# Patient Record
Sex: FEMALE | Race: BLACK OR AFRICAN AMERICAN | Employment: OTHER | URBAN - METROPOLITAN AREA
[De-identification: names, ages, dates, MRNs, and addresses within clinical notes are randomized per-mention and may not be internally consistent; named-entity substitution may affect disease eponyms.]

---

## 2019-11-26 ENCOUNTER — APPOINTMENT (OUTPATIENT)
Dept: CT IMAGING | Age: 34
End: 2019-11-26
Attending: EMERGENCY MEDICINE
Payer: MEDICAID

## 2019-11-26 ENCOUNTER — HOSPITAL ENCOUNTER (EMERGENCY)
Age: 34
Discharge: HOME | End: 2019-11-26
Attending: EMERGENCY MEDICINE
Payer: MEDICAID

## 2019-11-26 VITALS
RESPIRATION RATE: 18 BRPM | HEART RATE: 68 BPM | DIASTOLIC BLOOD PRESSURE: 80 MMHG | SYSTOLIC BLOOD PRESSURE: 135 MMHG | HEIGHT: 64 IN | WEIGHT: 293 LBS | BODY MASS INDEX: 50.02 KG/M2 | TEMPERATURE: 98.2 F | OXYGEN SATURATION: 95 %

## 2019-11-26 DIAGNOSIS — K38.9 APPENDICOLITH: ICD-10-CM

## 2019-11-26 DIAGNOSIS — R31.9 HEMATURIA, UNSPECIFIED TYPE: ICD-10-CM

## 2019-11-26 DIAGNOSIS — R10.31 ABDOMINAL PAIN, RIGHT LOWER QUADRANT: Primary | ICD-10-CM

## 2019-11-26 LAB
ALBUMIN SERPL-MCNC: 3.4 G/DL (ref 3.5–5)
ALBUMIN/GLOB SERPL: 0.7 {RATIO} (ref 1.1–2.2)
ALP SERPL-CCNC: 86 U/L (ref 45–117)
ALT SERPL-CCNC: 31 U/L (ref 12–78)
AMORPH CRY URNS QL MICRO: ABNORMAL
ANION GAP SERPL CALC-SCNC: 11 MMOL/L (ref 5–15)
APPEARANCE UR: ABNORMAL
AST SERPL-CCNC: 18 U/L (ref 15–37)
BACTERIA URNS QL MICRO: NEGATIVE /HPF
BASOPHILS # BLD: 0 K/UL (ref 0–0.1)
BASOPHILS NFR BLD: 1 % (ref 0–1)
BILIRUB SERPL-MCNC: 0.3 MG/DL (ref 0.2–1)
BILIRUB UR QL: NEGATIVE
BUN SERPL-MCNC: 8 MG/DL (ref 6–20)
BUN/CREAT SERPL: 11 (ref 12–20)
CALCIUM SERPL-MCNC: 8.5 MG/DL (ref 8.5–10.1)
CHLORIDE SERPL-SCNC: 102 MMOL/L (ref 97–108)
CO2 SERPL-SCNC: 26 MMOL/L (ref 21–32)
COLOR UR: ABNORMAL
COMMENT, HOLDF: NORMAL
CREAT SERPL-MCNC: 0.75 MG/DL (ref 0.55–1.02)
DIFFERENTIAL METHOD BLD: NORMAL
EOSINOPHIL # BLD: 0.1 K/UL (ref 0–0.4)
EOSINOPHIL NFR BLD: 1 % (ref 0–7)
EPITH CASTS URNS QL MICRO: ABNORMAL /LPF
ERYTHROCYTE [DISTWIDTH] IN BLOOD BY AUTOMATED COUNT: 13.9 % (ref 11.5–14.5)
GLOBULIN SER CALC-MCNC: 4.7 G/DL (ref 2–4)
GLUCOSE SERPL-MCNC: 94 MG/DL (ref 65–100)
GLUCOSE UR STRIP.AUTO-MCNC: NEGATIVE MG/DL
HCG UR QL: NEGATIVE
HCT VFR BLD AUTO: 37.1 % (ref 35–47)
HGB BLD-MCNC: 12.2 G/DL (ref 11.5–16)
HGB UR QL STRIP: ABNORMAL
IMM GRANULOCYTES # BLD AUTO: 0 K/UL (ref 0–0.04)
IMM GRANULOCYTES NFR BLD AUTO: 0 % (ref 0–0.5)
KETONES UR QL STRIP.AUTO: NEGATIVE MG/DL
LEUKOCYTE ESTERASE UR QL STRIP.AUTO: ABNORMAL
LIPASE SERPL-CCNC: 105 U/L (ref 73–393)
LYMPHOCYTES # BLD: 2.3 K/UL (ref 0.8–3.5)
LYMPHOCYTES NFR BLD: 37 % (ref 12–49)
MCH RBC QN AUTO: 29 PG (ref 26–34)
MCHC RBC AUTO-ENTMCNC: 32.9 G/DL (ref 30–36.5)
MCV RBC AUTO: 88.1 FL (ref 80–99)
MONOCYTES # BLD: 0.4 K/UL (ref 0–1)
MONOCYTES NFR BLD: 6 % (ref 5–13)
MUCOUS THREADS URNS QL MICRO: ABNORMAL /LPF
NEUTS SEG # BLD: 3.4 K/UL (ref 1.8–8)
NEUTS SEG NFR BLD: 55 % (ref 32–75)
NITRITE UR QL STRIP.AUTO: NEGATIVE
NRBC # BLD: 0 K/UL (ref 0–0.01)
NRBC BLD-RTO: 0 PER 100 WBC
PH UR STRIP: 6 [PH] (ref 5–8)
PLATELET # BLD AUTO: 192 K/UL (ref 150–400)
PMV BLD AUTO: 10.9 FL (ref 8.9–12.9)
POTASSIUM SERPL-SCNC: 3.8 MMOL/L (ref 3.5–5.1)
PROT SERPL-MCNC: 8.1 G/DL (ref 6.4–8.2)
PROT UR STRIP-MCNC: NEGATIVE MG/DL
RBC # BLD AUTO: 4.21 M/UL (ref 3.8–5.2)
RBC #/AREA URNS HPF: ABNORMAL /HPF (ref 0–5)
SAMPLES BEING HELD,HOLD: NORMAL
SODIUM SERPL-SCNC: 139 MMOL/L (ref 136–145)
SP GR UR REFRACTOMETRY: 1.02 (ref 1–1.03)
UR CULT HOLD, URHOLD: NORMAL
UROBILINOGEN UR QL STRIP.AUTO: 0.2 EU/DL (ref 0.2–1)
WBC # BLD AUTO: 6.2 K/UL (ref 3.6–11)
WBC URNS QL MICRO: ABNORMAL /HPF (ref 0–4)

## 2019-11-26 PROCEDURE — 81001 URINALYSIS AUTO W/SCOPE: CPT

## 2019-11-26 PROCEDURE — 85025 COMPLETE CBC W/AUTO DIFF WBC: CPT

## 2019-11-26 PROCEDURE — 99284 EMERGENCY DEPT VISIT MOD MDM: CPT

## 2019-11-26 PROCEDURE — 74177 CT ABD & PELVIS W/CONTRAST: CPT

## 2019-11-26 PROCEDURE — 74011000258 HC RX REV CODE- 258: Performed by: EMERGENCY MEDICINE

## 2019-11-26 PROCEDURE — 74011636320 HC RX REV CODE- 636/320: Performed by: EMERGENCY MEDICINE

## 2019-11-26 PROCEDURE — 80053 COMPREHEN METABOLIC PANEL: CPT

## 2019-11-26 PROCEDURE — 83690 ASSAY OF LIPASE: CPT

## 2019-11-26 PROCEDURE — 81025 URINE PREGNANCY TEST: CPT

## 2019-11-26 PROCEDURE — 74011250636 HC RX REV CODE- 250/636: Performed by: EMERGENCY MEDICINE

## 2019-11-26 PROCEDURE — 96374 THER/PROPH/DIAG INJ IV PUSH: CPT

## 2019-11-26 PROCEDURE — 36415 COLL VENOUS BLD VENIPUNCTURE: CPT

## 2019-11-26 RX ORDER — HYDROXYZINE 50 MG/1
50 TABLET, FILM COATED ORAL AS NEEDED
COMMUNITY
End: 2020-12-07

## 2019-11-26 RX ORDER — SODIUM CHLORIDE 0.9 % (FLUSH) 0.9 %
10 SYRINGE (ML) INJECTION ONCE
Status: COMPLETED | OUTPATIENT
Start: 2019-11-26 | End: 2019-11-26

## 2019-11-26 RX ORDER — KETOROLAC TROMETHAMINE 30 MG/ML
15 INJECTION, SOLUTION INTRAMUSCULAR; INTRAVENOUS
Status: COMPLETED | OUTPATIENT
Start: 2019-11-26 | End: 2019-11-26

## 2019-11-26 RX ORDER — SODIUM CHLORIDE 9 MG/ML
50 INJECTION, SOLUTION INTRAVENOUS ONCE
Status: COMPLETED | OUTPATIENT
Start: 2019-11-26 | End: 2019-11-26

## 2019-11-26 RX ORDER — SERTRALINE HYDROCHLORIDE 100 MG/1
200 TABLET, FILM COATED ORAL DAILY
COMMUNITY

## 2019-11-26 RX ADMIN — KETOROLAC TROMETHAMINE 15 MG: 30 INJECTION, SOLUTION INTRAMUSCULAR at 07:45

## 2019-11-26 RX ADMIN — Medication 10 ML: at 08:13

## 2019-11-26 RX ADMIN — SODIUM CHLORIDE 50 ML: 900 INJECTION, SOLUTION INTRAVENOUS at 08:13

## 2019-11-26 RX ADMIN — IOPAMIDOL 100 ML: 755 INJECTION, SOLUTION INTRAVENOUS at 08:13

## 2019-11-26 NOTE — ED TRIAGE NOTES
Pt arrives ambulatory to ed with complaints of intermittent RLQ pain since Saturday. Denies N/V/D or urinary symptoms.

## 2019-11-26 NOTE — ED PROVIDER NOTES
The history is provided by the patient. No  was used. Abdominal Pain    This is a new problem. The current episode started more than 2 days ago. The problem occurs hourly. The problem has not changed since onset. The pain is located in the RLQ. The quality of the pain is aching and dull. The pain is at a severity of 7/10. The pain is moderate. Pertinent negatives include no anorexia, no fever, no belching, no diarrhea, no flatus, no hematochezia, no melena, no nausea, no vomiting, no constipation, no dysuria, no frequency, no hematuria, no headaches, no arthralgias, no myalgias, no trauma, no chest pain and no back pain. The pain is worsened by activity and certain positions. The pain is relieved by nothing. Past workup includes no CT scan, no ultrasound, no surgery, no esophagogastroduodenoscopy, no UGI, no colonoscopy and no barium enema. Past Medical History:   Diagnosis Date    Psychiatric disorder     anxiety    Psychiatric disorder     depression       Past Surgical History:   Procedure Laterality Date    HX ORTHOPAEDIC           History reviewed. No pertinent family history.     Social History     Socioeconomic History    Marital status: Not on file     Spouse name: Not on file    Number of children: Not on file    Years of education: Not on file    Highest education level: Not on file   Occupational History    Not on file   Social Needs    Financial resource strain: Not on file    Food insecurity:     Worry: Not on file     Inability: Not on file    Transportation needs:     Medical: Not on file     Non-medical: Not on file   Tobacco Use    Smoking status: Never Smoker    Smokeless tobacco: Never Used   Substance and Sexual Activity    Alcohol use: Never     Frequency: Never    Drug use: Never    Sexual activity: Not on file   Lifestyle    Physical activity:     Days per week: Not on file     Minutes per session: Not on file    Stress: Not on file   Relationships  Social connections:     Talks on phone: Not on file     Gets together: Not on file     Attends Yazdanism service: Not on file     Active member of club or organization: Not on file     Attends meetings of clubs or organizations: Not on file     Relationship status: Not on file    Intimate partner violence:     Fear of current or ex partner: Not on file     Emotionally abused: Not on file     Physically abused: Not on file     Forced sexual activity: Not on file   Other Topics Concern    Not on file   Social History Narrative    Not on file         ALLERGIES: Patient has no known allergies. Review of Systems   Constitutional: Negative for activity change, chills and fever. HENT: Negative for nosebleeds, sore throat, trouble swallowing and voice change. Eyes: Negative for visual disturbance. Respiratory: Negative for shortness of breath. Cardiovascular: Negative for chest pain and palpitations. Gastrointestinal: Positive for abdominal pain. Negative for anorexia, constipation, diarrhea, flatus, hematochezia, melena, nausea and vomiting. Genitourinary: Negative for difficulty urinating, dysuria, frequency, hematuria and urgency. Musculoskeletal: Negative for arthralgias, back pain, myalgias, neck pain and neck stiffness. Skin: Negative for color change. Allergic/Immunologic: Negative for immunocompromised state. Neurological: Negative for dizziness, seizures, syncope, weakness, light-headedness, numbness and headaches. Psychiatric/Behavioral: Negative for behavioral problems, confusion, hallucinations, self-injury and suicidal ideas. Vitals:    11/26/19 0729   BP: (!) 150/92   Pulse: 92   Resp: 18   Temp: 98 °F (36.7 °C)   SpO2: 99%   Weight: 135.5 kg (298 lb 11.6 oz)   Height: 5' 4\" (1.626 m)            Physical Exam  Vitals signs and nursing note reviewed. Constitutional:       General: She is not in acute distress. Appearance: She is well-developed.  She is not diaphoretic. HENT:      Head: Normocephalic and atraumatic. Eyes:      Pupils: Pupils are equal, round, and reactive to light. Neck:      Musculoskeletal: Normal range of motion and neck supple. Cardiovascular:      Rate and Rhythm: Normal rate and regular rhythm. Heart sounds: Normal heart sounds. No murmur. No friction rub. No gallop. Pulmonary:      Effort: Pulmonary effort is normal. No respiratory distress. Breath sounds: Normal breath sounds. No wheezing. Abdominal:      General: Bowel sounds are normal. There is no distension. Palpations: Abdomen is soft. Tenderness: There is no tenderness. There is no guarding or rebound. Musculoskeletal: Normal range of motion. Skin:     General: Skin is warm. Findings: No rash. Neurological:      Mental Status: She is alert and oriented to person, place, and time. Psychiatric:         Behavior: Behavior normal.         Thought Content: Thought content normal.         Judgment: Judgment normal.          MDM     This is a 52-year-old female with past medical history, review of systems, physical exam as above, presenting with complaints of 3 days of right lower quadrant abdominal pain. Patient states pain is worse when walking, bending over, denies nausea, vomiting, diarrhea, states she took some Tums for presumed \"gas\", without relief. She denies a history of similar pain, endorses 2 times , last menses approximately 2 weeks ago, endorses previous bilateral tubal ligation. Physical exam remarkable for obese well-appearing young female, in no acute distress, with soft nontender abdomen, with no elicitable pain, clear breath sounds, regular rate and rhythm without murmurs gallops or rubs. Differential includes constipation, diverticular disease, appendicitis. Plan to administer pain control, obtain CMP, CBC, lipase, UA, UPT, CT scan of the abdomen and pelvis.   We will reassess, and make a disposition. Procedures    9:04 AM  Patient remains in no acute distress, states the pain is improved. CT scan with appendicolith without appendicitis, hematuria, without UTI. Unclear source of abdominal pain, however without dangerous pathology and improved symptoms. Will discharge home with conservative therapy, primary care follow-up, return precautions given.

## 2019-11-26 NOTE — DISCHARGE INSTRUCTIONS
Patient Education        Abdominal Pain: Care Instructions  Your Care Instructions    Abdominal pain has many possible causes. Some aren't serious and get better on their own in a few days. Others need more testing and treatment. If your pain continues or gets worse, you need to be rechecked and may need more tests to find out what is wrong. You may need surgery to correct the problem. Don't ignore new symptoms, such as fever, nausea and vomiting, urination problems, pain that gets worse, and dizziness. These may be signs of a more serious problem. Your doctor may have recommended a follow-up visit in the next 8 to 12 hours. If you are not getting better, you may need more tests or treatment. The doctor has checked you carefully, but problems can develop later. If you notice any problems or new symptoms, get medical treatment right away. Follow-up care is a key part of your treatment and safety. Be sure to make and go to all appointments, and call your doctor if you are having problems. It's also a good idea to know your test results and keep a list of the medicines you take. How can you care for yourself at home? · Rest until you feel better. · To prevent dehydration, drink plenty of fluids, enough so that your urine is light yellow or clear like water. Choose water and other caffeine-free clear liquids until you feel better. If you have kidney, heart, or liver disease and have to limit fluids, talk with your doctor before you increase the amount of fluids you drink. · If your stomach is upset, eat mild foods, such as rice, dry toast or crackers, bananas, and applesauce. Try eating several small meals instead of two or three large ones. · Wait until 48 hours after all symptoms have gone away before you have spicy foods, alcohol, and drinks that contain caffeine. · Do not eat foods that are high in fat. · Avoid anti-inflammatory medicines such as aspirin, ibuprofen (Advil, Motrin), and naproxen (Aleve). These can cause stomach upset. Talk to your doctor if you take daily aspirin for another health problem. When should you call for help? Call 911 anytime you think you may need emergency care. For example, call if:    · You passed out (lost consciousness).     · You pass maroon or very bloody stools.     · You vomit blood or what looks like coffee grounds.     · You have new, severe belly pain.    Call your doctor now or seek immediate medical care if:    · Your pain gets worse, especially if it becomes focused in one area of your belly.     · You have a new or higher fever.     · Your stools are black and look like tar, or they have streaks of blood.     · You have unexpected vaginal bleeding.     · You have symptoms of a urinary tract infection. These may include:  ? Pain when you urinate. ? Urinating more often than usual.  ? Blood in your urine.     · You are dizzy or lightheaded, or you feel like you may faint.    Watch closely for changes in your health, and be sure to contact your doctor if:    · You are not getting better after 1 day (24 hours). Where can you learn more? Go to http://harjinderTeracentwinston.info/. Enter K120 in the search box to learn more about \"Abdominal Pain: Care Instructions. \"  Current as of: June 26, 2019  Content Version: 12.2  © 6157-6909 [employer]. Care instructions adapted under license by [employer] (which disclaims liability or warranty for this information). If you have questions about a medical condition or this instruction, always ask your healthcare professional. [place] [address] any warranty or liability for your use of this information. Patient Education        Blood in the Urine: Care Instructions  Your Care Instructions    Blood in the urine, or hematuria, may make the urine look red, brown, or pink. There may be blood every time you urinate or just from time to time.  You cannot always see blood in the urine, but it will show up in a urine test.  Blood in the urine may be serious. It should always be checked by a doctor. Your doctor may recommend more tests, including an X-ray, a CT scan, or a cystoscopy (which lets a doctor look inside the urethra and bladder). Blood in the urine can be a sign of another problem. Common causes are bladder infections and kidney stones. An injury to your groin or your genital area can also cause bleeding in the urinary tract. Very hard exercise--such as running a marathon--can cause blood in the urine. Blood in the urine can also be a sign of kidney disease or cancer in the bladder or kidney. Many cases of blood in the urine are caused by a harmless condition that runs in families. This is called benign familial hematuria. It does not need any treatment. Sometimes your urine may look red or brown even though it does not contain blood. For example, not getting enough fluids (dehydration), taking certain medicines, or having a liver problem can change the color of your urine. Eating foods such as beets, rhubarb, or blackberries or foods with red food coloring can make your urine look red or pink. Follow-up care is a key part of your treatment and safety. Be sure to make and go to all appointments, and call your doctor if you are having problems. It's also a good idea to know your test results and keep a list of the medicines you take. When should you call for help? Call your doctor now or seek immediate medical care if:    · You have symptoms of a urinary infection. For example:  ? You have pus in your urine. ? You have pain in your back just below your rib cage. This is called flank pain. ? You have a fever, chills, or body aches. ? It hurts to urinate. ?  You have groin or belly pain.     · You have more blood in your urine.    Watch closely for changes in your health, and be sure to contact your doctor if:    · You have new urination problems.     · You do not get better as expected. Where can you learn more? Go to http://harjinder-winston.info/. Enter F667 in the search box to learn more about \"Blood in the Urine: Care Instructions. \"  Current as of: December 19, 2018  Content Version: 12.2  © 7899-9309 [employer]. Care instructions adapted under license by [employer] (which disclaims liability or warranty for this information). If you have questions about a medical condition or this instruction, always ask your healthcare professional. [address] any warranty or liability for your use of this information.

## 2020-07-31 ENCOUNTER — HOSPITAL ENCOUNTER (EMERGENCY)
Age: 35
Discharge: HOME | End: 2020-07-31
Attending: EMERGENCY MEDICINE
Payer: MEDICAID

## 2020-07-31 ENCOUNTER — APPOINTMENT (OUTPATIENT)
Dept: GENERAL RADIOLOGY | Age: 35
End: 2020-07-31
Attending: EMERGENCY MEDICINE
Payer: MEDICAID

## 2020-07-31 VITALS
HEART RATE: 92 BPM | HEIGHT: 64 IN | RESPIRATION RATE: 16 BRPM | OXYGEN SATURATION: 95 % | DIASTOLIC BLOOD PRESSURE: 93 MMHG | WEIGHT: 293 LBS | SYSTOLIC BLOOD PRESSURE: 140 MMHG | TEMPERATURE: 98.5 F | BODY MASS INDEX: 50.02 KG/M2

## 2020-07-31 DIAGNOSIS — R07.89 OTHER CHEST PAIN: Primary | ICD-10-CM

## 2020-07-31 DIAGNOSIS — F41.1 ANXIETY STATE: ICD-10-CM

## 2020-07-31 LAB
ALBUMIN SERPL-MCNC: 3.6 G/DL (ref 3.5–5)
ALBUMIN/GLOB SERPL: 0.8 {RATIO} (ref 1.1–2.2)
ALP SERPL-CCNC: 74 U/L (ref 45–117)
ALT SERPL-CCNC: 28 U/L (ref 12–78)
ANION GAP SERPL CALC-SCNC: 10 MMOL/L (ref 5–15)
AST SERPL-CCNC: 19 U/L (ref 15–37)
ATRIAL RATE: 88 BPM
BASOPHILS # BLD: 0 K/UL (ref 0–0.1)
BASOPHILS NFR BLD: 1 % (ref 0–1)
BILIRUB SERPL-MCNC: 0.3 MG/DL (ref 0.2–1)
BUN SERPL-MCNC: 12 MG/DL (ref 6–20)
BUN/CREAT SERPL: 13 (ref 12–20)
CALCIUM SERPL-MCNC: 8.9 MG/DL (ref 8.5–10.1)
CALCULATED P AXIS, ECG09: 39 DEGREES
CALCULATED R AXIS, ECG10: 24 DEGREES
CALCULATED T AXIS, ECG11: 26 DEGREES
CHLORIDE SERPL-SCNC: 102 MMOL/L (ref 97–108)
CO2 SERPL-SCNC: 27 MMOL/L (ref 21–32)
COMMENT, HOLDF: NORMAL
CREAT SERPL-MCNC: 0.9 MG/DL (ref 0.55–1.02)
DIAGNOSIS, 93000: NORMAL
DIFFERENTIAL METHOD BLD: NORMAL
EOSINOPHIL # BLD: 0.1 K/UL (ref 0–0.4)
EOSINOPHIL NFR BLD: 1 % (ref 0–7)
ERYTHROCYTE [DISTWIDTH] IN BLOOD BY AUTOMATED COUNT: 13.7 % (ref 11.5–14.5)
GLOBULIN SER CALC-MCNC: 4.8 G/DL (ref 2–4)
GLUCOSE SERPL-MCNC: 92 MG/DL (ref 65–100)
HCG SERPL QL: NEGATIVE
HCG UR QL: NEGATIVE
HCT VFR BLD AUTO: 38.2 % (ref 35–47)
HGB BLD-MCNC: 12.5 G/DL (ref 11.5–16)
IMM GRANULOCYTES # BLD AUTO: 0 K/UL (ref 0–0.04)
IMM GRANULOCYTES NFR BLD AUTO: 0 % (ref 0–0.5)
LYMPHOCYTES # BLD: 2.8 K/UL (ref 0.8–3.5)
LYMPHOCYTES NFR BLD: 35 % (ref 12–49)
MCH RBC QN AUTO: 29.1 PG (ref 26–34)
MCHC RBC AUTO-ENTMCNC: 32.7 G/DL (ref 30–36.5)
MCV RBC AUTO: 88.8 FL (ref 80–99)
MONOCYTES # BLD: 0.5 K/UL (ref 0–1)
MONOCYTES NFR BLD: 6 % (ref 5–13)
NEUTS SEG # BLD: 4.5 K/UL (ref 1.8–8)
NEUTS SEG NFR BLD: 57 % (ref 32–75)
NRBC # BLD: 0 K/UL (ref 0–0.01)
NRBC BLD-RTO: 0 PER 100 WBC
P-R INTERVAL, ECG05: 164 MS
PLATELET # BLD AUTO: 214 K/UL (ref 150–400)
PMV BLD AUTO: 10.7 FL (ref 8.9–12.9)
POTASSIUM SERPL-SCNC: 3.5 MMOL/L (ref 3.5–5.1)
PROT SERPL-MCNC: 8.4 G/DL (ref 6.4–8.2)
Q-T INTERVAL, ECG07: 374 MS
QRS DURATION, ECG06: 84 MS
QTC CALCULATION (BEZET), ECG08: 452 MS
RBC # BLD AUTO: 4.3 M/UL (ref 3.8–5.2)
SAMPLES BEING HELD,HOLD: NORMAL
SODIUM SERPL-SCNC: 139 MMOL/L (ref 136–145)
TROPONIN I SERPL-MCNC: <0.05 NG/ML
VENTRICULAR RATE, ECG03: 88 BPM
WBC # BLD AUTO: 7.9 K/UL (ref 3.6–11)

## 2020-07-31 PROCEDURE — 71046 X-RAY EXAM CHEST 2 VIEWS: CPT

## 2020-07-31 PROCEDURE — 36415 COLL VENOUS BLD VENIPUNCTURE: CPT

## 2020-07-31 PROCEDURE — 81025 URINE PREGNANCY TEST: CPT

## 2020-07-31 PROCEDURE — 84703 CHORIONIC GONADOTROPIN ASSAY: CPT

## 2020-07-31 PROCEDURE — 85025 COMPLETE CBC W/AUTO DIFF WBC: CPT

## 2020-07-31 PROCEDURE — 84484 ASSAY OF TROPONIN QUANT: CPT

## 2020-07-31 PROCEDURE — 93005 ELECTROCARDIOGRAM TRACING: CPT

## 2020-07-31 PROCEDURE — 80053 COMPREHEN METABOLIC PANEL: CPT

## 2020-07-31 PROCEDURE — 99284 EMERGENCY DEPT VISIT MOD MDM: CPT

## 2020-07-31 RX ORDER — BUPIVACAINE HYDROCHLORIDE 5 MG/ML
3 INJECTION, SOLUTION EPIDURAL; INTRACAUDAL ONCE
Status: DISCONTINUED | OUTPATIENT
Start: 2020-07-31 | End: 2020-07-31

## 2020-07-31 NOTE — ED TRIAGE NOTES
Pt arrives ambulatory to ed with complaints of chest tightness and jitteriness s/p panic attack approx. 45 mins.

## 2020-07-31 NOTE — DISCHARGE INSTRUCTIONS
Patient Education     Patient Education        Anxiety Disorder: Care Instructions  Your Care Instructions     Anxiety is a normal reaction to stress. Difficult situations can cause you to have symptoms such as sweaty palms and a nervous feeling. In an anxiety disorder, the symptoms are far more severe. Constant worry, muscle tension, trouble sleeping, nausea and diarrhea, and other symptoms can make normal daily activities difficult or impossible. These symptoms may occur for no reason, and they can affect your work, school, or social life. Medicines, counseling, and self-care can all help. Follow-up care is a key part of your treatment and safety. Be sure to make and go to all appointments, and call your doctor if you are having problems. It's also a good idea to know your test results and keep a list of the medicines you take. How can you care for yourself at home? · Take medicines exactly as directed. Call your doctor if you think you are having a problem with your medicine. · Go to your counseling sessions and follow-up appointments. · Recognize and accept your anxiety. Then, when you are in a situation that makes you anxious, say to yourself, \"This is not an emergency. I feel uncomfortable, but I am not in danger. I can keep going even if I feel anxious. \"  · Be kind to your body:  ? Relieve tension with exercise or a massage. ? Get enough rest.  ? Avoid alcohol, caffeine, nicotine, and illegal drugs. They can increase your anxiety level and cause sleep problems. ? Learn and do relaxation techniques. See below for more about these techniques. · Engage your mind. Get out and do something you enjoy. Go to a funny movie, or take a walk or hike. Plan your day. Having too much or too little to do can make you anxious. · Keep a record of your symptoms. Discuss your fears with a good friend or family member, or join a support group for people with similar problems.  Talking to others sometimes relieves stress. · Get involved in social groups, or volunteer to help others. Being alone sometimes makes things seem worse than they are. · Get at least 30 minutes of exercise on most days of the week to relieve stress. Walking is a good choice. You also may want to do other activities, such as running, swimming, cycling, or playing tennis or team sports. Relaxation techniques  Do relaxation exercises 10 to 20 minutes a day. You can play soothing, relaxing music while you do them, if you wish. · Tell others in your house that you are going to do your relaxation exercises. Ask them not to disturb you. · Find a comfortable place, away from all distractions and noise. · Lie down on your back, or sit with your back straight. · Focus on your breathing. Make it slow and steady. · Breathe in through your nose. Breathe out through either your nose or mouth. · Breathe deeply, filling up the area between your navel and your rib cage. Breathe so that your belly goes up and down. · Do not hold your breath. · Breathe like this for 5 to 10 minutes. Notice the feeling of calmness throughout your whole body. As you continue to breathe slowly and deeply, relax by doing the following for another 5 to 10 minutes:  · Tighten and relax each muscle group in your body. You can begin at your toes and work your way up to your head. · Imagine your muscle groups relaxing and becoming heavy. · Empty your mind of all thoughts. · Let yourself relax more and more deeply. · Become aware of the state of calmness that surrounds you. · When your relaxation time is over, you can bring yourself back to alertness by moving your fingers and toes and then your hands and feet and then stretching and moving your entire body. Sometimes people fall asleep during relaxation, but they usually wake up shortly afterward.   · Always give yourself time to return to full alertness before you drive a car or do anything that might cause an accident if you are not fully alert. Never play a relaxation tape while you drive a car. When should you call for help? [id number] anytime you think you may need emergency care. For example, call if:  · You feel you cannot stop from hurting yourself or someone else. Keep the numbers for these national suicide hotlines: 3-574-842-TALK (5-584.245.4372) and 1-930-KXOOGZO (4-423.787.4121). If you or someone you know talks about suicide or feeling hopeless, get help right away. Watch closely for changes in your health, and be sure to contact your doctor if:  · You have anxiety or fear that affects your life. · You have symptoms of anxiety that are new or different from those you had before. Where can you learn more? Go to http://harjinderZero Motorcycleswinston.info/  Enter P754 in the search box to learn more about \"Anxiety Disorder: Care Instructions. \"  Current as of: January 31, 2020               Content Version: 12.5  © 0823-9551 [employer]. Care instructions adapted under license by [employer] (which disclaims liability or warranty for this information). If you have questions about a medical condition or this instruction, always ask your healthcare professional. [address] any warranty or liability for your use of this information. Chest Pain: Care Instructions  Your Care Instructions     There are many things that can cause chest pain. Some are not serious and will get better on their own in a few days. But some kinds of chest pain need more testing and treatment. Your doctor may have recommended a follow-up visit in the next 8 to 12 hours. If you are not getting better, you may need more tests or treatment. Even though your doctor has released you, you still need to watch for any problems. The doctor carefully checked you, but sometimes problems can develop later. If you have new symptoms or if your symptoms do not get better, get medical care right away.   If you have worse or different chest pain or pressure that lasts more than 5 minutes or you passed out (lost consciousness), [id number] or seek other emergency help right away. A medical visit is only one step in your treatment. Even if you feel better, you still need to do what your doctor recommends, such as going to all suggested follow-up appointments and taking medicines exactly as directed. This will help you recover and help prevent future problems. How can you care for yourself at home? · Rest until you feel better. · Take your medicine exactly as prescribed. Call your doctor if you think you are having a problem with your medicine. · Do not drive after taking a prescription pain medicine. When should you call for help? [id number]:   · You passed out (lost consciousness). · You have severe difficulty breathing. · You have symptoms of a heart attack. These may include:  ? Chest pain or pressure, or a strange feeling in your chest.  ? Sweating. ? Shortness of breath. ? Nausea or vomiting. ? Pain, pressure, or a strange feeling in your back, neck, jaw, or upper belly or in one or both shoulders or arms. ? Lightheadedness or sudden weakness. ? A fast or irregular heartbeat. After you call 911, the  may tell you to chew 1 adult-strength or 2 to 4 low-dose aspirin. Wait for an ambulance. Do not try to drive yourself. Call your doctor today if:   · You have any trouble breathing. · Your chest pain gets worse. · You are dizzy or lightheaded, or you feel like you may faint. · You are not getting better as expected. · You are having new or different chest pain. Where can you learn more? Go to http://www.jacome.com/  Enter A120 in the search box to learn more about \"Chest Pain: Care Instructions. \"  Current as of: June 26, 2019               Content Version: 12.5  © 0059-5107 [employer]wise[employer].    Care instructions adapted under license by [employer] (which disclaims liability or warranty for this information). If you have questions about a medical condition or this instruction, always ask your healthcare professional. [address] any warranty or liability for your use of this information.

## 2020-07-31 NOTE — ED PROVIDER NOTES
Please note that this dictation was completed with [email or web address], the computer voice recognition software.  Quite often unanticipated grammatical, syntax, homophones, and other interpretive errors are inadvertently transcribed by the computer software.  Please disregard these errors.  Please excuse any errors that have escaped final proofreading. 76-year-old female past medical history markable for anxiety, depression, and morbid obesity presents the ER complaining of \"been having chest pain today since 11 AM.  Patient states is a dull ache intermittent acute onset at rest has continued through the day. Patient states that approximately 1 hour ago she began to have a panic attack. Patient states she felt like she was having difficulty catching her breath and generalized anxiety. She said there was no inciting episodes who caused this to start, \"I was driving in the car with my  and I just started. \"  Patient states she took some Atarax approximately 45 minutes ago and feels like that is starting to help now. She denies current chest pain shortness of breath or other current systemic complaints. She denies recent illnesses nausea vomiting diarrhea vaginal discharge burning with urination vaginal bleeding. pt denies HA, vison changes, diff swallowing,  Abd pain, F/Ch, N/V, D/Cons or other current systemic complaints    Social/ PSH reviewed in EMR    EMR Chart Reviewed           Past Medical History:   Diagnosis Date    Psychiatric disorder     anxiety    Psychiatric disorder     depression       Past Surgical History:   Procedure Laterality Date    HX ORTHOPAEDIC           History reviewed. No pertinent family history.     Social History     Socioeconomic History    Marital status:      Spouse name: Not on file    Number of children: Not on file    Years of education: Not on file    Highest education level: Not on file   Occupational History    Not on file   Social Needs    Financial resource strain: Not on file    Food insecurity     Worry: Not on file     Inability: Not on file    Transportation needs     Medical: Not on file     Non-medical: Not on file   Tobacco Use    Smoking status: Never Smoker    Smokeless tobacco: Never Used   Substance and Sexual Activity    Alcohol use: Never     Frequency: Never    Drug use: Never    Sexual activity: Not on file   Lifestyle    Physical activity     Days per week: Not on file     Minutes per session: Not on file    Stress: Not on file   Relationships    Social connections     Talks on phone: Not on file     Gets together: Not on file     Attends Cheondoism service: Not on file     Active member of club or organization: Not on file     Attends meetings of clubs or organizations: Not on file     Relationship status: Not on file    Intimate partner violence     Fear of current or ex partner: Not on file     Emotionally abused: Not on file     Physically abused: Not on file     Forced sexual activity: Not on file   Other Topics Concern    Not on file   Social History Narrative    Not on file         ALLERGIES: Patient has no known allergies. Review of Systems   Constitutional: Negative for chills and fever. HENT: Negative for trouble swallowing and voice change. Respiratory: Positive for chest tightness and shortness of breath. Cardiovascular: Positive for chest pain. Psychiatric/Behavioral: The patient is nervous/anxious. All other systems reviewed and are negative. Vitals:    07/31/20 1724 07/31/20 1815   BP: (!) 194/95 (!) 140/93   Pulse: 92    Resp: 16    Temp: 98.5 °F (36.9 °C)    SpO2: 100% 95%   Weight: 140.5 kg (309 lb 11.9 oz)    Height: 5' 4\" (1.626 m)             Physical Exam  Vitals signs and nursing note reviewed. Constitutional:       General: She is not in acute distress. Appearance: Normal appearance. She is well-developed. She is not ill-appearing, toxic-appearing or diaphoretic.       Comments: NAD, AxOx4, speaking in complete sentences     HENT:      Head: Normocephalic and atraumatic. Comments: Cn intact    Noted R mandibular swelling/ consistent w/ abscess;      Right Ear: External ear normal.      Left Ear: External ear normal.   Eyes:      General: No scleral icterus. Right eye: No discharge. Extraocular Movements: Extraocular movements intact. Conjunctiva/sclera: Conjunctivae normal.      Pupils: Pupils are equal, round, and reactive to light. Neck:      Musculoskeletal: Normal range of motion and neck supple. Vascular: No JVD. Trachea: No tracheal deviation. Cardiovascular:      Rate and Rhythm: Normal rate and regular rhythm. Pulses: Normal pulses. Heart sounds: Normal heart sounds. No murmur. No friction rub. No gallop. Pulmonary:      Effort: Pulmonary effort is normal. No respiratory distress. Breath sounds: Normal breath sounds. No wheezing or rales. Chest:      Chest wall: No tenderness. Abdominal:      General: Bowel sounds are normal.      Palpations: Abdomen is soft. Tenderness: There is no abdominal tenderness. There is no guarding or rebound. Genitourinary:     Vagina: No vaginal discharge. Comments: Pt denies urinary/ vaginal complaints  Musculoskeletal: Normal range of motion. General: No swelling, tenderness, deformity or signs of injury. Right lower leg: No edema. Left lower leg: No edema. Skin:     General: Skin is warm and dry. Capillary Refill: Capillary refill takes less than 2 seconds. Coloration: Skin is not jaundiced or pale. Findings: No bruising, erythema, lesion or rash. Neurological:      General: No focal deficit present. Mental Status: She is alert and oriented to person, place, and time. Cranial Nerves: No cranial nerve deficit. Motor: No abnormal muscle tone.       Coordination: Coordination normal.      Comments: pt has motor/ CV/ Sensation grossly intact to all extremities, R = L in strength;         Psychiatric:         Behavior: Behavior normal.         Thought Content: Thought content normal.          MDM       Procedures      Chief Complaint   Patient presents with    Panic Attack       5:44 PM  The patients presenting problems have been discussed, and they are in agreement with the care plan formulated and outlined with them. I have encouraged them to ask questions as they arise throughout their visit. MEDICATIONS GIVEN:  Medications - No data to display    LABS REVIEWED:  Labs Reviewed   SAMPLES BEING HELD   CBC WITH AUTOMATED DIFF   METABOLIC PANEL, COMPREHENSIVE   TROPONIN I   HCG URINE, QL. - POC       RADIOLOGY RESULTS:  The following have been ordered and reviewed:  _____________________________________________________________________  _____________________________________________________________________    EKG interpretation:   Rhythm: normal sinus rhythm; and regular . Rate (approx.): 88; Axis: normal; P wave: normal; QRS interval: normal ; ST/T wave: normal; Negative acute significant segmental elevations/ no old;     PROCEDURES:        CONSULTATIONS:       PROGRESS NOTES:      DIAGNOSIS:    1. Other chest pain    2. Anxiety state        PLAN:  1-Chest Pain / neg ed evaluation - will have pt follow-up with Cardiology;   2 Panic attack - resolved w/ atarax;       ED COURSE: The patients hospital course has been uncomplicated. Kayley Plain  results have been reviewed with her. She has been counseled regarding her diagnosis. She verbally conveys understanding and agreement of the signs, symptoms, diagnosis, treatment and prognosis and additionally agrees to Call/ Arrange follow up as recommended with Dr. Pierre Hickman MD in 24 - 48 hours. She also agrees with the care-plan and conveys that all of her questions have been answered.   I have also put together some discharge instructions for her that include: 1) educational information regarding their diagnosis, 2) how to care for their diagnosis at home, as well a 3) list of reasons why they would want to return to the ED prior to their follow-up appointment, should their condition change or for concerns.

## 2020-08-03 ENCOUNTER — TELEPHONE (OUTPATIENT)
Dept: CARDIOLOGY CLINIC | Age: 35
End: 2020-08-03

## 2020-08-03 NOTE — TELEPHONE ENCOUNTER
Left a voice message on 8/3/2020 requesting a return call to coordinate an appointment with ( any available cardiologist) due to ER referral for chest pain & SOB(No arrhythmia noted).

## 2020-08-06 ENCOUNTER — OFFICE VISIT (OUTPATIENT)
Dept: CARDIOLOGY CLINIC | Age: 35
End: 2020-08-06
Payer: MEDICAID

## 2020-08-06 VITALS
SYSTOLIC BLOOD PRESSURE: 120 MMHG | RESPIRATION RATE: 18 BRPM | BODY MASS INDEX: 50.02 KG/M2 | HEART RATE: 82 BPM | OXYGEN SATURATION: 98 % | DIASTOLIC BLOOD PRESSURE: 80 MMHG | WEIGHT: 293 LBS | HEIGHT: 64 IN

## 2020-08-06 DIAGNOSIS — R00.2 PALPITATIONS: ICD-10-CM

## 2020-08-06 DIAGNOSIS — R06.02 SOB (SHORTNESS OF BREATH): Primary | ICD-10-CM

## 2020-08-06 DIAGNOSIS — F43.12 CHRONIC POST-TRAUMATIC STRESS DISORDER (PTSD): ICD-10-CM

## 2020-08-06 DIAGNOSIS — F41.0 PANIC ATTACKS: ICD-10-CM

## 2020-08-06 DIAGNOSIS — E66.01 OBESITY, MORBID (HCC): ICD-10-CM

## 2020-08-06 DIAGNOSIS — R06.02 SOB (SHORTNESS OF BREATH): ICD-10-CM

## 2020-08-06 PROCEDURE — 99204 OFFICE O/P NEW MOD 45 MIN: CPT | Performed by: SPECIALIST

## 2020-08-06 PROCEDURE — 1111F DSCHRG MED/CURRENT MED MERGE: CPT | Performed by: SPECIALIST

## 2020-08-06 NOTE — PROGRESS NOTES
HISTORY OF PRESENT ILLNESS  Megan Fenton is a 36 y.o. female. She is referred from [facility] [place] [address] emergency room June of shortness of breath and palpitations. She went there about a week ago with what she felt was a panic attack. Her EKG showed normal sinus rhythm. She does have a history of anxiety and panic attacks but is also had occasional bouts of rapid palpitations and shortness of breath. She does not smoke or drink she used to work as a  for 7 years and became so traumatized that she is now on long-term disability from this. She has no significant family history of heart disease. I reviewed her labs from the emergency room and they are unremarkable as was her chest x-ray. HPI  Patient Active Problem List   Diagnosis Code    Obesity, morbid ([facility] [address] E66.01    Panic attacks F41.0    Rapid palpitations R00.2    Chronic post-traumatic stress disorder (PTSD) F43.12    SOB (shortness of breath) R06.02     Current Outpatient Medications   Medication Sig Dispense Refill    levonorgestreL (MIRENA) 20 mcg/24 hours (5 yrs) 52 mg IUD 1 Intra Uterine Device by IntraUTERine route once.  sertraline (ZOLOFT) 100 mg tablet Take 200 mg by mouth daily.  hydrOXYzine HCl (ATARAX) 50 mg tablet Take 50 mg by mouth as needed for Anxiety. Past Medical History:   Diagnosis Date    Psychiatric disorder     anxiety    Psychiatric disorder     depression     Past Surgical History:   Procedure Laterality Date    HX ORTHOPAEDIC         Review of Systems   Respiratory: Positive for shortness of breath. Cardiovascular: Positive for palpitations. Psychiatric/Behavioral: The patient is nervous/anxious. All other systems reviewed and are negative.     Visit Vitals  /80 (BP 1 Location: Left arm, BP Patient Position: Sitting)   Pulse 82   Resp 18   Ht 5' 4\" (1.626 m)   Wt 312 lb (141.5 kg)   SpO2 98%   BMI 53.55 kg/m²       Physical Exam   Constitutional: She is oriented to person, place, and time. She appears well-nourished. HENT:   Head: Atraumatic. Eyes: Conjunctivae are normal.   Neck: Neck supple. Cardiovascular: Normal rate, regular rhythm and normal heart sounds. Exam reveals no gallop and no friction rub. No murmur heard. Pulmonary/Chest: Breath sounds normal. She has no wheezes. Abdominal: Bowel sounds are normal. There is no abdominal tenderness. Musculoskeletal:         General: No edema. Neurological: She is oriented to person, place, and time. Skin: Skin is dry. Psychiatric: Her behavior is normal.   Nursing note and vitals reviewed. ASSESSMENT and PLAN  Her exam is unremarkable and her EKG done recently was normal as well. I suspect that she is having panic attacks but she could also be having supra ventricular tachycardia and then the sense of anxiety. I believe it would be hard to catch any arrhythmia at present unless it becomes more frequent. However I will have her return for an echocardiogram and we will call her regarding the results. If her echo is unremarkable and I will see her back as needed.

## 2020-08-13 ENCOUNTER — ANCILLARY PROCEDURE (OUTPATIENT)
Dept: CARDIOLOGY CLINIC | Age: 35
End: 2020-08-13
Payer: MEDICAID

## 2020-08-13 VITALS — BODY MASS INDEX: 50.02 KG/M2 | HEIGHT: 64 IN | WEIGHT: 293 LBS

## 2020-08-13 PROCEDURE — 93306 TTE W/DOPPLER COMPLETE: CPT | Performed by: SPECIALIST

## 2020-08-21 ENCOUNTER — TELEPHONE (OUTPATIENT)
Dept: FAMILY MEDICINE CLINIC | Age: 35
End: 2020-08-21

## 2020-08-21 NOTE — TELEPHONE ENCOUNTER
----- Message from Ada Berry sent at 8/19/2020 12:15 PM EDT -----  Regarding: MIKE Joyce/ telephone   Appointment not available      Patient's first and last name: Hans Jenkins  ID numbers: [id number]    Caller's first and last name and relationship to patient (if not the patient): pt  Best contact number: [phone]  Preferred date and time: Any day Anytime  Scheduled appointment date and time:  n/a  Reason for appointment: New Patient  Details to clarify the request: Pt would like to establish care for PCP. Pt would like a call back to schedule as soon as possible. Pt is having issues with High blood pressure.

## 2020-08-24 ENCOUNTER — TELEPHONE (OUTPATIENT)
Dept: CARDIOLOGY CLINIC | Age: 35
End: 2020-08-24

## 2020-08-24 LAB
ECHO AO ROOT DIAM: 3.11 CM
ECHO AV AREA PEAK VELOCITY: 2.12 CM2
ECHO AV AREA VTI: 2.19 CM2
ECHO AV AREA/BSA PEAK VELOCITY: 0.9 CM2/M2
ECHO AV AREA/BSA VTI: 0.9 CM2/M2
ECHO AV MEAN GRADIENT: 5.13 MMHG
ECHO AV PEAK GRADIENT: 9.8 MMHG
ECHO AV PEAK VELOCITY: 156.54 CM/S
ECHO AV VTI: 26.27 CM
ECHO IVC PROX: 1.64 CM
ECHO LA AREA 4C: 19.41 CM2
ECHO LA MAJOR AXIS: 3.78 CM
ECHO LA MINOR AXIS: 1.6 CM
ECHO LA VOL 2C: 65.66 ML (ref 22–52)
ECHO LA VOL 4C: 52.55 ML (ref 22–52)
ECHO LA VOL BP: 63.09 ML (ref 22–52)
ECHO LA VOL/BSA BIPLANE: 26.7 ML/M2 (ref 16–28)
ECHO LA VOLUME INDEX A2C: 27.79 ML/M2 (ref 16–28)
ECHO LA VOLUME INDEX A4C: 22.24 ML/M2 (ref 16–28)
ECHO LV E' LATERAL VELOCITY: 8.95 CM/S
ECHO LV E' SEPTAL VELOCITY: 11.3 CM/S
ECHO LV EDV A2C: 99.8 ML
ECHO LV EDV A4C: 100.99 ML
ECHO LV EDV BP: 104.9 ML (ref 56–104)
ECHO LV EDV INDEX A4C: 42.7 ML/M2
ECHO LV EDV INDEX BP: 44.4 ML/M2
ECHO LV EDV NDEX A2C: 42.2 ML/M2
ECHO LV EJECTION FRACTION A2C: 56 PERCENT
ECHO LV EJECTION FRACTION A4C: 50 PERCENT
ECHO LV EJECTION FRACTION BIPLANE: 54.4 PERCENT (ref 55–100)
ECHO LV ESV A2C: 44.15 ML
ECHO LV ESV A4C: 50.27 ML
ECHO LV ESV BP: 47.86 ML (ref 19–49)
ECHO LV ESV INDEX A2C: 18.7 ML/M2
ECHO LV ESV INDEX A4C: 21.3 ML/M2
ECHO LV ESV INDEX BP: 20.3 ML/M2
ECHO LV INTERNAL DIMENSION DIASTOLIC: 5.46 CM (ref 3.9–5.3)
ECHO LV INTERNAL DIMENSION SYSTOLIC: 3.66 CM
ECHO LV IVSD: 1.14 CM (ref 0.6–0.9)
ECHO LV MASS 2D: 254 G (ref 67–162)
ECHO LV MASS INDEX 2D: 107.5 G/M2 (ref 43–95)
ECHO LV POSTERIOR WALL DIASTOLIC: 1.16 CM (ref 0.6–0.9)
ECHO LVOT DIAM: 2.15 CM
ECHO LVOT PEAK GRADIENT: 3.32 MMHG
ECHO LVOT PEAK VELOCITY: 91.13 CM/S
ECHO LVOT SV: 57.5 ML
ECHO LVOT VTI: 15.78 CM
ECHO MV A VELOCITY: 94.35 CM/S
ECHO MV E DECELERATION TIME (DT): 0.25 S
ECHO MV E VELOCITY: 81.77 CM/S
ECHO MV E/A RATIO: 0.87
ECHO MV E/E' LATERAL: 9.14
ECHO MV E/E' RATIO (AVERAGED): 8.19
ECHO MV E/E' SEPTAL: 7.24
ECHO PV MAX VELOCITY: 123.94 CM/S
ECHO PV PEAK INSTANTANEOUS GRADIENT SYSTOLIC: 6.14 MMHG
ECHO RV TAPSE: 2.67 CM (ref 1.5–2)
LA VOL DISK BP: 60.17 ML (ref 22–52)

## 2020-08-24 NOTE — TELEPHONE ENCOUNTER
----- Message from Tangela Self MD sent at 8/24/2020  8:32 AM EDT -----  Echo unremarkable, will see back as needed

## 2020-08-24 NOTE — TELEPHONE ENCOUNTER
Patient has been scheduled with Juanita Gary NP for new patient appointment 9/2020.   Letty Alvarado LPN

## 2020-09-09 ENCOUNTER — OFFICE VISIT (OUTPATIENT)
Dept: FAMILY MEDICINE CLINIC | Age: 35
End: 2020-09-09
Payer: MEDICAID

## 2020-09-09 VITALS
RESPIRATION RATE: 16 BRPM | OXYGEN SATURATION: 98 % | HEART RATE: 77 BPM | HEIGHT: 64 IN | DIASTOLIC BLOOD PRESSURE: 86 MMHG | TEMPERATURE: 98.5 F | WEIGHT: 293 LBS | BODY MASS INDEX: 50.02 KG/M2 | SYSTOLIC BLOOD PRESSURE: 140 MMHG

## 2020-09-09 DIAGNOSIS — I10 HYPERTENSION, UNSPECIFIED TYPE: Primary | ICD-10-CM

## 2020-09-09 DIAGNOSIS — F32.A ANXIETY AND DEPRESSION: ICD-10-CM

## 2020-09-09 DIAGNOSIS — E66.01 OBESITY, MORBID (HCC): ICD-10-CM

## 2020-09-09 DIAGNOSIS — F41.9 ANXIETY AND DEPRESSION: ICD-10-CM

## 2020-09-09 PROCEDURE — 99203 OFFICE O/P NEW LOW 30 MIN: CPT | Performed by: NURSE PRACTITIONER

## 2020-09-09 RX ORDER — PROPRANOLOL HYDROCHLORIDE 20 MG/1
TABLET ORAL
COMMUNITY
Start: 2020-08-25 | End: 2020-12-07

## 2020-09-09 RX ORDER — SERTRALINE HYDROCHLORIDE 100 MG/1
TABLET, FILM COATED ORAL
COMMUNITY
End: 2020-12-07

## 2020-09-09 NOTE — PROGRESS NOTES
[facility] Note  Subjective:      Kennedi Gold is a 30 y.o. female who presents as a new patient, she has histroy of hypertension, morbid obesity anxiety and depression. During her office visit with psychiatrist her blood pressure was elevated and thus her psychiatrist started he adán inderal 10 mg daily. Her current BP is 140/85. She is morbidly obese, will refer her for diet counseling with maryam Wyatt     Past Medical History:   Diagnosis Date    Psychiatric disorder     anxiety    Psychiatric disorder     depression   '  Past Surgical History:   Procedure Laterality Date    HX ORTHOPAEDIC      right knee        Current Outpatient Medications   Medication Sig Dispense Refill    propranoloL (INDERAL) 20 mg tablet TK 1 T PO BID FOR 14 DAYS      sertraline (Zoloft) 100 mg tablet 2 capsules      levonorgestreL (MIRENA) 20 mcg/24 hours (5 yrs) 52 mg IUD 1 Intra Uterine Device by IntraUTERine route once.  sertraline (ZOLOFT) 100 mg tablet Take 200 mg by mouth daily.  hydrOXYzine HCl (ATARAX) 50 mg tablet Take 50 mg by mouth as needed for Anxiety. No Known Allergies    ROS:   Complete review of systems was reviewed with pertinent information listed in HPI. Review of Systems   Constitutional: Negative. Respiratory: Negative. Cardiovascular: Negative. Gastrointestinal: Negative. Psychiatric/Behavioral: Positive for depression. Negative for hallucinations, memory loss, substance abuse and suicidal ideas. The patient has insomnia. The patient is not nervous/anxious. Objective:     Visit Vitals  /86 (BP 1 Location: Left arm, BP Patient Position: Sitting)   Pulse 77   Temp 98.5 °F (36.9 °C) (Oral)   Resp 16   Ht 5' 4\" (1.626 m)   Wt 315 lb (142.9 kg)   LMP 08/10/2020   SpO2 98%   BMI 54.07 kg/m²       Vitals and Nurse Documentation reviewed. Physical Exam  Constitutional:       Appearance: Normal appearance. She is obese.    HENT: Mouth/Throat:      Mouth: Mucous membranes are moist.   Neck:      Musculoskeletal: Normal range of motion and neck supple. Cardiovascular:      Rate and Rhythm: Normal rate and regular rhythm. Pulses: Normal pulses. Heart sounds: Normal heart sounds. No murmur. Pulmonary:      Effort: Pulmonary effort is normal.      Breath sounds: Normal breath sounds. Abdominal:      General: Bowel sounds are normal.      Palpations: Abdomen is soft. Neurological:      Mental Status: She is alert. Psychiatric:         Mood and Affect: Mood normal.         Thought Content: Thought content normal.         Assessment/Plan:     Diagnoses and all orders for this visit:    1. Hypertension, unspecified type  -     LIPID PANEL  -     METABOLIC PANEL, COMPREHENSIVE  -     CBC W/O DIFF    2. Anxiety and depression    3.  Obesity, morbid ([facility] [address]  -     REFERRAL TO DIETITIAN          Pt expressed understanding with the diagnosis and plan        Discussed expected course/resolution/complications of diagnosis in detail with patient.    Medication risks/benefits/costs/interactions/alternatives discussed with patient.    Pt was given an after visit summary which includes diagnoses, current medications & vitals.  Pt expressed understanding with the diagnosis and plan

## 2020-09-12 LAB
ALBUMIN SERPL-MCNC: 4.1 G/DL (ref 3.8–4.8)
ALBUMIN/GLOB SERPL: 1.3 {RATIO} (ref 1.2–2.2)
ALP SERPL-CCNC: 69 IU/L (ref 39–117)
ALT SERPL-CCNC: 17 IU/L (ref 0–32)
AST SERPL-CCNC: 15 IU/L (ref 0–40)
BILIRUB SERPL-MCNC: 0.2 MG/DL (ref 0–1.2)
BUN SERPL-MCNC: 9 MG/DL (ref 6–20)
BUN/CREAT SERPL: 13 (ref 9–23)
CALCIUM SERPL-MCNC: 8.9 MG/DL (ref 8.7–10.2)
CHLORIDE SERPL-SCNC: 106 MMOL/L (ref 96–106)
CHOLEST SERPL-MCNC: 209 MG/DL (ref 100–199)
CO2 SERPL-SCNC: 22 MMOL/L (ref 20–29)
CREAT SERPL-MCNC: 0.67 MG/DL (ref 0.57–1)
ERYTHROCYTE [DISTWIDTH] IN BLOOD BY AUTOMATED COUNT: 14 % (ref 11.7–15.4)
GLOBULIN SER CALC-MCNC: 3.2 G/DL (ref 1.5–4.5)
GLUCOSE SERPL-MCNC: 90 MG/DL (ref 65–99)
HCT VFR BLD AUTO: 36.7 % (ref 34–46.6)
HDLC SERPL-MCNC: 44 MG/DL
HGB BLD-MCNC: 12.4 G/DL (ref 11.1–15.9)
LDLC SERPL CALC-MCNC: 145 MG/DL (ref 0–99)
MCH RBC QN AUTO: 29.9 PG (ref 26.6–33)
MCHC RBC AUTO-ENTMCNC: 33.8 G/DL (ref 31.5–35.7)
MCV RBC AUTO: 88 FL (ref 79–97)
PLATELET # BLD AUTO: 200 X10E3/UL (ref 150–450)
POTASSIUM SERPL-SCNC: 4 MMOL/L (ref 3.5–5.2)
PROT SERPL-MCNC: 7.3 G/DL (ref 6–8.5)
RBC # BLD AUTO: 4.15 X10E6/UL (ref 3.77–5.28)
SODIUM SERPL-SCNC: 142 MMOL/L (ref 134–144)
TRIGL SERPL-MCNC: 112 MG/DL (ref 0–149)
VLDLC SERPL CALC-MCNC: 20 MG/DL (ref 5–40)
WBC # BLD AUTO: 6.4 X10E3/UL (ref 3.4–10.8)

## 2020-11-04 ENCOUNTER — OFFICE VISIT (OUTPATIENT)
Dept: FAMILY MEDICINE CLINIC | Age: 35
End: 2020-11-04
Payer: MEDICAID

## 2020-11-04 VITALS
RESPIRATION RATE: 18 BRPM | OXYGEN SATURATION: 97 % | HEIGHT: 64 IN | SYSTOLIC BLOOD PRESSURE: 131 MMHG | HEART RATE: 74 BPM | BODY MASS INDEX: 50.02 KG/M2 | DIASTOLIC BLOOD PRESSURE: 65 MMHG | TEMPERATURE: 98 F | WEIGHT: 293 LBS

## 2020-11-04 DIAGNOSIS — N30.90 CYSTITIS: Primary | ICD-10-CM

## 2020-11-04 LAB
BILIRUB UR QL STRIP: NEGATIVE
GLUCOSE UR-MCNC: NEGATIVE MG/DL
KETONES P FAST UR STRIP-MCNC: NEGATIVE MG/DL
PH UR STRIP: 7 [PH] (ref 4.6–8)
PROT UR QL STRIP: NORMAL
SP GR UR STRIP: 1.02 (ref 1–1.03)
UA UROBILINOGEN AMB POC: NORMAL (ref 0.2–1)
URINALYSIS CLARITY POC: NORMAL
URINALYSIS COLOR POC: NORMAL
URINE BLOOD POC: NORMAL
URINE LEUKOCYTES POC: NORMAL
URINE NITRITES POC: NEGATIVE

## 2020-11-04 PROCEDURE — 81003 URINALYSIS AUTO W/O SCOPE: CPT | Performed by: NURSE PRACTITIONER

## 2020-11-04 PROCEDURE — 99213 OFFICE O/P EST LOW 20 MIN: CPT | Performed by: NURSE PRACTITIONER

## 2020-11-04 RX ORDER — CIPROFLOXACIN 250 MG/1
250 TABLET, FILM COATED ORAL EVERY 12 HOURS
Qty: 14 TAB | Refills: 0 | Status: SHIPPED | OUTPATIENT
Start: 2020-11-04 | End: 2020-11-11

## 2020-11-04 RX ORDER — METOPROLOL SUCCINATE 25 MG/1
TABLET, EXTENDED RELEASE ORAL DAILY
COMMUNITY

## 2020-11-04 NOTE — PROGRESS NOTES
Chief Complaint   Patient presents with    Back Pain     right side radiating to the leg for 2 weeks       1. Have you been to the ER, urgent care clinic since your last visit? Hospitalized since your last visit? No    2. Have you seen or consulted any other health care providers outside of the [address] [place] [address] since your last visit? Include any pap smears or colon screening.  No    3 most recent PHQ Screens 11/4/2020   PHQ Not Done -   Little interest or pleasure in doing things -   Feeling down, depressed, irritable, or hopeless Not at all   Total Score PHQ 2 -      Health Maintenance Due   Topic Date Due    DTaP/Tdap/Td series (1 - Tdap) 08/28/2007    PAP AKA CERVICAL CYTOLOGY  08/28/2007

## 2020-11-04 NOTE — PROGRESS NOTES
[address] Note  Subjective:      Ralph Wagoner is a 30 y.o. female who presents for pain in right lower back, radiating to right thigh. She is also complaining of urinary , urgency, and frequency. She is sexually active and denies abnormal vaginal discharge. Past Medical History:   Diagnosis Date    Psychiatric disorder     anxiety    Psychiatric disorder     depression     Past Surgical History:   Procedure Laterality Date    HX ORTHOPAEDIC      right knee        Current Outpatient Medications   Medication Sig Dispense Refill    metoprolol succinate (TOPROL-XL) 25 mg XL tablet Take  by mouth daily.  ciprofloxacin HCl (CIPRO) 250 mg tablet Take 1 Tab by mouth every twelve (12) hours for 7 days. 14 Tab 0    levonorgestreL (MIRENA) 20 mcg/24 hours (5 yrs) 52 mg IUD 1 Intra Uterine Device by IntraUTERine route once.  sertraline (ZOLOFT) 100 mg tablet Take 200 mg by mouth daily.  hydrOXYzine HCl (ATARAX) 50 mg tablet Take 50 mg by mouth as needed for Anxiety.  propranoloL (INDERAL) 20 mg tablet TK 1 T PO BID FOR 14 DAYS      sertraline (Zoloft) 100 mg tablet 2 capsules       No Known Allergies    ROS:   Complete review of systems was reviewed with pertinent information listed in HPI. Review of Systems   Constitutional: Negative. Respiratory: Negative. Cardiovascular: Negative. Gastrointestinal: Negative. Genitourinary: Positive for dysuria, frequency and urgency. Musculoskeletal: Positive for back pain. Objective:     Visit Vitals  /65   Pulse 74   Temp 98 °F (36.7 °C) (Temporal)   Resp 18   Ht 5' 4\" (1.626 m)   Wt 316 lb 9.6 oz (143.6 kg)   SpO2 97%   BMI 54.34 kg/m²       Vitals and Nurse Documentation reviewed. Physical Exam  Constitutional:       Appearance: Normal appearance. She is obese. HENT:      Mouth/Throat:      Mouth: Mucous membranes are moist.   Neck:      Musculoskeletal: Normal range of motion and neck supple. Cardiovascular:      Rate and Rhythm: Normal rate and regular rhythm. Pulses: Normal pulses. Heart sounds: Normal heart sounds. Pulmonary:      Effort: Pulmonary effort is normal.      Breath sounds: Normal breath sounds. Abdominal:      General: Bowel sounds are normal.      Palpations: Abdomen is soft. Genitourinary:     Comments: UAis positive for infection  Neurological:      Mental Status: She is alert. Assessment/Plan:     Diagnoses and all orders for this visit:    1. Cystitis  -     ciprofloxacin HCl (CIPRO) 250 mg tablet;  Take 1 Tab by mouth every twelve (12) hours for 7 days.  -     AMB POC URINALYSIS DIP STICK AUTO W/O MICRO          Pt expressed understanding with the diagnosis and plan        Discussed expected course/resolution/complications of diagnosis in detail with patient.    Medication risks/benefits/costs/interactions/alternatives discussed with patient.    Pt was given an after visit summary which includes diagnoses, current medications & vitals.  Pt expressed understanding with the diagnosis and plan

## 2020-12-07 ENCOUNTER — HOSPITAL ENCOUNTER (EMERGENCY)
Age: 35
Discharge: HOME | End: 2020-12-07
Attending: EMERGENCY MEDICINE
Payer: MEDICAID

## 2020-12-07 ENCOUNTER — APPOINTMENT (OUTPATIENT)
Dept: ULTRASOUND IMAGING | Age: 35
End: 2020-12-07
Attending: EMERGENCY MEDICINE
Payer: MEDICAID

## 2020-12-07 VITALS
RESPIRATION RATE: 16 BRPM | DIASTOLIC BLOOD PRESSURE: 87 MMHG | WEIGHT: 293 LBS | TEMPERATURE: 96.7 F | HEIGHT: 64 IN | SYSTOLIC BLOOD PRESSURE: 132 MMHG | OXYGEN SATURATION: 99 % | BODY MASS INDEX: 50.02 KG/M2 | HEART RATE: 84 BPM

## 2020-12-07 DIAGNOSIS — M79.604 PAIN OF RIGHT LOWER EXTREMITY: Primary | ICD-10-CM

## 2020-12-07 PROCEDURE — 93971 EXTREMITY STUDY: CPT

## 2020-12-07 PROCEDURE — 99282 EMERGENCY DEPT VISIT SF MDM: CPT

## 2020-12-07 RX ORDER — DICLOFENAC SODIUM 75 MG/1
75 TABLET, DELAYED RELEASE ORAL 2 TIMES DAILY
Qty: 15 TAB | Refills: 0 | Status: SHIPPED | OUTPATIENT
Start: 2020-12-07 | End: 2021-02-14

## 2020-12-07 RX ORDER — PREDNISONE 20 MG/1
TABLET ORAL
Qty: 20 TAB | Refills: 0 | Status: SHIPPED | OUTPATIENT
Start: 2020-12-07 | End: 2020-12-07

## 2020-12-07 RX ORDER — PREDNISONE 20 MG/1
TABLET ORAL
Qty: 20 TAB | Refills: 0 | Status: SHIPPED | OUTPATIENT
Start: 2020-12-07 | End: 2021-02-14

## 2020-12-07 RX ORDER — HYDROXYZINE 50 MG/1
50 TABLET, FILM COATED ORAL
COMMUNITY
End: 2021-02-14 | Stop reason: ALTCHOICE

## 2020-12-07 RX ORDER — AMLODIPINE BESYLATE 5 MG/1
TABLET ORAL
COMMUNITY
Start: 2020-11-24

## 2020-12-07 NOTE — ED TRIAGE NOTES
Pt arrives ambulatory to ed with complaints of right leg pain x two weeks. Pt states pain started in thigh and now radiate down leg. Unable to walk when getting out of bed this am.     Denies any injury/trauma/travel/swelling. Tylenol PTA without relief.

## 2020-12-07 NOTE — ED PROVIDER NOTES
HPI the patient has a 2-week history of intermittent pain in her right thigh that radiates into her lower leg. The pain was worse today. She denies having back pain, leg swelling, chest pain, shortness of breath, fever or other complaints. Past Medical History:   Diagnosis Date    Hypertension     Psychiatric disorder     anxiety    Psychiatric disorder     depression       Past Surgical History:   Procedure Laterality Date    HX ORTHOPAEDIC      right knee          History reviewed. No pertinent family history. Social History     Socioeconomic History    Marital status:      Spouse name: Not on file    Number of children: Not on file    Years of education: Not on file    Highest education level: Not on file   Occupational History    Not on file   Social Needs    Financial resource strain: Not on file    Food insecurity     Worry: Not on file     Inability: Not on file    Transportation needs     Medical: Not on file     Non-medical: Not on file   Tobacco Use    Smoking status: Never Smoker    Smokeless tobacco: Never Used   Substance and Sexual Activity    Alcohol use: Never     Frequency: Never    Drug use: Never    Sexual activity: Yes   Lifestyle    Physical activity     Days per week: Not on file     Minutes per session: Not on file    Stress: Not on file   Relationships    Social connections     Talks on phone: Not on file     Gets together: Not on file     Attends Christianity service: Not on file     Active member of club or organization: Not on file     Attends meetings of clubs or organizations: Not on file     Relationship status: Not on file    Intimate partner violence     Fear of current or ex partner: Not on file     Emotionally abused: Not on file     Physically abused: Not on file     Forced sexual activity: Not on file   Other Topics Concern    Not on file   Social History Narrative    Not on file         ALLERGIES: Patient has no known allergies.     Review of Systems   Constitutional: Negative for fever. HENT: Negative for voice change. Eyes: Negative for pain. Respiratory: Negative for cough and shortness of breath. Cardiovascular: Negative for chest pain. Gastrointestinal: Negative for abdominal pain, nausea and vomiting. Genitourinary: Negative for flank pain. Musculoskeletal: Positive for myalgias. Negative for back pain. Skin: Negative for rash. Neurological: Negative for headaches. Psychiatric/Behavioral: Negative for confusion. Vitals:    12/07/20 1344   BP: 132/87   Pulse: 84   Resp: 16   Temp: (!) 96.7 °F (35.9 °C)   SpO2: 99%   Weight: 145.4 kg (320 lb 8.8 oz)   Height: 5' 4\" (1.626 m)            Physical Exam  Constitutional:       General: She is not in acute distress. Appearance: She is well-developed. HENT:      Head: Normocephalic. Neck:      Musculoskeletal: Normal range of motion. Cardiovascular:      Rate and Rhythm: Normal rate. Pulmonary:      Effort: Pulmonary effort is normal.   Musculoskeletal: Normal range of motion. Comments: The right lower extremity is nontender/nonswollen, with full range of motion at the hip knee and ankle. Distal pulses normal.   Skin:     General: Skin is warm and dry. Capillary Refill: Capillary refill takes less than 2 seconds. Neurological:      Mental Status: She is alert.    Psychiatric:         Behavior: Behavior normal.          MDM       Procedures

## 2020-12-07 NOTE — DISCHARGE INSTRUCTIONS
Patient Education        Leg Pain: Care Instructions  Your Care Instructions  Many things can cause leg pain. Too much exercise or overuse can cause a muscle cramp (or charley horse). You can get leg cramps from not eating a balanced diet that has enough potassium, calcium, and other minerals. If you do not drink enough fluids or are taking certain medicines, you may develop leg cramps. Other causes of leg pain include injuries, blood flow problems, nerve damage, and twisted and enlarged veins (varicose veins). You can usually ease pain with self-care. Your doctor may recommend that you rest your leg and keep it elevated. Follow-up care is a key part of your treatment and safety. Be sure to make and go to all appointments, and call your doctor if you are having problems. It's also a good idea to know your test results and keep a list of the medicines you take. How can you care for yourself at home? · Take pain medicines exactly as directed. ? If the doctor gave you a prescription medicine for pain, take it as prescribed. ? If you are not taking a prescription pain medicine, ask your doctor if you can take an over-the-counter medicine. · Take any other medicines exactly as prescribed. Call your doctor if you think you are having a problem with your medicine. · Rest your leg while you have pain, and avoid standing for long periods of time. · Prop up your leg at or above the level of your heart when possible. · Make sure you are eating a balanced diet that is rich in calcium, potassium, and magnesium, especially if you are pregnant. · If directed by your doctor, put ice or a cold pack on the area for 10 to 20 minutes at a time. Put a thin cloth between the ice and your skin. · Your leg may be in a splint, a brace, or an elastic bandage, and you may have crutches to help you walk. Follow your doctor's directions about how long to wear supports and how to use the crutches. When should you call for help? Call 911 anytime you think you may need emergency care. For example, call if:    · You have sudden chest pain and shortness of breath, or you cough up blood.     · Your leg is cool or pale or changes color. Call your doctor now or seek immediate medical care if:    · You have increasing or severe pain.     · Your leg suddenly feels weak and you cannot move it.     · You have signs of a blood clot, such as:  ? Pain in your calf, back of the knee, thigh, or groin. ? Redness and swelling in your leg or groin.     · You have signs of infection, such as:  ? Increased pain, swelling, warmth, or redness. ? Red streaks leading from the sore area. ? Pus draining from a place on your leg. ? A fever.     · You cannot bear weight on your leg. Watch closely for changes in your health, and be sure to contact your doctor if:    · You do not get better as expected. Where can you learn more? Go to http://www.gray.com/  Enter W838 in the search box to learn more about \"Leg Pain: Care Instructions. \"  Current as of: June 26, 2019               Content Version: 12.6  © 5855-8610 [employer]. Care instructions adapted under license by [employer] (which disclaims liability or warranty for this information). If you have questions about a medical condition or this instruction, always ask your healthcare professional. [place] [address] any warranty or liability for your use of this information.

## 2021-02-14 ENCOUNTER — HOSPITAL ENCOUNTER (EMERGENCY)
Age: 36
Discharge: HOME | End: 2021-02-14
Attending: EMERGENCY MEDICINE
Payer: MEDICAID

## 2021-02-14 VITALS
WEIGHT: 293 LBS | SYSTOLIC BLOOD PRESSURE: 132 MMHG | OXYGEN SATURATION: 99 % | DIASTOLIC BLOOD PRESSURE: 87 MMHG | HEART RATE: 93 BPM | BODY MASS INDEX: 56.2 KG/M2 | RESPIRATION RATE: 16 BRPM | TEMPERATURE: 98.3 F

## 2021-02-14 DIAGNOSIS — N39.0 URINARY TRACT INFECTION WITHOUT HEMATURIA, SITE UNSPECIFIED: Primary | ICD-10-CM

## 2021-02-14 LAB
APPEARANCE UR: ABNORMAL
BACTERIA URNS QL MICRO: NEGATIVE /HPF
BILIRUB UR QL: NEGATIVE
COLOR UR: YELLOW
EPITH CASTS URNS QL MICRO: ABNORMAL /LPF
GLUCOSE UR STRIP.AUTO-MCNC: NEGATIVE MG/DL
HCG UR QL: NEGATIVE
HGB UR QL STRIP: ABNORMAL
KETONES UR QL STRIP.AUTO: NEGATIVE MG/DL
LEUKOCYTE ESTERASE UR QL STRIP.AUTO: ABNORMAL
NITRITE UR QL STRIP.AUTO: NEGATIVE
PH UR STRIP: 6 [PH] (ref 5–8)
PROT UR STRIP-MCNC: NEGATIVE MG/DL
RBC #/AREA URNS HPF: ABNORMAL /HPF (ref 0–5)
SP GR UR REFRACTOMETRY: 1.02 (ref 1–1.03)
SP GR UR REFRACTOMETRY: 1.02 (ref 1–1.03)
UR CULT HOLD, URHOLD: NORMAL
UROBILINOGEN UR QL STRIP.AUTO: 0.2 EU/DL (ref 0.2–1)
WBC URNS QL MICRO: ABNORMAL /HPF (ref 0–4)

## 2021-02-14 PROCEDURE — 74011250637 HC RX REV CODE- 250/637: Performed by: EMERGENCY MEDICINE

## 2021-02-14 PROCEDURE — 87077 CULTURE AEROBIC IDENTIFY: CPT

## 2021-02-14 PROCEDURE — 87086 URINE CULTURE/COLONY COUNT: CPT

## 2021-02-14 PROCEDURE — 99284 EMERGENCY DEPT VISIT MOD MDM: CPT

## 2021-02-14 PROCEDURE — 87186 SC STD MICRODIL/AGAR DIL: CPT

## 2021-02-14 PROCEDURE — 81025 URINE PREGNANCY TEST: CPT

## 2021-02-14 PROCEDURE — 81001 URINALYSIS AUTO W/SCOPE: CPT

## 2021-02-14 RX ORDER — CIPROFLOXACIN 500 MG/1
500 TABLET ORAL 2 TIMES DAILY
Qty: 14 TAB | Refills: 0 | Status: SHIPPED | OUTPATIENT
Start: 2021-02-14 | End: 2021-02-21

## 2021-02-14 RX ADMIN — LEVOFLOXACIN 750 MG: 500 TABLET, FILM COATED ORAL at 16:01

## 2021-02-14 NOTE — Clinical Note
[facility] 
[facility] 
[address] [id number] 
[phone] 
 
Work/School Note 
 
Date: 2/14/2021 
 
To Whom It May concern: 
 
Micheline Silva was seen and treated today in the emergency room by the following provider(s): 
Attending Provider: Magnant, Charles D, MD.   
 
Micheline Silva is excused from work/school on 02/14/21 and 02/15/21.   
 
She is medically clear to return to work/school on 2/16/2021.  
 
 
Sincerely, 
 
 
 
 
Charles D Magnant, MD

## 2021-02-14 NOTE — ED NOTES

## 2021-02-14 NOTE — ED PROVIDER NOTES
HPI   The patient is a 30-year-old black female who presents to the emergency room with acute onset of right flank pain associated with burning and frequency with urination. She states she thinks is a UTI has urgency hesitancy and dysuria. She has some mild right flank pain associated with it. She denies fever chills nausea or vomiting or other systemic symptoms. Past Medical History:   Diagnosis Date    Hypertension     Psychiatric disorder     anxiety    Psychiatric disorder     depression       Past Surgical History:   Procedure Laterality Date    HX ORTHOPAEDIC      right knee          No family history on file.     Social History     Socioeconomic History    Marital status:      Spouse name: Not on file    Number of children: Not on file    Years of education: Not on file    Highest education level: Not on file   Occupational History    Not on file   Social Needs    Financial resource strain: Not on file    Food insecurity     Worry: Not on file     Inability: Not on file    Transportation needs     Medical: Not on file     Non-medical: Not on file   Tobacco Use    Smoking status: Never Smoker    Smokeless tobacco: Never Used   Substance and Sexual Activity    Alcohol use: Never     Frequency: Never    Drug use: Never    Sexual activity: Yes   Lifestyle    Physical activity     Days per week: Not on file     Minutes per session: Not on file    Stress: Not on file   Relationships    Social connections     Talks on phone: Not on file     Gets together: Not on file     Attends [employer] service: Not on file     Active member of club or organization: Not on file     Attends meetings of clubs or organizations: Not on file     Relationship status: Not on file    Intimate partner violence     Fear of current or ex partner: Not on file     Emotionally abused: Not on file     Physically abused: Not on file     Forced sexual activity: Not on file   Other Topics Concern    Not on file Social History Narrative    Not on file         ALLERGIES: Patient has no known allergies. Review of Systems   All other systems reviewed and are negative. Vitals:    02/14/21 1457   BP: (!) 155/94   Pulse: (!) 104   Resp: 16   Temp: 98.3 °F (36.8 °C)   SpO2: 99%   Weight: 148.5 kg (327 lb 6.1 oz)            Physical Exam  Vitals signs reviewed. Constitutional:       Appearance: She is normal weight. HENT:      Nose: Nose normal.      Mouth/Throat:      Mouth: Mucous membranes are moist.   Eyes:      Pupils: Pupils are equal, round, and reactive to light. Neck:      Musculoskeletal: Normal range of motion. Cardiovascular:      Rate and Rhythm: Bradycardia present. Pulmonary:      Effort: Pulmonary effort is normal.   Abdominal:      General: Abdomen is flat. Comments: Minimal suprapubic and right CVA angle tenderness   Skin:     General: Skin is warm. Capillary Refill: Capillary refill takes less than 2 seconds. Neurological:      General: No focal deficit present. Mental Status: She is alert. Mental status is at baseline. Psychiatric:         Mood and Affect: Mood normal.         Thought Content:  Thought content normal.         Judgment: Judgment normal.          MDM       Procedures

## 2021-02-14 NOTE — ED TRIAGE NOTES
Triage Note: Patient complains of right flank pain that radiates to mid/lower abdomen x 1 week. Patient reports dysuria and urinary frequency that started today.

## 2021-02-16 LAB
BACTERIA SPEC CULT: ABNORMAL
CC UR VC: ABNORMAL
SERVICE CMNT-IMP: ABNORMAL

## 2022-03-18 PROBLEM — F41.0 PANIC ATTACKS: Status: ACTIVE | Noted: 2020-08-06

## 2022-03-18 PROBLEM — R06.02 SOB (SHORTNESS OF BREATH): Status: ACTIVE | Noted: 2020-08-06

## 2022-03-18 PROBLEM — F43.12 CHRONIC POST-TRAUMATIC STRESS DISORDER (PTSD): Status: ACTIVE | Noted: 2020-08-06

## 2022-03-19 PROBLEM — R00.2 RAPID PALPITATIONS: Status: ACTIVE | Noted: 2020-08-06

## 2022-03-20 PROBLEM — E66.01 OBESITY, MORBID (HCC): Status: ACTIVE | Noted: 2020-08-06

## 2022-09-30 ENCOUNTER — OFFICE VISIT (OUTPATIENT)
Dept: FAMILY MEDICINE CLINIC | Age: 37
End: 2022-09-30
Payer: MEDICAID

## 2022-09-30 VITALS
OXYGEN SATURATION: 99 % | HEIGHT: 64 IN | BODY MASS INDEX: 50.02 KG/M2 | WEIGHT: 293 LBS | TEMPERATURE: 98.1 F | RESPIRATION RATE: 16 BRPM | HEART RATE: 96 BPM | SYSTOLIC BLOOD PRESSURE: 149 MMHG | DIASTOLIC BLOOD PRESSURE: 72 MMHG

## 2022-09-30 DIAGNOSIS — N64.4 BREAST PAIN: ICD-10-CM

## 2022-09-30 DIAGNOSIS — I10 HYPERTENSION, UNSPECIFIED TYPE: Primary | ICD-10-CM

## 2022-09-30 DIAGNOSIS — Z11.59 NEED FOR HEPATITIS C SCREENING TEST: ICD-10-CM

## 2022-09-30 PROCEDURE — 99213 OFFICE O/P EST LOW 20 MIN: CPT | Performed by: NURSE PRACTITIONER

## 2022-09-30 RX ORDER — ATORVASTATIN CALCIUM 20 MG/1
20 TABLET, FILM COATED ORAL DAILY
COMMUNITY
Start: 2022-09-21 | End: 2022-10-02

## 2022-09-30 RX ORDER — HYDROXYZINE 50 MG/1
TABLET, FILM COATED ORAL
COMMUNITY
Start: 2022-09-16

## 2022-09-30 NOTE — PROGRESS NOTES
[address] Note  Subjective:      Yuli Kelley is a 40 y.o. female who presents for C/O bilateral breast pain x 1 months, currently she has pain on her left breast. Denies breast lump of nipple discharge. She has history of hypertension, morbid obesity and depression, taking medications as prescribed, no medications side effects reported    Past Medical History:   Diagnosis Date    Depression     Hypertension     Psychiatric disorder     anxiety    Psychiatric disorder     depression     Past Surgical History:   Procedure Laterality Date    HX ORTHOPAEDIC      right knee      Current Outpatient Medications   Medication Sig Dispense Refill    atorvastatin (LIPITOR) 20 mg tablet Take 20 mg by mouth daily.  hydrOXYzine HCL (ATARAX) 50 mg tablet TAKE 1 TABLET BY MOUTH DAILY AS NEEDED FOR ANXIETY      amLODIPine (NORVASC) 5 mg tablet       metoprolol succinate (TOPROL-XL) 25 mg XL tablet Take  by mouth daily.  levonorgestreL (MIRENA) 20 mcg/24 hours (5 yrs) 52 mg IUD 1 Intra Uterine Device by IntraUTERine route once.  sertraline (ZOLOFT) 100 mg tablet Take 200 mg by mouth daily. No Known Allergies    ROS:   Complete review of systems was reviewed with pertinent information listed in HPI. Review of Systems   Constitutional: Negative. HENT: Negative. Respiratory: Negative. Cardiovascular: Negative. Gastrointestinal: Negative. Genitourinary: Negative. Musculoskeletal: Negative. Psychiatric/Behavioral:  Positive for depression. Objective:   Visit Vitals  BP (!) 149/72 (BP 1 Location: Right lower arm, BP Patient Position: Sitting, BP Cuff Size: Large adult)   Pulse 96   Temp 98.1 °F (36.7 °C) (Temporal)   Resp 16   Ht 5' 4\" (1.626 m)   Wt 339 lb 9.6 oz (154 kg)   SpO2 99%   BMI 58.29 kg/m²       Vitals and Nurse Documentation reviewed. Physical Exam  Constitutional:       Appearance: Normal appearance.    HENT:      Mouth/Throat: Mouth: Mucous membranes are moist.   Cardiovascular:      Rate and Rhythm: Normal rate and regular rhythm. Pulses: Normal pulses. Heart sounds: Normal heart sounds. No murmur heard. Pulmonary:      Effort: Pulmonary effort is normal.      Breath sounds: Normal breath sounds. Abdominal:      General: Bowel sounds are normal.      Palpations: Abdomen is soft. Musculoskeletal:      Cervical back: Normal range of motion and neck supple. Neurological:      Mental Status: She is alert. Psychiatric:         Mood and Affect: Mood normal.         Thought Content: Thought content normal.       Assessment/Plan:     Diagnoses and all orders for this visit:    1. Hypertension, unspecified type  -     LIPID PANEL; Future  -     METABOLIC PANEL, COMPREHENSIVE; Future  -     CBC W/O DIFF; Future    2. Need for hepatitis C screening test  -     HEPATITIS C AB; Future    3. Breast pain  -     US BREAST LT COMPLETE 4 QUAD; Future  -     US BREAST RT COMPLETE 4 QUAD; Future    Await labs and US      Pt expressed understanding with the diagnosis and plan        Discussed expected course/resolution/complications of diagnosis in detail with patient. Medication risks/benefits/costs/interactions/alternatives discussed with patient. Pt was given an after visit summary which includes diagnoses, current medications & vitals.   Pt expressed understanding with the diagnosis and plan

## 2022-09-30 NOTE — PROGRESS NOTES
Chief Complaint   Patient presents with    Breast pain     left         1. \"Have you been to the ER, urgent care clinic since your last visit? Hospitalized since your last visit? \" No    2. \"Have you seen or consulted any other health care providers outside of the [address] [place] [address] since your last visit? \" No     3. For patients over 45: Has the patient had a colonoscopy? NA - based on age     If the patient is female:    4. For patients over 36: Has the patient had a mammogram? NA - based on age    11. For patients over 21: Has the patient had a pap smear?  Yes - no Care Gap present     3 most recent PHQ Screens 9/30/2022   PHQ Not Done -   Little interest or pleasure in doing things Not at all   Feeling down, depressed, irritable, or hopeless Not at all   Total Score PHQ 2 0       Health Maintenance Due   Topic Date Due    Hepatitis C Screening  Never done    Depression Monitoring  Never done    DTaP/Tdap/Td series (1 - Tdap) Never done    COVID-19 Vaccine (3 - Booster for Pfizer series) 10/26/2021    Flu Vaccine (1) 08/01/2022

## 2022-10-01 LAB
ALBUMIN SERPL-MCNC: 4.2 G/DL (ref 3.8–4.8)
ALBUMIN/GLOB SERPL: 1.2 {RATIO} (ref 1.2–2.2)
ALP SERPL-CCNC: 89 IU/L (ref 44–121)
ALT SERPL-CCNC: 23 IU/L (ref 0–32)
AST SERPL-CCNC: 20 IU/L (ref 0–40)
BILIRUB SERPL-MCNC: 0.3 MG/DL (ref 0–1.2)
BUN SERPL-MCNC: 8 MG/DL (ref 6–20)
BUN/CREAT SERPL: 11 (ref 9–23)
CALCIUM SERPL-MCNC: 9.1 MG/DL (ref 8.7–10.2)
CHLORIDE SERPL-SCNC: 105 MMOL/L (ref 96–106)
CHOLEST SERPL-MCNC: 164 MG/DL (ref 100–199)
CO2 SERPL-SCNC: 24 MMOL/L (ref 20–29)
CREAT SERPL-MCNC: 0.7 MG/DL (ref 0.57–1)
EGFR: 115 ML/MIN/1.73
ERYTHROCYTE [DISTWIDTH] IN BLOOD BY AUTOMATED COUNT: 13.8 % (ref 11.7–15.4)
GLOBULIN SER CALC-MCNC: 3.6 G/DL (ref 1.5–4.5)
GLUCOSE SERPL-MCNC: 103 MG/DL (ref 70–99)
HCT VFR BLD AUTO: 40.1 % (ref 34–46.6)
HCV AB S/CO SERPL IA: <0.1 S/CO RATIO (ref 0–0.9)
HDLC SERPL-MCNC: 44 MG/DL
HGB BLD-MCNC: 12.9 G/DL (ref 11.1–15.9)
IMP & REVIEW OF LAB RESULTS: NORMAL
LDLC SERPL CALC-MCNC: 97 MG/DL (ref 0–99)
MCH RBC QN AUTO: 28.9 PG (ref 26.6–33)
MCHC RBC AUTO-ENTMCNC: 32.2 G/DL (ref 31.5–35.7)
MCV RBC AUTO: 90 FL (ref 79–97)
PLATELET # BLD AUTO: 246 X10E3/UL (ref 150–450)
POTASSIUM SERPL-SCNC: 4.3 MMOL/L (ref 3.5–5.2)
PROT SERPL-MCNC: 7.8 G/DL (ref 6–8.5)
RBC # BLD AUTO: 4.47 X10E6/UL (ref 3.77–5.28)
SODIUM SERPL-SCNC: 142 MMOL/L (ref 134–144)
TRIGL SERPL-MCNC: 128 MG/DL (ref 0–149)
VLDLC SERPL CALC-MCNC: 23 MG/DL (ref 5–40)
WBC # BLD AUTO: 7.1 X10E3/UL (ref 3.4–10.8)

## 2022-10-02 RX ORDER — ATORVASTATIN CALCIUM 10 MG/1
10 TABLET, FILM COATED ORAL DAILY
Qty: 90 TABLET | Refills: 0 | Status: SHIPPED | OUTPATIENT
Start: 2022-10-02

## 2022-10-11 ENCOUNTER — HOSPITAL ENCOUNTER (OUTPATIENT)
Dept: MAMMOGRAPHY | Age: 37
Discharge: HOME | End: 2022-10-11
Payer: MEDICAID

## 2022-10-11 DIAGNOSIS — N64.4 BREAST PAIN: ICD-10-CM

## 2022-10-11 DIAGNOSIS — Z12.31 VISIT FOR SCREENING MAMMOGRAM: ICD-10-CM

## 2022-10-11 DIAGNOSIS — R92.8 ABNORMAL MAMMOGRAM: ICD-10-CM

## 2022-10-11 PROCEDURE — 77063 BREAST TOMOSYNTHESIS BI: CPT

## 2022-10-11 PROCEDURE — 77065 DX MAMMO INCL CAD UNI: CPT

## 2022-10-11 NOTE — PROGRESS NOTES
Called Dr. Mason Gutierrez office and spoke with Skinny mendez. Advised that a Right breast biopsy recommendation has been made by the radiologist. Asked to please fax order to [phone].

## 2022-10-12 DIAGNOSIS — R89.7 ABNORMAL BREAST BIOPSY: Primary | ICD-10-CM

## 2022-10-12 RX ORDER — ALPRAZOLAM 0.5 MG/1
TABLET ORAL
Qty: 2 TABLET | Refills: 0 | Status: SHIPPED | OUTPATIENT
Start: 2022-10-12

## 2022-10-20 ENCOUNTER — HOSPITAL ENCOUNTER (OUTPATIENT)
Dept: MAMMOGRAPHY | Age: 37
Discharge: HOME | End: 2022-10-20
Payer: MEDICAID

## 2022-10-20 DIAGNOSIS — R92.8 ABNORMAL MAMMOGRAM: ICD-10-CM

## 2022-10-20 PROCEDURE — 77065 DX MAMMO INCL CAD UNI: CPT

## 2022-10-20 PROCEDURE — 74011000250 HC RX REV CODE- 250: Performed by: RADIOLOGY

## 2022-10-20 PROCEDURE — 19081 BX BREAST 1ST LESION STRTCTC: CPT

## 2022-10-20 PROCEDURE — 88305 TISSUE EXAM BY PATHOLOGIST: CPT

## 2022-10-20 PROCEDURE — C1819 TISSUE LOCALIZATION-EXCISION: HCPCS

## 2022-10-20 RX ORDER — LIDOCAINE HYDROCHLORIDE AND EPINEPHRINE 20; 10 MG/ML; UG/ML
10 INJECTION, SOLUTION INFILTRATION; PERINEURAL ONCE
Status: COMPLETED | OUTPATIENT
Start: 2022-10-20 | End: 2022-10-20

## 2022-10-20 RX ORDER — LIDOCAINE HYDROCHLORIDE 10 MG/ML
15 INJECTION INFILTRATION; PERINEURAL
Status: COMPLETED | OUTPATIENT
Start: 2022-10-20 | End: 2022-10-20

## 2022-10-20 RX ADMIN — LIDOCAINE HYDROCHLORIDE 15 ML: 10 INJECTION, SOLUTION INFILTRATION; PERINEURAL at 11:35

## 2022-10-20 RX ADMIN — LIDOCAINE HYDROCHLORIDE 15 ML: 10 INJECTION, SOLUTION INFILTRATION; PERINEURAL at 11:45

## 2022-10-20 RX ADMIN — LIDOCAINE HYDROCHLORIDE AND EPINEPHRINE 200 MG: 20; 10 INJECTION, SOLUTION INFILTRATION; PERINEURAL at 11:35

## 2022-10-20 RX ADMIN — LIDOCAINE HYDROCHLORIDE AND EPINEPHRINE 200 MG: 20; 10 INJECTION, SOLUTION INFILTRATION; PERINEURAL at 11:45

## 2022-10-20 NOTE — PROGRESS NOTES
Patient tolerated right breast biopsy x 2 sites well with scant bleeding. Biopsy sites were bandaged in the usual fashion and discharge instructions were reviewed with the patient. She was provided with a written copy as well. Advised patient that results should be available in 2-3 business days and that she will receive a phone call. Encouraged her to call with any questions or concerns.

## 2022-10-26 ENCOUNTER — TELEPHONE (OUTPATIENT)
Dept: FAMILY MEDICINE CLINIC | Age: 37
End: 2022-10-26

## 2022-10-26 NOTE — TELEPHONE ENCOUNTER
Outbound call to patient. Name and  verified. Let patient know that we have not got the results back of biopsy per provider and told patient we would reach out to her once we did have results.

## 2022-10-28 NOTE — PROGRESS NOTES
Pathology approved by Dr. Faye Szymanski. Called patient and relayed benign results. Advised patient that she should begin annual mammograms when she is 41. She reports that her biopsy site is healing well with no concerns. Encouraged her to call with any question or concerns.

## 2022-11-02 ENCOUNTER — TELEPHONE (OUTPATIENT)
Dept: FAMILY MEDICINE CLINIC | Age: 37
End: 2022-11-02

## 2022-11-02 NOTE — TELEPHONE ENCOUNTER
Call and left voicemail for pt to return call to office.  Was calling to read  biopsy result per provider

## 2022-12-16 RX ORDER — ATORVASTATIN CALCIUM 10 MG/1
10 TABLET, FILM COATED ORAL DAILY
Qty: 90 TABLET | Refills: 0 | Status: SHIPPED | OUTPATIENT
Start: 2022-12-16

## 2022-12-28 ENCOUNTER — OFFICE VISIT (OUTPATIENT)
Dept: FAMILY MEDICINE CLINIC | Age: 37
End: 2022-12-28
Payer: MEDICAID

## 2022-12-28 VITALS
RESPIRATION RATE: 16 BRPM | SYSTOLIC BLOOD PRESSURE: 127 MMHG | TEMPERATURE: 98 F | HEIGHT: 64 IN | OXYGEN SATURATION: 98 % | BODY MASS INDEX: 50.02 KG/M2 | HEART RATE: 87 BPM | WEIGHT: 293 LBS | DIASTOLIC BLOOD PRESSURE: 73 MMHG

## 2022-12-28 DIAGNOSIS — E55.9 VITAMIN D DEFICIENCY: ICD-10-CM

## 2022-12-28 DIAGNOSIS — E66.01 MORBID OBESITY WITH BODY MASS INDEX OF 50.0-59.9 IN ADULT (HCC): ICD-10-CM

## 2022-12-28 DIAGNOSIS — I10 HYPERTENSION, UNSPECIFIED TYPE: Primary | ICD-10-CM

## 2022-12-28 PROCEDURE — 3078F DIAST BP <80 MM HG: CPT | Performed by: NURSE PRACTITIONER

## 2022-12-28 PROCEDURE — 3074F SYST BP LT 130 MM HG: CPT | Performed by: NURSE PRACTITIONER

## 2022-12-28 PROCEDURE — 99213 OFFICE O/P EST LOW 20 MIN: CPT | Performed by: NURSE PRACTITIONER

## 2022-12-28 NOTE — PROGRESS NOTES
[facility] Note  Subjective:      Liza House is a 40 y.o. female who presents for following up on chronic problems . She has history of hypertension, hyperlipidemia, morbid obesity and anxiety. She is considering weight loss surgery as she is unable to lose weight by diet , needs referral to a general surgeon. Past Medical History:   Diagnosis Date    Depression     Hypertension     Psychiatric disorder     anxiety    Psychiatric disorder     depression     Past Surgical History:   Procedure Laterality Date    HX ORTHOPAEDIC      right knee      Current Outpatient Medications   Medication Sig Dispense Refill    atorvastatin (LIPITOR) 10 mg tablet TAKE 1 TABLET BY MOUTH DAILY 90 Tablet 0    hydrOXYzine HCL (ATARAX) 50 mg tablet TAKE 1 TABLET BY MOUTH DAILY AS NEEDED FOR ANXIETY      amLODIPine (NORVASC) 5 mg tablet       metoprolol succinate (TOPROL-XL) 25 mg XL tablet Take  by mouth daily. levonorgestreL (MIRENA) 20 mcg/24 hours (5 yrs) 52 mg IUD 1 Intra Uterine Device by IntraUTERine route once. sertraline (ZOLOFT) 100 mg tablet Take 200 mg by mouth daily. ALPRAZolam (XANAX) 0.5 mg tablet Take 1 pill 15 minutes before procedure , may repeat x 1 (Patient not taking: Reported on 12/28/2022) 2 Tablet 0     No Known Allergies    ROS:   Complete review of systems was reviewed with pertinent information listed in HPI. Review of Systems   Constitutional: Negative. HENT: Negative. Respiratory: Negative. Cardiovascular: Negative. Genitourinary: Negative. Musculoskeletal: Negative. Psychiatric/Behavioral: Negative. Objective:   Visit Vitals  /73 (BP 1 Location: Left arm, BP Patient Position: Sitting, BP Cuff Size: Large adult)   Pulse 87   Temp 98 °F (36.7 °C) (Temporal)   Resp 16   Ht 5' 4\" (1.626 m)   Wt 341 lb (154.7 kg)   LMP 11/24/2022   SpO2 98%   BMI 58.53 kg/m²       Vitals and Nurse Documentation reviewed.      Physical Exam  Constitutional:       Appearance: Normal appearance. She is obese. HENT:      Mouth/Throat:      Mouth: Mucous membranes are moist.   Cardiovascular:      Rate and Rhythm: Normal rate and regular rhythm. Pulses: Normal pulses. Heart sounds: Normal heart sounds. No murmur heard. Pulmonary:      Effort: Pulmonary effort is normal.      Breath sounds: Normal breath sounds. Abdominal:      Palpations: Abdomen is soft. Musculoskeletal:      Cervical back: Normal range of motion and neck supple. Neurological:      Mental Status: She is alert. Psychiatric:         Mood and Affect: Mood normal.         Thought Content: Thought content normal.       Assessment/Plan:     Diagnoses and all orders for this visit:    1. Hypertension, unspecified type  -     CBC W/O DIFF; Future  -     LIPID PANEL; Future  -     METABOLIC PANEL, COMPREHENSIVE; Future    2. Vitamin D deficiency  -     VITAMIN D, 25 HYDROXY; Future    3. Morbid obesity with body mass index of 50.0-59.9 in adult ([facility] [address]  -     REFERRAL TO WEIGHT LOSS          Pt expressed understanding with the diagnosis and plan        Discussed expected course/resolution/complications of diagnosis in detail with patient. Medication risks/benefits/costs/interactions/alternatives discussed with patient. Pt was given an after visit summary which includes diagnoses, current medications & vitals.   Pt expressed understanding with the diagnosis and plan

## 2022-12-28 NOTE — PROGRESS NOTES
Chief Complaint   Patient presents with    Weight Loss         1. \"Have you been to the ER, urgent care clinic since your last visit? Hospitalized since your last visit? \" No    2. \"Have you seen or consulted any other health care providers outside of the [address] [place] [address] since your last visit? \" No     3. For patients aged 39-70: Has the patient had a colonoscopy / FIT/ Cologuard? NA - based on age      If the patient is female:    4. For patients aged 41-77: Has the patient had a mammogram within the past 2 years? NA - based on age or sex      11. For patients aged 21-65: Has the patient had a pap smear?  Yes - no Care Gap present         3 most recent PHQ Screens 12/28/2022   PHQ Not Done -   Little interest or pleasure in doing things Several days   Feeling down, depressed, irritable, or hopeless Several days   Total Score PHQ 2 2       Health Maintenance Due   Topic Date Due    COVID-19 Vaccine (1) Never done    DTaP/Tdap/Td series (1 - Tdap) Never done    Flu Vaccine (1) 08/01/2022

## 2022-12-29 LAB
25(OH)D3+25(OH)D2 SERPL-MCNC: 24.6 NG/ML (ref 30–100)
ALBUMIN SERPL-MCNC: 4.1 G/DL (ref 3.8–4.8)
ALBUMIN/GLOB SERPL: 1.2 {RATIO} (ref 1.2–2.2)
ALP SERPL-CCNC: 84 IU/L (ref 44–121)
ALT SERPL-CCNC: 23 IU/L (ref 0–32)
AST SERPL-CCNC: 19 IU/L (ref 0–40)
BILIRUB SERPL-MCNC: 0.3 MG/DL (ref 0–1.2)
BUN SERPL-MCNC: 9 MG/DL (ref 6–20)
BUN/CREAT SERPL: 16 (ref 9–23)
CALCIUM SERPL-MCNC: 8.9 MG/DL (ref 8.7–10.2)
CHLORIDE SERPL-SCNC: 104 MMOL/L (ref 96–106)
CHOLEST SERPL-MCNC: 167 MG/DL (ref 100–199)
CO2 SERPL-SCNC: 22 MMOL/L (ref 20–29)
CREAT SERPL-MCNC: 0.56 MG/DL (ref 0.57–1)
EGFR: 121 ML/MIN/1.73
ERYTHROCYTE [DISTWIDTH] IN BLOOD BY AUTOMATED COUNT: 13.6 % (ref 11.7–15.4)
GLOBULIN SER CALC-MCNC: 3.3 G/DL (ref 1.5–4.5)
GLUCOSE SERPL-MCNC: 115 MG/DL (ref 70–99)
HCT VFR BLD AUTO: 38.4 % (ref 34–46.6)
HDLC SERPL-MCNC: 41 MG/DL
HGB BLD-MCNC: 12.9 G/DL (ref 11.1–15.9)
IMP & REVIEW OF LAB RESULTS: NORMAL
LDLC SERPL CALC-MCNC: 109 MG/DL (ref 0–99)
MCH RBC QN AUTO: 29.7 PG (ref 26.6–33)
MCHC RBC AUTO-ENTMCNC: 33.6 G/DL (ref 31.5–35.7)
MCV RBC AUTO: 89 FL (ref 79–97)
PLATELET # BLD AUTO: 220 X10E3/UL (ref 150–450)
POTASSIUM SERPL-SCNC: 3.8 MMOL/L (ref 3.5–5.2)
PROT SERPL-MCNC: 7.4 G/DL (ref 6–8.5)
RBC # BLD AUTO: 4.34 X10E6/UL (ref 3.77–5.28)
SODIUM SERPL-SCNC: 138 MMOL/L (ref 134–144)
TRIGL SERPL-MCNC: 93 MG/DL (ref 0–149)
VLDLC SERPL CALC-MCNC: 17 MG/DL (ref 5–40)
WBC # BLD AUTO: 7.9 X10E3/UL (ref 3.4–10.8)

## 2022-12-30 RX ORDER — ACETAMINOPHEN 500 MG
2000 TABLET ORAL 2 TIMES DAILY
Qty: 90 CAPSULE | Refills: 4 | Status: SHIPPED | OUTPATIENT
Start: 2022-12-30

## 2023-01-10 ENCOUNTER — OFFICE VISIT (OUTPATIENT)
Dept: SURGERY | Age: 38
End: 2023-01-10
Payer: MEDICAID

## 2023-01-10 VITALS
BODY MASS INDEX: 50.02 KG/M2 | TEMPERATURE: 97.9 F | SYSTOLIC BLOOD PRESSURE: 149 MMHG | RESPIRATION RATE: 20 BRPM | HEIGHT: 64 IN | OXYGEN SATURATION: 96 % | HEART RATE: 95 BPM | DIASTOLIC BLOOD PRESSURE: 82 MMHG | WEIGHT: 293 LBS

## 2023-01-10 DIAGNOSIS — R06.83 SNORES: ICD-10-CM

## 2023-01-10 DIAGNOSIS — E66.01 OBESITY, MORBID (HCC): Primary | ICD-10-CM

## 2023-01-10 DIAGNOSIS — K21.9 GASTROESOPHAGEAL REFLUX DISEASE WITHOUT ESOPHAGITIS: ICD-10-CM

## 2023-01-10 DIAGNOSIS — I10 PRIMARY HYPERTENSION: ICD-10-CM

## 2023-01-10 PROCEDURE — 3079F DIAST BP 80-89 MM HG: CPT | Performed by: SURGERY

## 2023-01-10 PROCEDURE — 99204 OFFICE O/P NEW MOD 45 MIN: CPT | Performed by: SURGERY

## 2023-01-10 PROCEDURE — 3077F SYST BP >= 140 MM HG: CPT | Performed by: SURGERY

## 2023-01-10 NOTE — PROGRESS NOTES
Bariatric Surgery Consultation    Subjective: The patient is a 40 y.o. obese  female with a Body mass index is 58.36 kg/m². .  The patient has been at her heaviest weight for the past 2 years;  she has been overweight since child-bearing years; she has been considering surgery since 2022;  she desires surgery at this time because medical efforts weight loss of been unsuccessful. Noa Coronado has tried multiple diets in her lifetime most recently tried unsupervised diets. Bariatric comorbidities present are   Patient Active Problem List   Diagnosis Code    Obesity, morbid (HCC) E66.01    Panic attacks F41.0    Rapid palpitations R00.2    Chronic post-traumatic stress disorder (PTSD) F43.12    SOB (shortness of breath) R06.02    Depression F32. A    Primary hypertension I10    Snores R06.83    Gastroesophageal reflux disease without esophagitis K21.9     The patient desires laparoscopic gastric bypass surgery for surgical weight loss. The patients goal weight is 160 lbs. The highest acceptable weight is 190 lbs. These goals are consistent with expected outcomes of their desired operation. her Medical goals are hypertension resolution; her qualty of life goals are decreased fatigue.     Patient Active Problem List    Diagnosis Date Noted    Primary hypertension 01/10/2023    Snores 01/10/2023    Gastroesophageal reflux disease without esophagitis 01/10/2023    Depression     Obesity, morbid (HCC) 08/06/2020    Panic attacks 08/06/2020    Rapid palpitations 08/06/2020    Chronic post-traumatic stress disorder (PTSD) 08/06/2020    SOB (shortness of breath) 08/06/2020      Past Surgical History:   Procedure Laterality Date    HX ORTHOPAEDIC      right knee     HX ORTHOPAEDIC  2022    carpal tunnel right and left      Social History     Tobacco Use    Smoking status: Never    Smokeless tobacco: Never   Substance Use Topics    Alcohol use: Never      Family History   Problem Relation Age of Onset Hypertension Mother     Heart Disease Father       Prior to Admission medications    Medication Sig Start Date End Date Taking? Authorizing Provider   cholecalciferol (VITAMIN D3) (2,000 UNITS /50 MCG) cap capsule Take 1 Capsule by mouth two (2) times a day. 12/30/22  Yes Genoveva Joyce NP   atorvastatin (LIPITOR) 10 mg tablet TAKE 1 TABLET BY MOUTH DAILY 12/16/22  Yes Kasey Joyce NP   hydrOXYzine HCL (ATARAX) 50 mg tablet TAKE 1 TABLET BY MOUTH DAILY AS NEEDED FOR ANXIETY 9/16/22  Yes Provider, Historical   amLODIPine (NORVASC) 5 mg tablet  11/24/20  Yes Other, MD David   metoprolol succinate (TOPROL-XL) 25 mg XL tablet Take  by mouth daily. Yes Provider, Historical   levonorgestreL (MIRENA) 20 mcg/24 hours (5 yrs) 52 mg IUD 1 Intra Uterine Device by IntraUTERine route once. Yes Provider, Historical   sertraline (ZOLOFT) 100 mg tablet Take 200 mg by mouth daily. Yes Other, MD David     No Known Allergies      Review of Systems:    Review of Systems   Constitutional:  Negative for chills, fever and weight loss. HENT: Negative. Eyes: Negative. Respiratory:  Positive for shortness of breath. Negative for cough and wheezing. Cardiovascular:  Negative for chest pain, palpitations and leg swelling. Gastrointestinal:  Positive for heartburn. Negative for abdominal pain, diarrhea, nausea and vomiting. Genitourinary: Negative. Musculoskeletal:  Positive for back pain, joint pain and neck pain. Skin: Negative. Neurological: Negative. Endo/Heme/Allergies: Negative. Psychiatric/Behavioral: Negative.          Objective:   Visit Vitals  BP (!) 149/82 (BP 1 Location: Left lower arm, BP Patient Position: Sitting, BP Cuff Size: Large adult)   Pulse 95   Temp 97.9 °F (36.6 °C)   Resp 20   Ht 5' 4\" (1.626 m)   Wt 340 lb (154.2 kg)   SpO2 96%   BMI 58.36 kg/m²       Physical Exam:  GENERAL: alert, cooperative, no distress, appears stated age, morbidly obese, EYE: negative, LYMPHATIC: No cervical or supraclavicular adenopathy. THROAT & NECK: normal, LUNG: clear to auscultation bilaterally, HEART: regular rate and rhythm, S1, S2 normal, no murmur. ABDOMEN: NABS, nondistended, soft. No pain with palpation, mass, or hernia. EXTREMITIES:  extremities normal, atraumatic, no cyanosis or edema, SKIN: Normal., NEUROLOGIC: negative      Assessment:     Morbid obesity with comorbidity; no success with medical management. Plan:     laparoscopic gastric bypass surgery    This is a 40 y.o. female with a BMI of Body mass index is 58.36 kg/m². and the weight-related co-morbidities listed above. Garland Blackwell meets the NIH criteria for bariatric surgery based upon the BMI of Body mass index is 58.36 kg/m². and multiple weight-related co-morbidities. Garland Blackwell has elected laparoscopic temitope-en-Y gastric bypass as her intervention of choice for treatment of morbid obesity through surgical means secondary to its long term history of success. In the office today, following Micheline's history and physical examination, a 30 minute discussion regarding the anatomic alterations for the laparoscopic temitope-en-Y gastric bypass was undertaken. The dietary expectations and the patient and physician dependent factors for success were thoroughly discussed, to include the need for interval follow-up and long-term dietary changes associated with success. The possible complications of the temitope-en-Y gastric bypass  were also discussed, to include VTE, staple line leak, bleeding, stricture, infection, internal hernia, conversion open procedure, ulcer, nutritional deficiency, poor weight loss/weight regain, and pouch dilation. Specific weight related outcomes for success were also discussed with an emphasis on careful and close follow-up with the first year. The patient expressed an understanding of the above factors, and her questions were answered in their entirety.     In addition, the patient attended a 1.5 hour power point seminar regarding obesity, surgical weight loss including, adjustable gastric band, gastric bypass, and sleeve gastrectomy. This discussion contrasted the different surgical techniques, mechanisms of actions and expected outcomes, and surgical and medical risks associated with each procedure. During this seminar, there was a long question and answer session where each questions was answered until there were no additional questions. Today, the patient had all of her questions answered and desires to proceed with pre-qualification for bariatric surgery initially choosing temitope-en-Y gastric bypass as her surgical option. She will schedule psychology evaluation and initiate supervised medical weight loss program.  We will order dietitian evaluation, sleep medicine referral given high likelihood of sleep apnea, and upper GI series given reflux symptoms, to assess for hiatal hernia. We discussed her [employer] status, and reviewed how we would manage her postoperatively if there were concerns for bleeding as transfusion is not an option. We reviewed our preoperative weight loss goals and she understands she will need to lose 16 pounds in order to proceed with intended surgery.       Signed By: Gerson Nicole MD     January 10, 2023

## 2023-01-10 NOTE — PROGRESS NOTES
Identified pt with two pt identifiers (name and ). Reviewed chart in preparation for visit and have obtained necessary documentation. Zuleyma Diaz is a 40 y.o. female  Chief Complaint   Patient presents with    New Patient     New bariatric patient interested in lap sleeve gastrectomy     Visit Vitals  BP (!) 149/82 (BP 1 Location: Left lower arm, BP Patient Position: Sitting, BP Cuff Size: Large adult)   Pulse 95   Temp 97.9 °F (36.6 °C)   Resp 20   Ht 5' 4\" (1.626 m)   Wt 340 lb (154.2 kg)   SpO2 96%   BMI 58.36 kg/m²       1. Have you been to the ER, urgent care clinic since your last visit? Hospitalized since your last visit? new patient    2. Have you seen or consulted any other health care providers outside of the [address] [place] [address] since your last visit? Include any pap smears or colon screening. New patient        Patient and provider made aware of elevated BP x2. Patient asymptomatic. Patient reminded to monitor BP, continue to take BP medications if prescribed, and follow up with PCP/Cardiologist.  Patient expressed understanding and agreement.

## 2023-01-11 ENCOUNTER — DOCUMENTATION ONLY (OUTPATIENT)
Dept: SURGERY | Age: 38
End: 2023-01-11

## 2023-01-11 ENCOUNTER — TELEPHONE (OUTPATIENT)
Dept: SURGERY | Age: 38
End: 2023-01-11

## 2023-01-20 ENCOUNTER — HOSPITAL ENCOUNTER (OUTPATIENT)
Dept: GENERAL RADIOLOGY | Age: 38
Discharge: HOME | End: 2023-01-20
Attending: SURGERY
Payer: MEDICAID

## 2023-01-20 DIAGNOSIS — K21.9 GASTROESOPHAGEAL REFLUX DISEASE WITHOUT ESOPHAGITIS: ICD-10-CM

## 2023-01-20 PROCEDURE — 74246 X-RAY XM UPR GI TRC 2CNTRST: CPT

## 2023-01-26 ENCOUNTER — OFFICE VISIT (OUTPATIENT)
Dept: SURGERY | Age: 38
End: 2023-01-26
Payer: MEDICAID

## 2023-01-26 DIAGNOSIS — E66.01 MORBID OBESITY (HCC): ICD-10-CM

## 2023-01-26 DIAGNOSIS — F32.A DEPRESSIVE DISORDER: ICD-10-CM

## 2023-01-26 DIAGNOSIS — F43.12 CHRONIC POST-TRAUMATIC STRESS DISORDER (PTSD): Primary | ICD-10-CM

## 2023-01-26 DIAGNOSIS — F41.0 PANIC ATTACKS: ICD-10-CM

## 2023-01-26 NOTE — PROGRESS NOTES
[facility]  Bariatric Psychosocial Evaluation - Part 1    This note will not be viewable [facility] for the following reason(s). This is a Psychotherapy Note. ([address] [facility] [address] Providers Only)    Date of Intake: 23  Start Time:  9:01 AM  End Time:  10:20 AM  CPT code: 35864  Telehealth:  NO     Name: Lima Fields      :  1985   Gender:  female   Marital Status:     Race/Ethnicity:  BLACK/   Type of Service:  95310 - Psychiatric Diagnostic Evaluation    REASON FOR REFERRAL:    Mrs. Lima Fields is a 40year-old, , , female referred by her surgeon, Dr. Justus Maldonado, to determine her appropriateness for bariatric surgery. She is 5 feet 4 inches tall and her total weight today is 340 lbs. The purpose of the evaluation is to assess personality, psychopathology, emotional functioning, and health behavior adherence through clinical interview and psychological testing to identify factors that might provide challenges to optimal recovery for bariatric/metabolic surgery (BMS). HISTORY OF PRESENTING PROBLEM:    Mrs. Niecy Jordan reported having struggled with her weight since the age of 26years-old. The patient stated that her lowest adult weight was 240 lbs. and highest adult weight was 340 lbs. Patient endorsed the following contributors to her weight problems:  ?  Genetics    ? Poor Eating Habits  ? Inconsistency  ? Sedentary Lifestyle []  Injury or Illness   []  Other:    Past and Current Maladaptive Eating Behaviors reported:  ?  Grazing/Frequent Snacking  ? Night Eating  []  Binge Eating  Notes (i.e. Frequency, intensity, past history of, etc): This patient reports that she engages in frequent snacking throughout the day and reports that she finds she eats more food in the evening (a larger meal and frequent snacking).   She states that snacking has been her biggest struggle and states that her meal size has also varied, depending on what she is eating. Previous Attempts to Lose Weight include the following:  ?  Structured Diets (e.g. Weight Watchers)     ? At-home Diets (e.g. calorie counting, etc.)  []  Working with dietician or physician             ? Exercise  []  Prescription Medications for weight loss    []  OTC Medications for weight loss  []  Starving/Purging/Over-Exercising (or other unhealthy weight loss attempts)    Patient reported being unable to keep the weight off for the following reasons:  ? Too restrictive  []  Frustrated with slow results []  Bored with plan  ? Inconsistency []  Injury or Illness   []  Emotional Eating  []  Other:    Patients are informed that some of the same barriers to prior weight loss may still exist following surgery. Personalized recommendations are provided to overcome hurdles after surgery, and patients are encouraged to utilize a dietitian and/or psychologist/therapist if they notice that they are beginning to have difficulty adhering to the program following surgery. Interested patients are given a recommended reading list and encouraged to use a blank journal to begin food journaling so that they can more accurately understand and evaluate patterns that have led to non-compliance. Mrs. Sylvester May reportedly began seriously considering surgery for weight loss about a year ago. Mrs. Sylvester May reported that her weight has a great impact on her life and her reasons for seeking surgery include:    ?  Improving health    ? Decreasing future health risks ? Maintaining weight loss  ? Improving quality of life   ? Improved Appearance  []  Other:    QUALITY OF LIFE:      Mrs. Sylvester May reported that challenges with her weight have significantly impacted her quality of life in the following areas:  ?  Physical Functioning  []  Level of [place] ? Psychological Health   ? Social Relationships []  Work/Environment  ? Spirituality/Faith  ?   Sex Life   []  Other:        [address] [place] [address]  HABITS:    Mrs. Skylar Hoskins stated that she currently eats 2-3 meals/day. She describes her meals as moderate-large in size. The patient reported that she is currently working on increasing her water intake, exercising 30 min. per day, eliminating sodas and meal planning. She has not yet started with the dieticians. In preparing for surgery, Mrs. Skylar Hoskins reported making the following changes in her health regimen:         Completed Progress Requires Follow-up    Eating 3 meals    []  ? []  Limited Snacking   []  []  ? Decreasing Carbohydrates  []  []  ? Increasing Protein   []  []  ? Smaller Meals    []  ? []  Increasing Water Intake  ? []  []  Exercise     []  ? []  Eliminating Caffeine   ? []  []  Eliminating Carbonation  []  ? []  Meal Planning    []  ? []  Not drinking with Meals  []  []  ? Small Bites    []  []  ? Chewing Carefully   []  []  ? Mrs. Skylar Hoskins believes that of all the changes she is required to make, cutting out the sweets and snacking on sweets will be the most difficult. Patients are encouraged to begin making as many changes prior to the surgery as possible. During session, time is spent discussing which changes they believe will be the most difficult and why (e.g. smaller portions, types of food, exercise, etc.). Specific suggestions for easing transition are offered dependent on the patients past and current struggles with weight (e.g., food journal, support groups, etc). Weight History:  How old were you when you first became concerned with your weight? 28years-old  Most successful diet in the past? Weight Watchers  Weight lost on the diet listed above? 20 lbs. Patient stated she maintained her weight for the following time? 3 months  How much control over your eating do you feel you have? Mrs. Skylar Hoskins states that she feels she is improving in this area, but in the past has felt she has very little control over her eating. Reported Current weight:  340 lbs.    The patient expects to loose 150-160 lbs. following surgery over 12 months. Goal weight post surgery: 180 lbs. Other expectations - (how they conceptualize their need for surgery, the period of time for weight loss and how they feel this will impact their life, are expectations realistic?):  Mrs. Dipti Friend states that she feels that surgery is very important for her due to her ongoing health issues (High BP, previous surgeries on  her knees and hands). She stated that she was given an overview of the surgery and has a general idea of weight loss. However, she appears to expect a very significant amount of weight loss. Cravings: For what types of food: sweets, sweet cereal with milk  Strategies used to deal with cravings: drinking water to help with decreasing her appetite     Eating habits:  Mrs. Dipti Friend reports that she usually skips breakfast or eats a very small breakfast, she then snacks throughout the day, eats a moderate-moderately large lunch and then a large dinner. She states that she continues to snack on sweets throughout the day and into the evening. Emotional eating:  Mrs. Dipti Friend has been experiencing significant depression and is unable to distinguish at this time when she is emotionally eating. She does not meet criteria for binge eating per her report. PHYSICAL ACTIVITY (past and present):    Past physical activity for Mrs. Dipti Friend have included walking and doing weight machines at the gym. Current physical activity for Mrs. Dipti Friend includes walking. She states that last week she started walking 30 min. per day at her gym ([address]). Daily activity level:  Mrs. Dipti Friend states that she is currently on long-term disability for PTSD from her work as a  from an incident in 2015.   She states that she typically helps her son get ready, takes her son to school in the morning, goes to the gym to walk, does chores and home and then rests for the remainder of the day.      SLEEP PATTERN:    Mrs. Michael Velasco reported that she usually goes to sleep around 10:30 pm and wakes up at around 6:30 am.  However, she reports that she sleeps very poorly and is getting up multiple times throughout the night. It takes her about 10 minutes to fall asleep, but she states on average she gets about 5 hrs. of sleep per night. Stays asleep    []  Yes  ? No  Falls Rested During the Day:  []  Yes  ? No  Sleep Disorder   []  None  []  Sleep Apnea ? Insomnia      MEDICAL:    Please refer to the patient's medical record for full medical background. Pertinent medical history and current medications were reviewed with Mrs. Michael Velasco. She reports taking the following psychotropic medications for mood and anxiety:  Sertraline (Zoloft) and Hydroxyzine, PRN. She appears to listen, understand and adhere to medical advice. Ability to read and understand verbal and written material related to the medical procedures appears to be within normal limits. SUBSTANCE USE:    Mrs. Michael Velasco was educated on the heightened risk of increased addiction potential post-surgery. Patient was reminded that she is to wait at least 12 months post-surgery to consume alcohol. Abstinence from nicotine and other drugs is strongly advised. (family history, substances used)    Alcohol Use:   Denied past and current use of alcohol  Tobacco Use:  Denied past and current use of tobacco  Drug Use:    Denied past and current use of illicit drugs    PSYCHIATRIC HISTORY AND CURRENT TREATMENT/PRESENTATION:    Mental Health Examination:      Within normal limits    Past/Historical Diagnosis/es:    PTSD,  Depression, Panic Attacks  Current Treatment:       None currently -reported receiving   counseling services for 1 year in 2016  after an incident in her work that led to PTSD and depression (2015).  She is currently prescribed the follow meds:  Sertraline (Zoloft) and Hydroxyzine, PRN   Suicidal Ideation/Plan/Intent/Attempts:  Denied  Homicidal   Ideation/Plan/Intent/Attempts:    Denied  Psychiatric Hospitalizations:    Denied    PSYCHOLOGICAL ASSESSMENT:    Mental Status Exam, patient was dressed properly. No abnormal psychomotor movements observed. Intellectual functioning appeared to be intact. Mood and affect were congruent. Insight was adequate. Judgment was adequate. Speech was normal, clear. Thought process was coherent. Patient did not report suicidal or homicidal ideations, intent or plans. Patient denied self-injury behaviors. Patient denied alcohol and other substance abuse. Patient was oriented to the four spheres: self, place, time and situation. Patient response to intervention was appropriate. Patient progress was adequate. Protective factor: self-determination. In addition to the clinical interview, the following assessments were conducted, scored and interpreted by Mikel Holman LPC, AMPARO Hancock:  Patient Health Questionnaire (PHQ-9); Generalized Anxiety Disorder 7 (LUCAS-7); PTSD Checklist (PCL-C); Adult ADHD Self Report Scale (ASRS); Adverse Childhood Experience Scale - Revised (ACE - Revised); Binge Eating Disorder Screener-7 (BEDS-7)); Alcohol Screening (AUDIT); and the Drug Abuse Screening Test (DAST-10). Performances of these tests were as follows:    ACE-Revised (Measure of adverse childhood experiences):  Score of 2 - intermediate risk for toxic stress physiology  ADHD Scale (Measure of inattention and hyperactivity):  within normal limits  AUDIT (Measure of alcohol intake/use/abuse): within normal limits  BEDS-7 (Measure of binge eating/disordering eating behaviors): mildly elevated   DAST (Measure of drug use/abuse):  within normal limits  LUCAS-7 (Measure of anxiety): within normal limits  PCL-C PTSD Checklist (Measure of trauma symptoms): within normal limits  PHQ-9 (Measure of depression): Mild depression symptoms    CURRENT STRESSORS/SUPPORT/COPING STRATEGIES:    Mrs. Michael Velasco reported having adequate support for the surgery to include her , mother, mother-in-law and sister-in-law. The following were reported as current life stressors by Mrs. Deandra Ya:  []  Significant Others   []  Work   ? Current Health Issues/Weight  []  Family Issues  []  Finances   []  Other:    Mrs. Deandra Ya reported that she mikey with stressors in the following manner:  []  Emotional Eating*   []  Journaling   ? Talking with her   []  Counseling  []  Exercise   ? Other: goes to sleep    Mrs. Deandra Ya was provided with information about the possibility of psychological changes following surgery to include depression, anxiety and transfer of addiction. Mrs. Deandra Ya was strongly encouraged to seek help if she notices changes in her mood or behaviors. The patient was reminded that support groups are available following surgery and that she should continue to utilize them to assist in transition post-surgery. Mrs. Deandra Ya was advised to contact a healthcare professional (her surgeon, primary care provider, or the undersigned clinician) should symptoms of depression or anxiety occur and become unmanageable. BEHAVIORAL PSYCHOLOGY EDUCATION    Areas of improvement discussed were as follows:  Eating more slowly  Practicing mindfulness while eating  Plan meals, avoid skipping meals, and ensure there is enough protein in each meal  Reduce exposure to foods known to be triggering or tempting  Develop healthy coping strategies and increase exercise  Ensure adequate sleep as fatigue is often a trigger for binge eating  Food journaling, adding mood into the journal pre- and post-overeating  Creating more of growth mindset when it comes to nutritional/weight loss goals    Taking goals one meal at a time, recognizing the utility in the mindset of being mostly compliant  Some areas of low self-worth related to weight were noted.   While these typically resolve with weight loss, therapy was presented as an option to further enhance self-worth. PATIENT MOTIVATION AND KNOWLEDGE:    Motivation for surgery:  Mrs. Lucio Villalobos is moderately motivated for the surgery. She has not yet met with the dietitian so she is not yet familiar with the dietary changes needed pre- and post-surgery. She has at her highest weight ever and reports she would like to lose weight to improve her health, her social relationships, physical functioning, sex life, psychological health (feels depressed) and her spiritual connectedness (attends [employer] via zoom because she is worried about being judged for her weight gain). Understanding of procedure:  Mrs. Lucio Villalobos appears to have a basic understanding of the procedure and the steps involved in the recovery. She is also aware that she will have more dietary restrictions after the surgery. Her step-mother, who lives about an hour away from her, had bariatric surgery a number of year ago and she reports her mother-in-law has done well with maintenance since the surgery. Encounter Diagnoses     ICD-10-CM ICD-9-CM   1. Chronic post-traumatic stress disorder (PTSD)  F43.12 309.81   2. Depressive disorder  F32. A 311   3. Panic attacks  F41.0 300.01   4. Morbid obesity ([address]  E66.01 278.01        RECOMMENDATIONS:  TBD - Patient will meet with this evaluator for follow-up to discuss recommendations and/or requirements for mental health services. Plans:  Patient will return on 2/8/23 for a follow-up appointment to discuss recommendations and/or requirements from the bariatric psychosocial evaluation.   __________________________________________________    Signature: Julian Melendez, CLARKE, NCC, CCTP   Date:  1/26/23

## 2023-01-30 ENCOUNTER — CLINICAL SUPPORT (OUTPATIENT)
Dept: SURGERY | Age: 38
End: 2023-01-30

## 2023-01-30 DIAGNOSIS — E66.01 MORBID OBESITY (HCC): Primary | ICD-10-CM

## 2023-01-30 NOTE — PROGRESS NOTES
Pre-operative Bariatric Nutrition Evaluation    Date: 2023              Session 1 of 6    Insurance: [facility]            Physician/Surgeon: Dr. Asad Nino      Name: Sam Ayers  :  1985  Age:  40  Gender: Female  Type of Surgery: [x]   Gastric Bypass   []    Sleeve Gastrectomy    ASSESSMENT:    Past Medical History: HTN, GERD, depression/anxiety, hyperlipidemia     Medications/Supplements:   Prior to Admission medications    Medication Sig Start Date End Date Taking? Authorizing Provider   cholecalciferol (VITAMIN D3) (2,000 UNITS /50 MCG) cap capsule Take 1 Capsule by mouth two (2) times a day. 22   Samy Joyce NP   atorvastatin (LIPITOR) 10 mg tablet TAKE 1 TABLET BY MOUTH DAILY 22   Samy Joyce NP   hydrOXYzine HCL (ATARAX) 50 mg tablet TAKE 1 TABLET BY MOUTH DAILY AS NEEDED FOR ANXIETY 22   Provider, Historical   amLODIPine (NORVASC) 5 mg tablet  20   Other, MD David   metoprolol succinate (TOPROL-XL) 25 mg XL tablet Take  by mouth daily. Provider, Historical   levonorgestreL (MIRENA) 20 mcg/24 hours (5 yrs) 52 mg IUD 1 Intra Uterine Device by IntraUTERine route once. Provider, Historical   sertraline (ZOLOFT) 100 mg tablet Take 200 mg by mouth daily. Other, MD David         Smoking: None  Alcohol: None    Food Allergies/Intolerances: None    Anthropometrics:    Ht:64 inches   Wt: 332 lbs (RD scale)                   Starting wt: 340 lbs (1/10)    IBW: 120 lbs    %IBW: 276     BMI:57    Category: Obesity class III    Reported wt history: Pt presents today for pre-op nutrition evaluation for wt loss surgery. Reports lowest adult BW of 180 lbs and highest adult BW of 346 lbs. Attributes wt gain over the years r/t pregnancy, emotional eating, physical inactivity . Has attempted wt loss through various methods with most successful wt loss of 20 lbs .  Has been unable to maintain long term or significant wt loss and is now seeking approval for weight loss surgery. Pt will need to complete 6 months of supervised weight loss for insurance requirements. Surgeon recommending 16 lb wt loss before surgery. Exercise/Physical Activity: Walking (treadmill at gym) 5x week for 30 min    Reported Diet History: Weight watchers, Keto    24 Hour Diet Recall  Breakfast  Cereal or bfast sandwich or eggs or pancakes   Snacks     Lunch  sandwich   Snacks  Chips, crackers   Dinner  Spaghetti or chix, broccoli   Snacks     Beverages  Water, 1 soda per day, sweet tea   Out to eat/take out 2x week. Emotional eating noted    Environment/Psychosocial/Support: Pt ranks support 10 out of 10 consisting of  and family. Pt mother in law and friend have had bariatric surgery. NUTRITION DIAGNOSIS:  Food and nutrition related knowledge deficit r/t lack of prior exposure to information evidenced by pt demonstrates need for nutrition education for gastric bypass. NUTRITION INTERVENTION:  Pt educated on nutrition recommendations for weight loss surgery, specifically gastric bypass. Instructed on consuming 3 meals per day starting now. Use the balanced plate method to plan meals, include 3 oz of lean source of protein, 1/2 cup whole grains, unlimited non-starchy vegetables, 1/2 cup fruit and 1 serving of low fat dairy. Utilize handouts listing healthy snack and meal ideas to limit restaurant meals. After surgery measure all meals to 1/2 cup. Each meal will contain a 1/4 cup lean protein and 1/4 cup fruit, non-starchy vegetable or starch (limiting to once per day). Aim for 60 g protein per day. Sip on 48-64 oz of sugar free, calorie free, non-carbonated beverages each day. Do not use a straw. Do not consume beverages 30 minutes before, during or 30 minutes after meals. Read all nutrition labels. Demonstrated and emphasized identifying serving size, total fat, sugar and protein content. Defined low fat as </= 3 g per serving.  Discussed lean and extra lean sources of protein. Provided list of low fat cooking methods. Avoid foods with sugar listed in the first 3 ingredients and >/15 g sugar per serving. Excess sugar/fat intake may lead to dumping syndrome. Discussed signs and symptoms of dumping syndrome. Practice mindful eating habits; take small bites, chew thoroughly, avoid distractions, utilize hunger/fullness scale. Consume meals over 20-30 minutes. Attend Bariatric Support Group and increase physical activity (approved per MD) for long term weight maintenance. NUTRITION MONITORING AND EVALUATION:    The following goals were established with patient;  Continue to walk 5x week for 30 min  Use portion control at meals- balanced plate  Reduce fast food to <2x week  4. Review nutrition education materials. Follow up next month for continue nutrition education. Specific tips and techniques to facilitate compliance with above recommendations were provided and discussed. Nutrition evaluation reveals important lifestyle changes are indicated. Goals set and recommendations made. Will continue to assess. If further details are desired please feel free to contact me at [phone]. This phone number was also provided to the patient for any further questions or concerns.            Shadia Alvarez RD

## 2023-01-31 ENCOUNTER — OFFICE VISIT (OUTPATIENT)
Dept: SLEEP MEDICINE | Age: 38
End: 2023-01-31

## 2023-01-31 VITALS
DIASTOLIC BLOOD PRESSURE: 64 MMHG | WEIGHT: 293 LBS | HEART RATE: 84 BPM | TEMPERATURE: 98.1 F | BODY MASS INDEX: 50.02 KG/M2 | OXYGEN SATURATION: 97 % | SYSTOLIC BLOOD PRESSURE: 138 MMHG | HEIGHT: 64 IN

## 2023-01-31 DIAGNOSIS — I10 PRIMARY HYPERTENSION: ICD-10-CM

## 2023-01-31 DIAGNOSIS — E66.2 HYPOVENTILATION ASSOCIATED WITH OBESITY SYNDROME (HCC): ICD-10-CM

## 2023-01-31 DIAGNOSIS — Z86.59 H/O: DEPRESSION: ICD-10-CM

## 2023-01-31 DIAGNOSIS — G47.33 OSA (OBSTRUCTIVE SLEEP APNEA): Primary | ICD-10-CM

## 2023-01-31 PROCEDURE — 99204 OFFICE O/P NEW MOD 45 MIN: CPT | Performed by: INTERNAL MEDICINE

## 2023-01-31 PROCEDURE — 3078F DIAST BP <80 MM HG: CPT | Performed by: INTERNAL MEDICINE

## 2023-01-31 PROCEDURE — 3075F SYST BP GE 130 - 139MM HG: CPT | Performed by: INTERNAL MEDICINE

## 2023-01-31 NOTE — PROGRESS NOTES
[address] [facility] [place]. [place], [address]  Tel.  [phone]  Fax. 100 [facility] 60  [place], [address] [facility]  Tel.  [phone]  Fax. [phone] [address]  Tel.  [phone]  Fax. [phone]       Gisela Hall is a 40y.o. year old female referred by Dr. Martha Ortiz   for evaluation of a sleep disorder. ASSESSMENT/PLAN:      ICD-10-CM ICD-9-CM    1. JOSEPH (obstructive sleep apnea)  G47.33 327.23 POLYSOMNOGRAPHY 1 NIGHT      2. Hypoventilation associated with obesity syndrome (HCC)  E66.2 278.03 POLYSOMNOGRAPHY 1 NIGHT      3. Primary hypertension  I10 401.9       4. H/O: depression  Z86.59 V11.8       5. BMI 50.0-59.9, adult [facility])  [id number] V85.43           Patient has a history and examination consistent with the diagnosis of sleep apnea. Follow-up and Dispositions    Return for telephone follow-up after testing is completed. * The patient currently has a Moderate Risk for having sleep apnea. STOP-BANG score 5.    * Sleep testing was ordered for initial evaluation. Orders Placed This Encounter    POLYSOMNOGRAPHY 1 NIGHT     Standing Status:   Future     Standing Expiration Date:   4/30/2023     Scheduling Instructions:      Perform ETCO2 monitoring during Polysomnography             Perform testing with patient sleeping on her back     Order Specific Question:   Reason for Exam     Answer:   JOSEPH / OHS       * She was provided information on sleep apnea including corresponding risk factors and the importance of proper treatment. * Treatment options were reviewed in detail. she would like to proceed with PAP therapy. Patient will be seen in follow-up in 6-8 weeks after PAP setup to gauge treatment response and adherence to therapy. * The patient was counseled regarding proper sleep hygiene, with emphasis on ensuring sufficient total sleep time; safe driving and the benefits of exercise and weight loss. * All of her questions were addressed. 2.  Hypoventilation associated with obesity syndrome (HCC) - Elevated bicarbonate levels on metabolic screen in the absence of any significant cardio-pulmonary problem is suggestive of hypoventilation associated with obesity. EtCO2 levels will be monitored during attended polysomnography, this cannot be done on home sleep apnea testing. 3. Hypertension -  continue on current regimen - amLODIPine (NORVASC) 5 mg tablet and metoprolol succinate (TOPROL-XL) 25 mg XL tablet, she will continue to monitor her BP and follow up with her primary care provider for reevaluation/adjustment of medications if warranted. I have reviewed the relationship between hypertension as it relates to sleep-disordered breathing. 4. H/O Depression -  continue on current regimen - sertraline (ZOLOFT) 100 mg tablet, she will continue to monitor her mood and follow up with her care provider for reevaluation/adjustment of medications if warranted. I have reviewed the relationship between mood as it relates to sleep-disordered breathing. 5. Recommended a dedicated weight loss program through appropriate diet and exercise regimen as significant weight reduction has been shown to reduce severity of obstructive sleep apnea. SUBJECTIVE/OBJECTIVE:    Keith Zaragoza is an 40 y.o. female referred for evaluation for a sleep disorder. She complains of snoring associated with awakening in the middle of the night because of no specific. Patient reports of poor sleep quality which is not restorative, she feels tired and fatigued during the day. Symptoms began 5 years ago, gradually worsening since that time. She usually can fall asleep in 30 minutes. Family or house members note snoring. She denies of symptoms indicative of cataplexy, sleep paralysis or sleep related hallucinations. She denies of a history of unusual movements occurring during sleep.     Keith Zaragoza (P) does wake up frequently at night. She (P) is not bothered by waking up too early and left unable to get back to sleep. She actually sleeps about (P) 5 hours at night and wakes up about (P) 3 times during the night. She (P) does not work shifts:  .   Crissie Finger indicates she (P) does not get too little sleep at night. Her bedtime is (P) 2200. She awakens at (P) 0100. She (P) does take naps. She takes (P) 4 naps a week lasting (P) 30 to 45 minutes. She has the following observed behaviors: (P) Loud snoring, Light snoring;  . Other remarks:      CO2 20 - 29 mmol/L 22  24  22  27        The patient has not undergone diagnostic testing for the current problems. Review of Systems:  Constitutional:  No significant weight loss or weight gain  Eyes:  No blurred vision  CVS:  No significant chest pain  Pulm:  No significant shortness of breath  GI:  No significant nausea or vomiting  :  No significant nocturia  Musculoskeletal:  No significant joint pain at night  Skin:  No significant rashes  Neuro:  No significant dizziness   Psych:  No active mood issues    Sleep Review of Systems: notable for Positive difficulty falling asleep; Positive awakenings at night; Positive perceived regular dreaming; Negative nightmares; Negative  early morning headaches; Negative  memory problems; Negative  concentration issues; Negative caffeine;  Negative alcohol;   Negative history of any automobile or occupational accidents due to daytime drowsiness. [place] Sleepiness Score: (P) 7   and Modified F.O.S.Q. Score Total / 2: (P) 17.5    Visit Vitals  /64   Pulse 84   Temp 98.1 °F (36.7 °C)   Ht 5' 4\" (1.626 m)   Wt 338 lb 9.6 oz (153.6 kg)   SpO2 97%   BMI 58.12 kg/m²    Neck circ.  in \"inches\": 17      General:   Alert, oriented, not in acute distress   Eyes:  Anicteric Sclerae; intact EOM's   Nose:  No obvious nasal septum deviation    Oropharynx:   Mallampati score 4, thick tongue base, uvula not seen due to low-lying soft palate, narrow tonsilo-pharyngeal pilars, tongue scalloped   Neck:   midline trachea,  no JVD   Chest/Lungs:  symmetrical lung expansion ,clear lung fields on auscultation    CVS:  Normal rate, regular rhythm    Extremities:  No obvious rashes, absent edema    Neuro:  No focal deficits; No obvious tremor    Psych:  Normal eye contact; normal  affect, normal countenance          Gloria Nolasco MD, FAASM  Diplomate American Board of Sleep Medicine  Diplomate in Sleep Medicine - ABP    Electronically signed.  01/31/23

## 2023-01-31 NOTE — PATIENT INSTRUCTIONS
217 [facility]., [place]. [place], [address]  Tel.  [phone]  Fax. 100 [facility] 60  [place], [address] [facility]  Tel.  [phone]  Fax. [phone] [address]  Tel.  [phone]  Fax. [phone]     Sleep Apnea: After Your Visit  Your Care Instructions  Sleep apnea occurs when you frequently stop breathing for 10 seconds or longer during sleep. It can be mild to severe, based on the number of times per hour that you stop breathing or have slowed breathing. Blocked or narrowed airways in your nose, mouth, or throat can cause sleep apnea. Your airway can become blocked when your throat muscles and tongue relax during sleep. Sleep apnea is common, occurring in 1 out of 20 individuals. Individuals having any of the following characteristics should be evaluated and treated right away due to high risk and detrimental consequences from untreated sleep apnea:  Obesity  Congestive Heart failure  Atrial Fibrillation  Uncontrolled Hypertension  Type II Diabetes  Night-time Arrhythmias  Stroke  Pulmonary Hypertension  High-risk Driving Populations (pilots, truck drivers, etc.)  Patients Considering Weight-loss Surgery    How do you know you have sleep apnea? You probably have sleep apnea if you answer 'yes' to 3 or more of the following questions:  S - Have you been told that you Snore? T - Are you often Tired during the day? O - Has anyone Observed you stop breathing while sleeping? P- Do you have (or are being treated for) high blood Pressure? B - Are you obese (Body Mass Index > 35)? A - Is your Age 48years old or older? N - Is your Neck size greater than 16 inches? G - Are you male Gender? A sleep physician can prescribe a breathing device that prevents tissues in the throat from blocking your airway. Or your doctor may recommend using a dental device (oral breathing device) to help keep your airway open.  In some cases, surgery may be needed to remove enlarged tissues in the throat. Follow-up care is a key part of your treatment and safety. Be sure to make and go to all appointments, and call your doctor if you are having problems. It's also a good idea to know your test results and keep a list of the medicines you take. How can you care for yourself at home? Lose weight, if needed. It may reduce the number of times you stop breathing or have slowed breathing. Go to bed at the same time every night. Sleep on your side. It may stop mild apnea. If you tend to roll onto your back, sew a pocket in the back of your [email or web address] top. Put a tennis ball into the pocket, and stitch the pocket shut. This will help keep you from sleeping on your back. Avoid alcohol and medicines such as sleeping pills and sedatives before bed. Do not smoke. Smoking can make sleep apnea worse. If you need help quitting, talk to your doctor about stop-smoking programs and medicines. These can increase your chances of quitting for good. Prop up the head of your bed 4 to 6 inches by putting bricks under the legs of the bed. Treat breathing problems, such as a stuffy nose, caused by a cold or allergies. Use a continuous positive airway pressure (CPAP) breathing machine if lifestyle changes do not help your apnea and your doctor recommends it. The machine keeps your airway from closing when you sleep. If CPAP does not help you, ask your doctor whether you should try other breathing machines. A bilevel positive airway pressure machine has two types of air pressureâone for breathing in and one for breathing out. Another device raises or lowers air pressure as needed while you breathe. If your nose feels dry or bleeds when using one of these machines, talk with your doctor about increasing moisture in the air. A humidifier may help.   If your nose is runny or stuffy from using a breathing machine, talk with your doctor about using decongestants or a corticosteroid nasal spray.  When should you call for help? Watch closely for changes in your health, and be sure to contact your doctor if:  You still have sleep apnea even though you have made lifestyle changes. You are thinking of trying a device such as CPAP. You are having problems using a CPAP or similar machine. Where can you learn more? Go to [employer]. Enter E530 in the search box to learn more about \"Sleep Apnea: After Your Visit. \"   © 9833-6804 Healthwise, Incorporated. Care instructions adapted under license by [employer] (which disclaims liability or warranty for this information). This care instruction is for use with your licensed healthcare professional. If you have questions about a medical condition or this instruction, always ask your healthcare professional. Yolande Pacer any warranty or liability for your use of this information. PROPER SLEEP HYGIENE    What to avoid  Do not have drinks with caffeine, such as coffee or black tea, for 8 hours before bed. Do not smoke or use other types of tobacco near bedtime. Nicotine is a stimulant and can keep you awake. Avoid drinking alcohol late in the evening, because it can cause you to wake in the middle of the night. Do not eat a big meal close to bedtime. If you are hungry, eat a light snack. Do not drink a lot of water close to bedtime, because the need to urinate may wake you up during the night. Do not read or watch TV in bed. Use the bed only for sleeping and sexual activity. What to try  Go to bed at the same time every night, and wake up at the same time every morning. Do not take naps during the day. Keep your bedroom quiet, dark, and cool. Get regular exercise, but not within 3 to 4 hours of your bedtime. .  Sleep on a comfortable pillow and mattress. If watching the clock makes you anxious, turn it facing away from you so you cannot see the time.   If you worry when you lie down, start a worry book. Well before bedtime, write down your worries, and then set the book and your concerns aside. Try meditation or other relaxation techniques before you go to bed. If you cannot fall asleep, get up and go to another room until you feel sleepy. Do something relaxing. Repeat your bedtime routine before you go to bed again. Make your house quiet and calm about an hour before bedtime. Turn down the lights, turn off the TV, log off the computer, and turn down the volume on music. This can help you relax after a busy day. Drowsy Driving  The [address] [place] [address] Road Traffic Safety Administration cites drowsiness as a causing factor in more than 317,744 police reported crashes annually, resulting in 76,000 injuries and 1,500 deaths. Other surveys suggest 55% of people polled have driven while drowsy in the past year, 23% had fallen asleep but not crashed, 3% crashed, and 2% had and accident due to drowsy driving. Who is at risk? Young Drivers: One study of drowsy driving accidents states that 55% of the drivers were under 25 years. Of those, 75% were male. Shift Workers and Travelers: People who work overnight or travel across time zones frequently are at higher risk of experiencing Circadian Rhythm Disorders. They are trying to work and function when their body is programed to sleep. Sleep Deprived: Lack of sleep has a serious impact on your ability to pay attention or focus on a task. Consistently getting less than the average of 8 hours your body needs creates partial or cumulative sleep deprivation. Untreated Sleep Disorders: Sleep Apnea, Narcolepsy, R.L.S., and other sleep disorders (untreated) prevent a person from getting enough restful sleep. This leads to excessive daytime sleepiness and increases the risk for drowsy driving accidents by up to 7 times. Medications / Alcohol: Even over the counter medications can cause drowsiness.  Medications that impair a drivers attention should have a warning label. Alcohol naturally makes you sleepy and on its own can cause accidents. Combined with excessive drowsiness its effects are amplified. Signs of Drowsy Driving:   * You don't remember driving the last few miles   * You may drift out of your oral   * You are unable to focus and your thoughts wander   * You may yawn more often than normal   * You have difficulty keeping your eyes open / nodding off   * Missing traffic signs, speeding, or tailgating  Prevention-   Good sleep hygiene, lifestyle and behavioral choices have the most impact on drowsy driving. There is no substitute for sleep and the average person requires 8 hours nightly. If you find yourself driving drowsy, stop and sleep. Consider the sleep hygiene tips provided during your visit as well. Medication Refill Policy: Refills for all medications require 1 week advance notice. Please have your pharmacy fax a refill request. We are unable to fax, or call in \"controled substance\" medications and you will need to pick these prescriptions up from our office. [employer] Activation    Thank you for requesting access to [employer]. Please follow the instructions below to securely access and download your online medical record. [employer] allows you to send messages to your doctor, view your test results, renew your prescriptions, schedule appointments, and more. How Do I Sign Up? In your internet browser, go to [email or web address]. Click on the First Time User? Click Here link in the Sign In box. You will see the New Member Sign Up page. Enter your [employer] Access Code exactly as it appears below. You will not need to use this code after youve completed the sign-up process. If you do not sign up before the expiration date, you must request a new code. [employer] Access Code:  Activation code not generated  Current [employer] Status: Active (This is the date your [employer] access code will )    Enter the last four digits of your Social Security Number (xxxx) and Date of Birth (mm/dd/yyyy) as indicated and click Submit. You will be taken to the next sign-up page. Create a [employer] pte ID. This will be your [employer] pte login ID and cannot be changed, so think of one that is secure and easy to remember. Create a [employer] pte password. You can change your password at any time. Enter your Password Reset Question and Answer. This can be used at a later time if you forget your password. Enter your e-mail address. You will receive e-mail notification when new information is available in [address] Click Sign Up. You can now view and download portions of your medical record. Click the [employer] link to download a portable copy of your medical information. Additional Information    If you have questions, please call [phone]. Remember, [employer] pte is NOT to be used for urgent needs. For medical emergencies, dial 911.

## 2023-02-08 ENCOUNTER — OFFICE VISIT (OUTPATIENT)
Dept: SURGERY | Age: 38
End: 2023-02-08
Payer: MEDICAID

## 2023-02-08 DIAGNOSIS — F32.A DEPRESSIVE DISORDER: ICD-10-CM

## 2023-02-08 DIAGNOSIS — E66.01 MORBID OBESITY (HCC): ICD-10-CM

## 2023-02-08 DIAGNOSIS — F43.12 CHRONIC POST-TRAUMATIC STRESS DISORDER (PTSD): Primary | ICD-10-CM

## 2023-02-08 DIAGNOSIS — F41.0 PANIC ATTACKS: ICD-10-CM

## 2023-02-08 PROCEDURE — 90834 PSYTX W PT 45 MINUTES: CPT | Performed by: SURGERY

## 2023-02-08 NOTE — PROGRESS NOTES
[address]   Psychosocial Evaluation Part 2 - Mental Health Progress Note    This note will not be viewable in 2housest for the following reason(s). This is a Psychotherapy Note. [facility] Providers Only)    Name: Kirstin Worrell  : 1985  MRN: [id number]  Date of Service: 2023  Type of Service: Individual Therapy (In-Person)  Start Time:  1:54 PM   End Time:   2:35 PM  CPT code: 18261    Encounter Diagnoses     ICD-10-CM ICD-9-CM   1. Chronic post-traumatic stress disorder (PTSD)  F43.12 309.81   2. Depressive disorder  F32. A 311   3. Panic attacks  F41.0 300.01   4. Morbid obesity ([address]  E66.01 278.01        Reason for Visit:  Follow-up to Bariatric Evaluation - to determine appropriateness for bariatric surgery from a psychosocial perspective with requirements and/or recommendations. Following the Bariatric Psychosocial Evaluation - Part 2, a formal copy of the Bariatric Psychosocial Evaluation will be scanned into Epic EHR. Subjective (patient report):  Patient acknowledged taking medication and denied any side-effects. Did not endorse any depression, anxiety, marciano or psychotic symptoms. Objective (factual account of what was observed, mental health status):  Mental Health Status: Patient was dressed properly. No abnormal psychomotor movements observed. Intellectual functioning appeared to be intact. Mood and affect were congruent. Insight was adequate. Judgment was adequate. Speech was normal, clear. Thought process was coherent. Patient did not report suicidal or homicidal ideations, intent or plans. Patient denied self-injury behaviors. Patient denied alcohol and other substance abuse. Patient was oriented to the four spheres: self, place, time and situation. Patient response to intervention was appropriate. Patient progress is adequate. Protective factor: family support.      Assessment:              Risk Assessment (SI, HI, DV, unsafe environment): Denies any SI, HI or DV                          If Yes, identify safety plan:   N/A     Clinical Intervention: This evaluator went over with the patient the approval for the bariatric evaluation and discussed a brief overview of post-surgery success guide (meal planning, exercise fatigue, planning for vacations/holidays/celebrations, social aspects of change after surgery, changes in motivation levels, plateaus, relationship changes, transfer addiction, understand depression, boundary setting, stress management, creating a mantra, and steps for success. Treatment Plan  Goal #1:  Reduce Emotional Eating   Objective #1:  Learning to keep a food diary, learning stress management skills, developing healthy behaviors and identifying difference between physical and emotional hunger. Plans:  Continue to use Cognitive Behavioral Therapy and Dialectical Behavioral Therapy  Continue to work with patient on primary goal - patient is to return for therapy after she has had a few sessions with her dietician to determine behavioral changes needed in preparation for surgery  Continue to see patient weekly for counseling to address behavioral changes - 6 sessions required     Frequency: Bi-weekly appointments as schedule permits     Bariatric Surgery Mental Health Recommendations/Requirements: This evaluator reviewed with the client the following recommendations/requirements prior to surgery: This evaluator discussed with the patient progress and commitment to making changes in patient's lifestyle to lose weight. Mrs. Evin Santo was told she was APPROVED for surgery from a psychosocial perspective.   This evaluator informed patient that she has the following recommendations:  Ongoing bariatric dietician consults to support necessary dietary changes  Ongoing bariatric surgery support groups  REQUIRED - At least six (6) Counseling Sessions prior to surgery to address how to manage her emotions and cope effectively with significant changes after surgery due this patient's history of PTSD, Depression, and Panic Attacks. Patient verbalized understanding. Patient reported that she would like to see this provider for behavioral health clinical services. This evaluator informed her that someone would contact her to schedule an appointment. Patient denied any plan or intent to hurt self or others.         Signature: Vy Cody LPC, NCC, CCTP      Date:  2/8/23

## 2023-02-23 ENCOUNTER — OFFICE VISIT (OUTPATIENT)
Dept: SURGERY | Age: 38
End: 2023-02-23

## 2023-02-23 DIAGNOSIS — E66.01 MORBID OBESITY (HCC): Primary | ICD-10-CM

## 2023-02-23 NOTE — PROGRESS NOTES
[employer] [facility]  Supervised Weight Loss     Date:   2023    Patient's Name: Rosario Hodges  : 1985    [facility]   [facility]      Session: 2 of  6  Surgery: Gastric bypass  Surgeon:  Dr. Anish Ovalle    Height: 64 inches Weight:    329      Lbs. BMI: 57   Pounds Lost since last month: 3 lbs               Pounds Gained since last month: 0    Starting Weight: 340 lbs   Previous Months Weight: 332 lbs  Overall Pounds Lost: 11 lbs  Overall Pounds Gained: 0    Other Pertinent Information: Today's appointment was completed in a virtual setting d/t COVID-19. Surgeon recommending 16 lb wt loss before surgery. Smoking Status:  None  Alcohol Intake: None    I have reviewed with pt the guidelines of the supervised wt loss program.  Pt understands the expectations of some wt loss during the program and that wt gain could delay the process. I have also explained that appointments need to be consecutive and missing an appointment may result in starting over. Pt has received this information in writing. Changes that patient has made since last month include:  going to gym more, drinking more water. Eating Habits and Behaviors  General healthy eating guidelines were also discussed. Pts were instructed that their plate should be made up 1/2 plate coming from non-starchy vegetables, 1/4 coming from lean meat, and 1/4 of their plate coming from carbohydrates, including fruits, starches, or milk. We discussed measuring meals to 1/2 cup total per meal after surgery. Drinking only calorie-free, sugar-free and non-carbonated beverages. We discussed the importance of drinking 64 ounces of fluid per day to prevent dehydration post-operatively. Physical Activity/Exercise  We talked about the importance of increasing daily physical activity and beginning to develop an exercise regimen/routine.  We talked about exercise as being an important part of long term weight loss after surgery. Comments:  During class, I discussed with patient the importance of getting into an exercise routine. Pt is currently walking 5 days a week for 45 min for activity. Pt has been encouraged to continue with current exercise. Behavior Modification    A behavior modification lesson was provided with an emphasis on developing mindful eating behaviors. We talked about how to eat more mindfully and identify emotional eating triggers. Tips and recommendations for how to make these changes were provided. Pt was encouraged to keep a food journal and record what they were taking in daily. Patient's reported eating behaviors: emotional eating, not packing meals when away from home, sometimes limiting snacking, eating meals at the table, eating meals in 10 min or less. Biggest trigger when managing weight is food choices and snacking. Overall Assessment: Nutrition evaluation reveals small lifestyle changes are being made along with weight loss. Goals set and recommendations made. Will continue to assess. Patient-Set Goals:   1. Nutrition - eat more protein; try new recipes; meal plan  2. Exercise - use other exercise machines at the gym other than the treadmill  3.  Behavior -stay motivated and positive; continue to set new goals    German Allen, MARICHUY  2/23/2023

## 2023-02-24 ENCOUNTER — OFFICE VISIT (OUTPATIENT)
Dept: FAMILY MEDICINE CLINIC | Age: 38
End: 2023-02-24
Payer: MEDICAID

## 2023-02-24 VITALS
HEIGHT: 64 IN | RESPIRATION RATE: 16 BRPM | HEART RATE: 71 BPM | WEIGHT: 293 LBS | OXYGEN SATURATION: 97 % | TEMPERATURE: 97.6 F | BODY MASS INDEX: 50.02 KG/M2 | DIASTOLIC BLOOD PRESSURE: 70 MMHG | SYSTOLIC BLOOD PRESSURE: 119 MMHG

## 2023-02-24 DIAGNOSIS — F43.10 PTSD (POST-TRAUMATIC STRESS DISORDER): ICD-10-CM

## 2023-02-24 DIAGNOSIS — Z02.89 ENCOUNTER FOR COMPLETION OF FORM WITH PATIENT: ICD-10-CM

## 2023-02-24 DIAGNOSIS — I10 HYPERTENSION, UNSPECIFIED TYPE: Primary | ICD-10-CM

## 2023-02-24 DIAGNOSIS — E66.01 MORBID OBESITY WITH BODY MASS INDEX OF 50.0-59.9 IN ADULT (HCC): ICD-10-CM

## 2023-02-24 DIAGNOSIS — F41.0 PANIC ATTACKS: ICD-10-CM

## 2023-02-24 NOTE — PROGRESS NOTES
Chief Complaint   Patient presents with    Form Completion         1. \"Have you been to the ER, urgent care clinic since your last visit? Hospitalized since your last visit? \" No    2. \"Have you seen or consulted any other health care providers outside of the [address] [place] [address] since your last visit? \" No     3. For patients aged 39-70: Has the patient had a colonoscopy / FIT/ Cologuard? NA - based on age      If the patient is female:    4. For patients aged 41-77: Has the patient had a mammogram within the past 2 years? Yes - no Care Gap present      5. For patients aged 21-65: Has the patient had a pap smear?  Yes - no Care Gap present         3 most recent PHQ Screens 2/24/2023   PHQ Not Done -   Little interest or pleasure in doing things Several days   Feeling down, depressed, irritable, or hopeless Several days   Total Score PHQ 2 2       Health Maintenance Due   Topic Date Due    DTaP/Tdap/Td series (1 - Tdap) Never done    COVID-19 Vaccine (3 - Booster for Pfizer series) 07/21/2021    Flu Vaccine (1) 08/01/2022

## 2023-03-01 ENCOUNTER — TELEPHONE (OUTPATIENT)
Dept: FAMILY MEDICINE CLINIC | Age: 38
End: 2023-03-01

## 2023-03-01 NOTE — TELEPHONE ENCOUNTER
----- Message from Sophia Wilkerson sent at 2/28/2023  3:34 PM EST -----  Subject: Message to Provider    QUESTIONS  Information for Provider? Pt called to ask if office faxed back over   disability forms. Pt stated her disabilty office stated they faxed papers   over and pt would like to follow up on the status. Pt requesting phone   call back to discuss. ---------------------------------------------------------------------------  --------------  Braulio BASS  [phone]; OK to leave message on voicemail  ---------------------------------------------------------------------------  --------------  SCRIPT ANSWERS  Relationship to Patient?  Self

## 2023-03-07 ENCOUNTER — HOSPITAL ENCOUNTER (OUTPATIENT)
Dept: SLEEP MEDICINE | Age: 38
Discharge: HOME | End: 2023-03-07
Payer: MEDICAID

## 2023-03-07 DIAGNOSIS — G47.33 OSA (OBSTRUCTIVE SLEEP APNEA): ICD-10-CM

## 2023-03-07 DIAGNOSIS — E66.2 HYPOVENTILATION ASSOCIATED WITH OBESITY SYNDROME (HCC): ICD-10-CM

## 2023-03-07 PROCEDURE — 95811 POLYSOM 6/>YRS CPAP 4/> PARM: CPT | Performed by: INTERNAL MEDICINE

## 2023-03-08 ENCOUNTER — DOCUMENTATION ONLY (OUTPATIENT)
Dept: SLEEP MEDICINE | Age: 38
End: 2023-03-08

## 2023-03-08 VITALS
WEIGHT: 293 LBS | BODY MASS INDEX: 50.02 KG/M2 | OXYGEN SATURATION: 99 % | TEMPERATURE: 98.2 F | HEART RATE: 78 BPM | DIASTOLIC BLOOD PRESSURE: 80 MMHG | HEIGHT: 64 IN | SYSTOLIC BLOOD PRESSURE: 136 MMHG

## 2023-03-08 NOTE — PROGRESS NOTES
[address] [place]. [place], [address]  Tel.  [phone]  Fax. [address] [facility] [address]  [place], [address] [facility]  Tel.  [phone]  Fax. [phone] [address] [facility]   Tel.  [phone]  Fax. [phone]     Sleep Study Technical Notes        PRE-Test:  Keya Spicer (: 1985) arrived in the lobby. Patient was greeted and screening questions asked. The patient was taken to the Sleep Center and taken directly to his/her room. Vitals:  Temperature (98.2)   BP (136/80)   SaO2 (99)   Weight per patient (333)  Procedure explained to the patient and questions were answered. The patient expressed understanding of the procedure. Electrodes were applied without incident. The patient was placed in bed and the study was started. Sleep aid taken: Mo      Acquisition Notes:  Lights off:   Respiratory events: Hypopneas, OA, CA  ECG:  some irregularities. Snoring:  mild  PAP titration: Pt met split night criteria. CPAP  Eliminated events:OA, CA  Reduced events:Hypopneas  Events at  final pressure   Leak:  0-15  Desensitization Mask(s) Used: ResMed AirFit N20 medium  Positional therapy with:   Patient slept with positional therapy:  No  Patient slept with head of bed elevated:  No  Supine sleep assessed per physician order:  Yes - Pt is a candidate for bariatric surgery. Patient wore an oral appliance:  No  Other comments:   Patient had caregiver in attendance:  No  Patient watched TV or on phone after lights out for 1 hour  Patient to bathroom 0 times        POST Test:  Patient was awakened. Electrodes were removed. The patient was discharged after answering the Post Questionnaire. Patient stated that she was alert and ok to drive. Equipment and room cleaned per infection control policy.

## 2023-03-09 ENCOUNTER — TELEPHONE (OUTPATIENT)
Dept: SLEEP MEDICINE | Age: 38
End: 2023-03-09

## 2023-03-09 DIAGNOSIS — G47.33 OSA (OBSTRUCTIVE SLEEP APNEA): Primary | ICD-10-CM

## 2023-03-13 ENCOUNTER — TELEPHONE (OUTPATIENT)
Dept: SLEEP MEDICINE | Age: 38
End: 2023-03-13

## 2023-03-13 NOTE — TELEPHONE ENCOUNTER
Patient called in regards to test results from study on 3/8, patient advised that results take 14 business days and we will send results via [address] [place] [address] [address].

## 2023-03-23 ENCOUNTER — OFFICE VISIT (OUTPATIENT)
Dept: SURGERY | Age: 38
End: 2023-03-23

## 2023-03-23 DIAGNOSIS — E66.01 MORBID OBESITY (HCC): Primary | ICD-10-CM

## 2023-03-24 NOTE — PROGRESS NOTES
[facility]  Supervised Weight Loss     Date:   3/23/2023    Patient's Name: Anita Smallwood  : 1985    Insurance:   [employer]      Session: 3 of  6  Surgery: Gastric bypass                  Surgeon:  Dr. Shanice Cavazos     Height: 64 inches       Weight:    330      Lbs. BMI: 57             Pounds Lost since last month: 0               Pounds Gained since last month: 1 lb     Starting Weight: 340 lbs                   Previous Months Weight: 329 lbs  Overall Pounds Lost: 10 lbs             Overall Pounds Gained: 0     Other Pertinent Information: Today's appointment was completed in a virtual setting d/t COVID-19. Surgeon recommending 16 lb wt loss before surgery. Smoking Status:  None  Alcohol Intake: None       I have reviewed with pt the guidelines of the supervised wt loss program.  Pt understands the expectations of some wt loss during the program and that wt gain could delay the process. I have also explained that appointments need to be consecutive and missing an appointment may result in starting over. Pt has received this information in writing. Changes that patient has made since last month include:  limited fast food. Eating Habits and Behaviors  General healthy eating guidelines were discussed. A nutrition lesson was presented on portion control. Patients were instructed implement portion control now using the balanced plate method (1/2 plate non-starchy vegetables, 1/4 plate lean meat, and 1/4 plate whole grains and to include fruit and/or milk at meals or snack). We discussed measuring meals to 1/2 cup total per meal after surgery and appropriate portion progression long term. Patient's current diet habits include: Pt is eating 2-3 meals per day. Snack choices include crackers, fruit. Pt is eating refined carbohydrate foods (bread, pasta, rice, potatoes) 1x day.  Pt is eating sweets/desserts a few times per week. Pt is using baking, grilling, broiling and frying cooking methods. Pt is eating meals prepared outside of the home 1-3x week. Pt is drinking water, caffeinated beverages. Pt reports sometimes emotionally eating. Physical Activity/Exercise  We talked about the importance of increasing daily physical activity and beginning to develop an exercise regimen/routine. We talked about exercise as being an important part of long term weight loss after surgery. Comments:  During class, I discussed with patient the importance of getting into an exercise routine. Pt is currently exercising 3x week for 45 min. Pt has been encouraged to continue with current exercise. Behavior Modification       We talked about how to eat more mindfully. Tips and recommendations for how to make these changes were provided. Pt was encouraged to keep a food journal and record what they were taking in daily. Overall Assessment:  Nutrition evaluation reveals small lifestyle changes are being made. Goals set and recommendations made. Will continue to assess. Patient-Set Goals:   1. Nutrition - practice portion control-use portion control plate; meal prep  2. Exercise - get back into gym more-stay consistent  3.  Behavior -stay motivated-don't give up    Mak Gilbert, RD  3/23/2023

## 2023-03-25 NOTE — TELEPHONE ENCOUNTER
Anita Smallwood is to be contacted by lead sleep technologist regarding results of Sleep Testing which was indicative of an average AHI of 87.1 per hour with an SpO2 ting of 83% and SpO2 of < 88% being 0.50 minutes. Patient met criteria for a PAP titration which was performed successfully. A prescription for a PAP device has been written (see below) and patient will be contacted by office staff regarding follow-up  in 2-3 months after initiation of therapy. Encounter Diagnosis   Name Primary? JOSEPH (obstructive sleep apnea) Yes       Orders Placed This Encounter    AMB SUPPLY ORDER     Diagnosis: Obstructive Sleep Apnea ICD-10 Code (G47.33)    Positive Airway Pressure Therapy: Duration of need: 99 months. APAP Device with Heated Humidifer [id number] / [id number]. Minimum Pressure: 12 cmH2O, Maximum Pressure: 15 cmH2O.     Nasal Pillows Combo Mask (Replace) 2 per month.  Nasal Pillows (Replace) 2 per month.  Full Face Mask 1 every 3 months.  Full Face Mask Cushion 1 per month.  Nasal Cushion (Replace) 2 per month.  Nasal Interface Mask 1 every 3 months.  Headgear 1 every 6 months.  Chinstrap 1 every 6 months.  Tubing 1 every 3 months.  Filter(s) Disposable 2 per month.  Filter(s) Non-Disposable 1 every 6 months. .   [address] for Humidifier (Replace) 1 every 6 months. Perform Mask Fitting per patient preference and comfort - replace as above. Loy Shafer MD, FAASM; NPI: [id number]  Electronically signed. 03/25/23

## 2023-03-27 ENCOUNTER — PATIENT MESSAGE (OUTPATIENT)
Dept: SLEEP MEDICINE | Age: 38
End: 2023-03-27

## 2023-03-27 ENCOUNTER — DOCUMENTATION ONLY (OUTPATIENT)
Dept: SLEEP MEDICINE | Age: 38
End: 2023-03-27

## 2023-04-20 ENCOUNTER — OFFICE VISIT (OUTPATIENT)
Dept: SURGERY | Age: 38
End: 2023-04-20

## 2023-04-20 DIAGNOSIS — E66.01 MORBID OBESITY (HCC): Primary | ICD-10-CM

## 2023-04-20 NOTE — PROGRESS NOTES
[employer] [facility]  Supervised Weight Loss     Date:   2023    Patient's Name: Shahid Garibay  : 1985    Insurance:   [facility]      Session:   Surgery: Gastric bypass                  Surgeon:  Dr. Reji Johnson     Height: 64 inches       Weight:    327     Lbs. BMI: 57             Pounds Lost since last month: 3 lbs               Pounds Gained since last month:0     Starting Weight: 340 lbs                   Previous Months Weight: 330 lbs  Overall Pounds Lost: 13 lbs             Overall Pounds Gained: 0     Other Pertinent Information: Today's appointment was completed in a virtual setting d/t COVID-19. Surgeon recommending 16 lb wt loss before surgery. Smoking Status:  None  Alcohol Intake: None    I have reviewed with pt the guidelines of the supervised wt loss program.  Pt understands the expectations of some wt loss during the program and that wt gain could delay the process. I have also explained that appointments need to be consecutive and missing an appointment may result in starting over. Pt has received this information in writing. Changes that patient has made since last month include:  going to gym more. Eating Habits and Behaviors  General healthy eating guidelines were also discussed. Pts were instructed that their plate should be made up 1/2 plate coming from non-starchy vegetables, 1/4 coming from lean meat, and 1/4 of their plate coming from carbohydrates, including fruits, starches, or milk. We discussed measuring meals to 1/2 cup total per meal after surgery. Drinking only calorie-free, sugar-free and non-carbonated beverages. We discussed the importance of drinking 64 ounces of fluid per day to prevent dehydration post-operatively. Patient's current diet habits include: Pt is eating 2-3 meals per day. Snack choices include crackers, fruit, nuts.  Pt is eating refined carbohydrate foods (bread, pasta, rice, potatoes) 2-3x week. Pt is eating sweets/desserts 2-3x week. Pt is using baking, grilling, broiling and frying cooking methods. Pt is eating meals prepared outside of the home 1-3x week. Pt is drinking water. Pt reports sometimes emotionally eating. Physical Activity/Exercise  An exercise presentation was provided including information about exercise programs available both before and after surgery. We talked about the importance of increasing daily physical activity and beginning to develop an exercise regimen/routine. We talked about exercise as being an important part of long term weight loss after surgery. Comments:  During class, I discussed with patient the importance of getting into an exercise routine. Pt is currently walking 3-5x week for 45 min for activity. Pt has been encouraged to continue with current exercise. Behavior Modification       We talked about how to eat more mindfully. Tips and recommendations for how to make these changes were provided. Pt was encouraged to keep a food journal and record what they were taking in daily. Overall Assessment: Nutrition evaluation reveals small lifestyle changes are being made along with weight loss. Goals set and recommendations made. Will continue to assess. Patient-Set Goals:   1. Nutrition - eat more veggies- meal prep-check out farmers markets  2. Exercise - increase exercise to 1 hour  3.  Behavior - stay on track-limit snacks and choose healthy ones    Yamileth Echevarria, MARICHUY  4/20/2023

## 2023-05-17 ENCOUNTER — OFFICE VISIT (OUTPATIENT)
Age: 38
End: 2023-05-17
Payer: MEDICAID

## 2023-05-17 VITALS
WEIGHT: 293 LBS | RESPIRATION RATE: 16 BRPM | SYSTOLIC BLOOD PRESSURE: 135 MMHG | HEIGHT: 64 IN | HEART RATE: 85 BPM | DIASTOLIC BLOOD PRESSURE: 67 MMHG | TEMPERATURE: 97.9 F | BODY MASS INDEX: 50.02 KG/M2 | OXYGEN SATURATION: 98 %

## 2023-05-17 DIAGNOSIS — E78.2 MIXED HYPERLIPIDEMIA: ICD-10-CM

## 2023-05-17 DIAGNOSIS — Z23 ENCOUNTER FOR VACCINATION: ICD-10-CM

## 2023-05-17 DIAGNOSIS — F41.9 ANXIETY AND DEPRESSION: ICD-10-CM

## 2023-05-17 DIAGNOSIS — E66.01 OBESITY, MORBID (HCC): ICD-10-CM

## 2023-05-17 DIAGNOSIS — Z01.84 IMMUNITY TO VARICELLA DETERMINED BY SEROLOGIC TEST: ICD-10-CM

## 2023-05-17 DIAGNOSIS — R73.01 IMPAIRED FASTING GLUCOSE: ICD-10-CM

## 2023-05-17 DIAGNOSIS — F32.A ANXIETY AND DEPRESSION: ICD-10-CM

## 2023-05-17 DIAGNOSIS — Z11.4 ENCOUNTER FOR HIV (HUMAN IMMUNODEFICIENCY VIRUS) TEST: ICD-10-CM

## 2023-05-17 DIAGNOSIS — I10 PRIMARY HYPERTENSION: ICD-10-CM

## 2023-05-17 DIAGNOSIS — Z02.89 ENCOUNTER FOR COMPLETION OF FORM WITH PATIENT: ICD-10-CM

## 2023-05-17 DIAGNOSIS — Z76.89 ENCOUNTER TO ESTABLISH CARE: Primary | ICD-10-CM

## 2023-05-17 DIAGNOSIS — J06.9 VIRAL URI: ICD-10-CM

## 2023-05-17 PROCEDURE — 96372 THER/PROPH/DIAG INJ SC/IM: CPT | Performed by: NURSE PRACTITIONER

## 2023-05-17 PROCEDURE — 90715 TDAP VACCINE 7 YRS/> IM: CPT | Performed by: NURSE PRACTITIONER

## 2023-05-17 PROCEDURE — 99214 OFFICE O/P EST MOD 30 MIN: CPT | Performed by: NURSE PRACTITIONER

## 2023-05-17 PROCEDURE — PBSHW PR THERAPEUTIC PROPHYLACTIC/DX INJECTION SUBQ/IM: Performed by: NURSE PRACTITIONER

## 2023-05-17 PROCEDURE — 3075F SYST BP GE 130 - 139MM HG: CPT | Performed by: NURSE PRACTITIONER

## 2023-05-17 PROCEDURE — PBSHW TDAP, BOOSTRIX, (AGE 10 YRS+), IM: Performed by: NURSE PRACTITIONER

## 2023-05-17 PROCEDURE — 99080 SPECIAL REPORTS OR FORMS: CPT | Performed by: NURSE PRACTITIONER

## 2023-05-17 PROCEDURE — 3078F DIAST BP <80 MM HG: CPT | Performed by: NURSE PRACTITIONER

## 2023-05-17 RX ORDER — METOPROLOL SUCCINATE 25 MG/1
25 TABLET, EXTENDED RELEASE ORAL DAILY
Qty: 90 TABLET | Refills: 3 | Status: SHIPPED | OUTPATIENT
Start: 2023-05-17

## 2023-05-17 RX ORDER — BENZONATATE 200 MG/1
200 CAPSULE ORAL 3 TIMES DAILY PRN
Qty: 21 CAPSULE | Refills: 0 | Status: SHIPPED | OUTPATIENT
Start: 2023-05-17 | End: 2023-05-24

## 2023-05-17 RX ORDER — HYDROXYZINE 50 MG/1
50 TABLET, FILM COATED ORAL DAILY PRN
Qty: 90 TABLET | Refills: 0 | Status: SHIPPED | OUTPATIENT
Start: 2023-05-17

## 2023-05-17 RX ORDER — ATORVASTATIN CALCIUM 10 MG/1
10 TABLET, FILM COATED ORAL DAILY
Qty: 90 TABLET | Refills: 2 | Status: SHIPPED | OUTPATIENT
Start: 2023-05-17

## 2023-05-17 RX ORDER — SERTRALINE HYDROCHLORIDE 100 MG/1
200 TABLET, FILM COATED ORAL DAILY
Qty: 180 TABLET | Refills: 3 | Status: SHIPPED | OUTPATIENT
Start: 2023-05-17 | End: 2023-08-15

## 2023-05-17 RX ORDER — AMLODIPINE BESYLATE 10 MG/1
10 TABLET ORAL DAILY
Qty: 90 TABLET | Refills: 1 | Status: SHIPPED | OUTPATIENT
Start: 2023-05-17

## 2023-05-17 SDOH — ECONOMIC STABILITY: FOOD INSECURITY: WITHIN THE PAST 12 MONTHS, YOU WORRIED THAT YOUR FOOD WOULD RUN OUT BEFORE YOU GOT MONEY TO BUY MORE.: NEVER TRUE

## 2023-05-17 SDOH — ECONOMIC STABILITY: FOOD INSECURITY: WITHIN THE PAST 12 MONTHS, THE FOOD YOU BOUGHT JUST DIDN'T LAST AND YOU DIDN'T HAVE MONEY TO GET MORE.: NEVER TRUE

## 2023-05-17 SDOH — ECONOMIC STABILITY: INCOME INSECURITY: HOW HARD IS IT FOR YOU TO PAY FOR THE VERY BASICS LIKE FOOD, HOUSING, MEDICAL CARE, AND HEATING?: NOT HARD AT ALL

## 2023-05-17 SDOH — ECONOMIC STABILITY: HOUSING INSECURITY
IN THE LAST 12 MONTHS, WAS THERE A TIME WHEN YOU DID NOT HAVE A STEADY PLACE TO SLEEP OR SLEPT IN A SHELTER (INCLUDING NOW)?: NO

## 2023-05-17 SDOH — ECONOMIC STABILITY: TRANSPORTATION INSECURITY
IN THE PAST 12 MONTHS, HAS LACK OF TRANSPORTATION KEPT YOU FROM MEETINGS, WORK, OR FROM GETTING THINGS NEEDED FOR DAILY LIVING?: NO

## 2023-05-17 ASSESSMENT — ENCOUNTER SYMPTOMS
GASTROINTESTINAL NEGATIVE: 1
SORE THROAT: 1
CHEST TIGHTNESS: 0
COUGH: 1

## 2023-05-17 NOTE — PROGRESS NOTES
Chief Complaint   Patient presents with    Hypertension    Establish Care         1. \"Have you been to the ER, urgent care clinic since your last visit? Hospitalized since your last visit? \" Yes 5/16/23 [facility] for URI    2. \"Have you seen or consulted any other health care providers outside of the [address] [place] [address] since your last visit? \" No     3. For patients over 39: Has the patient had a colorectal cancer screening? N/A     If the patient is female:    4. For patients over 40: Has the patient had a mammogram? N/A    5. For patients over 21: Has the patient had a pap smear?  Yes [facility] [address]     No data recorded    Health Maintenance Due   Topic Date Due    Varicella vaccine (1 of 2 - 2-dose childhood series) Never done    HIV screen  Never done    Cervical cancer screen  Never done    COVID-19 Vaccine (3 - Booster for Pfizer series) 07/21/2021    Diabetes screen  Never done

## 2023-05-17 NOTE — PROGRESS NOTES
[facility] Note     Paulie Charles (: 1985) is a 40 y.o. female, established patient, here for evaluation of the following chief complaint(s):  Hypertension and Establish Care       ASSESSMENT/PLAN:  1. Encounter to establish care    2. Obesity, morbid ([facility] [address]  -Working with bariatric surgery team for planned scopic Hector-en-Y gastric bypass surgery    3. Primary hypertension  -     Comprehensive Metabolic Panel; Future  -     amLODIPine (NORVASC) 10 MG tablet; Take 1 tablet by mouth daily ceived the following from [facility]: Outside name: amLODIPine (NORVASC) 5 mg tablet, Disp-90 tablet, R-1Normal  -     metoprolol succinate (TOPROL XL) 25 MG extended release tablet; Take 1 tablet by mouth daily, Disp-90 tablet, R-3Normal    4. Mixed hyperlipidemia  -     Lipid Panel; Future  -     atorvastatin (LIPITOR) 10 MG tablet; Take 1 tablet by mouth daily, Disp-90 tablet, R-2Normal    5. Encounter for vaccination  -     Tdap, [address], (age 8 yrs+), IM  -     TX THERAPEUTIC PROPHYLACTIC/DX INJECTION SUBQ/IM    6. Immunity to varicella determined by serologic test  -     Varicella Zoster Antibody, IgG; Future    7. Encounter for HIV (human immunodeficiency virus) test  -     HIV 1/2 Ag/Ab, 4TH Generation,W Rflx Confirm; Future    8. Impaired fasting glucose  -     Hemoglobin A1C; Future    9. Viral URI  -     benzonatate (TESSALON) 200 MG capsule; Take 1 capsule by mouth 3 times daily as needed for Cough, Disp-21 capsule, R-0Normal  -Supportive treatments included: Saline nasal spray, trial of Coricidin HBP, increase oral hydration, add humidifier as needed, Tylenol as needed for fever or muscle aches  -Counseling provided as to when to return for reevaluation should symptoms persist or become worse    10. Anxiety and depression  -     hydrOXYzine HCl (ATARAX) 50 MG tablet;  Take 1 tablet by mouth daily as needed for Anxiety, Disp-90 tablet, R-0Normal  -     sertraline

## 2023-05-18 LAB
ALBUMIN SERPL-MCNC: 4.2 G/DL (ref 3.8–4.8)
ALBUMIN/GLOB SERPL: 1.1 {RATIO} (ref 1.2–2.2)
ALP SERPL-CCNC: 81 IU/L (ref 44–121)
ALT SERPL-CCNC: 25 IU/L (ref 0–32)
AST SERPL-CCNC: 21 IU/L (ref 0–40)
BILIRUB SERPL-MCNC: 0.4 MG/DL (ref 0–1.2)
BUN SERPL-MCNC: 7 MG/DL (ref 6–20)
BUN/CREAT SERPL: 9 (ref 9–23)
CALCIUM SERPL-MCNC: 9 MG/DL (ref 8.7–10.2)
CHLORIDE SERPL-SCNC: 104 MMOL/L (ref 96–106)
CHOLEST SERPL-MCNC: 157 MG/DL (ref 100–199)
CO2 SERPL-SCNC: 23 MMOL/L (ref 20–29)
CREAT SERPL-MCNC: 0.77 MG/DL (ref 0.57–1)
EGFRCR SERPLBLD CKD-EPI 2021: 102 ML/MIN/1.73
GLOBULIN SER CALC-MCNC: 3.7 G/DL (ref 1.5–4.5)
GLUCOSE SERPL-MCNC: 90 MG/DL (ref 70–99)
HBA1C MFR BLD: 5.5 % (ref 4.8–5.6)
HDLC SERPL-MCNC: 44 MG/DL
HIV 1+2 AB+HIV1 P24 AG SERPL QL IA: NON REACTIVE
IMP & REVIEW OF LAB RESULTS: NORMAL
LDLC SERPL CALC-MCNC: 96 MG/DL (ref 0–99)
POTASSIUM SERPL-SCNC: 4 MMOL/L (ref 3.5–5.2)
PROT SERPL-MCNC: 7.9 G/DL (ref 6–8.5)
SODIUM SERPL-SCNC: 141 MMOL/L (ref 134–144)
TRIGL SERPL-MCNC: 91 MG/DL (ref 0–149)
VLDLC SERPL CALC-MCNC: 17 MG/DL (ref 5–40)
VZV IGG SER IA-ACNC: 411 INDEX

## 2023-05-23 ENCOUNTER — OFFICE VISIT (OUTPATIENT)
Age: 38
End: 2023-05-23

## 2023-05-23 DIAGNOSIS — E66.01 MORBID OBESITY (HCC): Primary | ICD-10-CM

## 2023-05-23 NOTE — PROGRESS NOTES
[facility]  Supervised Weight Loss     Date:   2023    Patient's Name: Christine Winters  : 1985    [facility]   [facility] [facility]      Session:   Surgery: Gastric bypass                  Surgeon:  Dr. Alexei Lane     Height: 64 inches       Weight:    327     Lbs. BMI: 57             Pounds Lost since last month: 0              Pounds Gained since last month:0     Starting Weight: 340 lbs                   Previous Months Weight: 330 lbs  Overall Pounds Lost: 13 lbs             Overall Pounds Gained: 0     Other Pertinent Information: Today's appointment was completed in a virtual setting d/t COVID-19. Surgeon recommending 16 lb wt loss before surgery. Smoking Status:  None  Alcohol Intake: None    I have reviewed with pt the guidelines of the supervised wt loss program.  Pt understands the expectations of some wt loss during the program and that wt gain could delay the process. I have also explained that appointments need to be consecutive and missing an appointment may result in starting over. Pt has received this information in writing. Changes that patient has made since last month include:  start taking one a day vitamins. Eating Habits and Behaviors  A nutrition lesson was presented on label reading with specific guidelines provided for limiting added sugars. This information will help increase healthy food choices, promote weight loss and prevent dumping syndrome after gastric bypass. We also reviewed the general nutrition guidelines for bariatric surgery. Patient's current diet habits include: Pt is eating 3 meals per day. Snack choices include fruit. Pt is eating refined carbohydrate foods (bread, pasta, rice, potatoes) 1-2x week. Pt is eating sweets/desserts 1-2x week. Pt is using healthy and unhealthy cooking methods.  Pt is eating meals prepared outside of the home 1x

## 2023-05-28 ASSESSMENT — SLEEP AND FATIGUE QUESTIONNAIRES
HOW LIKELY ARE YOU TO NOD OFF OR FALL ASLEEP IN A CAR, WHILE STOPPED FOR A FEW MINUTES IN TRAFFIC: WOULD NEVER DOZE
HOW LIKELY ARE YOU TO NOD OFF OR FALL ASLEEP WHILE WATCHING TV: WOULD NEVER DOZE
DO YOU HAVE DIFFICULTY BEING AS ACTIVE AS YOU WANT TO BE IN THE EVENING BECAUSE YOU ARE SLEEPY OR TIRED: NO
HOW LIKELY ARE YOU TO NOD OFF OR FALL ASLEEP WHILE SITTING AND TALKING TO SOMEONE: 0
HOW LIKELY ARE YOU TO NOD OFF OR FALL ASLEEP WHILE LYING DOWN TO REST IN THE AFTERNOON WHEN CIRCUMSTANCES PERMIT: SLIGHT CHANCE OF DOZING
HOW LIKELY ARE YOU TO NOD OFF OR FALL ASLEEP WHILE SITTING INACTIVE IN A PUBLIC PLACE: 0
HOW LIKELY ARE YOU TO NOD OFF OR FALL ASLEEP WHEN YOU ARE A PASSENGER IN A CAR FOR AN HOUR WITHOUT A BREAK: 1
ESS TOTAL SCORE: 3
HOW LIKELY ARE YOU TO NOD OFF OR FALL ASLEEP WHILE WATCHING TV: 0
HOW LIKELY ARE YOU TO NOD OFF OR FALL ASLEEP WHILE SITTING INACTIVE IN A PUBLIC PLACE: WOULD NEVER DOZE
HOW LIKELY ARE YOU TO NOD OFF OR FALL ASLEEP WHILE SITTING AND READING: WOULD NEVER DOZE
HAS YOUR RELATIONSHIP WITH FAMILY, FRIENDS OR WORK COLLEAGUES BEEN AFFECTED BECAUSE YOU ARE SLEEPY OR TIRED: NO
DO YOU HAVE DIFFICULTY BEING AS ACTIVE AS YOU WANT TO BE IN THE MORNING BECAUSE YOU ARE SLEEPY OR TIRED: NO
HOW LIKELY ARE YOU TO NOD OFF OR FALL ASLEEP WHILE SITTING QUIETLY AFTER LUNCH WITHOUT ALCOHOL: 1
HOW LIKELY ARE YOU TO NOD OFF OR FALL ASLEEP WHILE SITTING AND TALKING TO SOMEONE: WOULD NEVER DOZE
DO YOU HAVE DIFFICULTY CONCENTRATING ON THE THINGS YOU DO BECAUSE YOU ARE SLEEPY OR TIRED: NO
HAS YOUR MOOD BEEN AFFECTED BECAUSE YOU ARE SLEEPY OR TIRED: NO
HOW LIKELY ARE YOU TO NOD OFF OR FALL ASLEEP WHEN YOU ARE A PASSENGER IN A CAR FOR AN HOUR WITHOUT A BREAK: SLIGHT CHANCE OF DOZING
DO YOU HAVE DIFFICULTY WATCHING A MOVIE OR VIDEO BECAUSE YOU BECOME SLEEPY OR TIRED: NO
DO YOU GENERALLY HAVE DIFFICULTY REMEMBERING THINGS BECAUSE YOU ARE SLEEPY OR TIRED: NO
DO YOU HAVE DIFFICULTY VISITING YOUR FAMILY OR FRIENDS IN THEIR HOME BECAUSE YOU BECOME SLEEPY OR TIRED: NO
HOW LIKELY ARE YOU TO NOD OFF OR FALL ASLEEP WHILE SITTING QUIETLY AFTER LUNCH WITHOUT ALCOHOL: SLIGHT CHANCE OF DOZING
DO YOU HAVE DIFFICULTY OPERATING A MOTOR VEHICLE FOR SHORT DISTANCES (LESS THAN 100 MILES) BECAUSE YOU BECOME SLEEPY: NO
FOSQ SCORE: 20
HOW LIKELY ARE YOU TO NOD OFF OR FALL ASLEEP WHILE SITTING AND READING: 0
DO YOU HAVE DIFFICULTY OPERATING A MOTOR VEHICLE FOR LONG DISTANCES (GREATER THAN 100 MILES) BECAUSE YOU BECOME SLEEPY: NO
HOW LIKELY ARE YOU TO NOD OFF OR FALL ASLEEP IN A CAR, WHILE STOPPED FOR A FEW MINUTES IN TRAFFIC: 0
HOW LIKELY ARE YOU TO NOD OFF OR FALL ASLEEP WHILE LYING DOWN TO REST IN THE AFTERNOON WHEN CIRCUMSTANCES PERMIT: 1

## 2023-05-31 ENCOUNTER — OFFICE VISIT (OUTPATIENT)
Age: 38
End: 2023-05-31
Payer: MEDICAID

## 2023-05-31 ENCOUNTER — CLINICAL DOCUMENTATION (OUTPATIENT)
Age: 38
End: 2023-05-31

## 2023-05-31 VITALS
HEIGHT: 64 IN | TEMPERATURE: 98.2 F | WEIGHT: 293 LBS | BODY MASS INDEX: 50.02 KG/M2 | HEART RATE: 87 BPM | OXYGEN SATURATION: 95 % | SYSTOLIC BLOOD PRESSURE: 137 MMHG | DIASTOLIC BLOOD PRESSURE: 75 MMHG

## 2023-05-31 DIAGNOSIS — I10 ESSENTIAL (PRIMARY) HYPERTENSION: ICD-10-CM

## 2023-05-31 DIAGNOSIS — G47.33 OBSTRUCTIVE SLEEP APNEA (ADULT) (PEDIATRIC): Primary | ICD-10-CM

## 2023-05-31 PROCEDURE — 3075F SYST BP GE 130 - 139MM HG: CPT | Performed by: NURSE PRACTITIONER

## 2023-05-31 PROCEDURE — 99213 OFFICE O/P EST LOW 20 MIN: CPT | Performed by: NURSE PRACTITIONER

## 2023-05-31 PROCEDURE — 3078F DIAST BP <80 MM HG: CPT | Performed by: NURSE PRACTITIONER

## 2023-05-31 NOTE — PATIENT INSTRUCTIONS
[address] [facility] [place]. [place], [address]  Tel.  [phone]  Fax. 100 [facility] 60  [address] [facility] [address], [address]  Tel.  [phone]  Fax. [phone] [address]  Tel.  [phone]  Fax. [phone]     Learning About CPAP for Sleep Apnea  What is CPAP? CPAP is a small machine that you use at home every night while you sleep. It increases air pressure in your throat to keep your airway open. When you have sleep apnea, this can help you sleep better so you feel much better. CPAP stands for \"continuous positive airway pressure. \"  The CPAP machine will have one of the following:  A mask that covers your nose and mouth  Prongs that fit into your nose  A mask that covers your nose only, the most common type. This type is called NCPAP. The N stands for \"nasal.\"  Why is it done? CPAP is usually the best treatment for obstructive sleep apnea. It is the first treatment choice and the most widely used. Your doctor may suggest CPAP if you have: Moderate to severe sleep apnea. Sleep apnea and coronary artery disease (CAD) or heart failure. How does it help? CPAP can help you have more normal sleep, so you feel less sleepy and more alert during the daytime. CPAP may help keep heart failure or other heart problems from getting worse. NCPAP may help lower your blood pressure. If you use CPAP, your bed partner may also sleep better because you are not snoring or restless. What are the side effects? Some people who use CPAP have:  A dry or stuffy nose and a sore throat. Irritated skin on the face. Sore eyes. Bloating. If you have any of these problems, work with your doctor to fix them. Here are some things you can try:  Be sure the mask or nasal prongs fit well. See if your doctor can adjust the pressure of your CPAP. If your nose is dry, try a humidifier.   If your nose is runny or stuffy, try decongestant medicine or a steroid

## 2023-06-22 ENCOUNTER — OFFICE VISIT (OUTPATIENT)
Age: 38
End: 2023-06-22

## 2023-06-22 DIAGNOSIS — E66.01 MORBID OBESITY (HCC): Primary | ICD-10-CM

## 2023-06-22 NOTE — PROGRESS NOTES
[employer] [facility]  Supervised Weight Loss     Date:   2023    Patient's Name: Mary Hernandez  : 1985    [facility]      Session:   Surgery: Gastric bypass                  Surgeon:  Dr. Brad Serrano     Height: 64 inches       Weight:    328     Lbs. BMI: 57             Pounds Lost since last month: 0              Pounds Gained since last month: 1 lb     Starting Weight: 340 lbs                   Previous Months Weight: 327 lbs  Overall Pounds Lost: 12 lbs             Overall Pounds Gained: 0     Other Pertinent Information: Today's appointment was completed in a virtual setting d/t COVID-19. Surgeon recommending 16 lb wt loss before surgery. Smoking Status:  None  Alcohol Intake: None       I have reviewed with pt the guidelines of the supervised wt loss program.  Pt understands the expectations of some wt loss during the program and that wt gain could delay the process. I have also explained that classes need to be consecutive. Missing a class may result in starting over. Pt has received this information in writing. Changes that patient has made since last month include:  trying new protein shakes, eating meals without drinking anything. Eating Habits and Behaviors  General healthy eating guidelines were discussed. A nutrition lesson specific to the importance of protein intake after surgery was provided. We discussed food sources of protein, protein supplements and multiple reasons as to why protein is important after bariatric surgery. Pts were instructed to focus on including protein at every meal and practice eating protein first at the meal. Pts were encouraged to sample a protein shake for tolerance. Patients were also instructed to use the balanced plate method for help with portion control and general healthy eating prior to surgery.  We discussed measuring meals to 1/2 cup

## 2023-06-26 ENCOUNTER — OFFICE VISIT (OUTPATIENT)
Age: 38
End: 2023-06-26
Payer: MEDICAID

## 2023-06-26 VITALS
TEMPERATURE: 98.5 F | OXYGEN SATURATION: 98 % | RESPIRATION RATE: 20 BRPM | WEIGHT: 293 LBS | SYSTOLIC BLOOD PRESSURE: 128 MMHG | DIASTOLIC BLOOD PRESSURE: 81 MMHG | BODY MASS INDEX: 50.02 KG/M2 | HEART RATE: 84 BPM | HEIGHT: 64 IN

## 2023-06-26 DIAGNOSIS — I10 PRIMARY HYPERTENSION: ICD-10-CM

## 2023-06-26 DIAGNOSIS — E66.01 OBESITY, MORBID (HCC): Primary | ICD-10-CM

## 2023-06-26 DIAGNOSIS — E78.2 MIXED HYPERLIPIDEMIA: ICD-10-CM

## 2023-06-26 DIAGNOSIS — K21.9 GASTROESOPHAGEAL REFLUX DISEASE WITHOUT ESOPHAGITIS: ICD-10-CM

## 2023-06-26 PROCEDURE — 99213 OFFICE O/P EST LOW 20 MIN: CPT | Performed by: SURGERY

## 2023-06-26 PROCEDURE — 3079F DIAST BP 80-89 MM HG: CPT | Performed by: SURGERY

## 2023-06-26 PROCEDURE — 3074F SYST BP LT 130 MM HG: CPT | Performed by: SURGERY

## 2023-06-26 ASSESSMENT — PATIENT HEALTH QUESTIONNAIRE - PHQ9
2. FEELING DOWN, DEPRESSED OR HOPELESS: 0
SUM OF ALL RESPONSES TO PHQ9 QUESTIONS 1 & 2: 0
SUM OF ALL RESPONSES TO PHQ QUESTIONS 1-9: 0
SUM OF ALL RESPONSES TO PHQ QUESTIONS 1-9: 0
1. LITTLE INTEREST OR PLEASURE IN DOING THINGS: 0
SUM OF ALL RESPONSES TO PHQ QUESTIONS 1-9: 0
SUM OF ALL RESPONSES TO PHQ QUESTIONS 1-9: 0

## 2023-06-27 ENCOUNTER — CLINICAL DOCUMENTATION (OUTPATIENT)
Age: 38
End: 2023-06-27

## 2023-06-28 ENCOUNTER — TELEPHONE (OUTPATIENT)
Age: 38
End: 2023-06-28

## 2023-07-10 ENCOUNTER — HOSPITAL ENCOUNTER (OUTPATIENT)
Age: 38
Discharge: HOME | End: 2023-07-13
Payer: MEDICAID

## 2023-07-10 ENCOUNTER — HOSPITAL ENCOUNTER (OUTPATIENT)
Facility: HOSPITAL | Age: 38
Discharge: HOME | End: 2023-07-13
Payer: MEDICAID

## 2023-07-10 VITALS
TEMPERATURE: 98.1 F | HEART RATE: 66 BPM | WEIGHT: 293 LBS | BODY MASS INDEX: 50.02 KG/M2 | OXYGEN SATURATION: 96 % | RESPIRATION RATE: 18 BRPM | HEIGHT: 64 IN | SYSTOLIC BLOOD PRESSURE: 179 MMHG | DIASTOLIC BLOOD PRESSURE: 74 MMHG

## 2023-07-10 LAB
ALBUMIN SERPL-MCNC: 3.6 G/DL (ref 3.5–5)
ALBUMIN/GLOB SERPL: 0.8 (ref 1.1–2.2)
ALP SERPL-CCNC: 84 U/L (ref 45–117)
ALT SERPL-CCNC: 33 U/L (ref 12–78)
ANION GAP SERPL CALC-SCNC: 4 MMOL/L (ref 5–15)
APPEARANCE UR: CLEAR
AST SERPL-CCNC: 19 U/L (ref 15–37)
BACTERIA URNS QL MICRO: NEGATIVE /HPF
BASOPHILS # BLD: 0 K/UL (ref 0–0.1)
BASOPHILS NFR BLD: 1 % (ref 0–1)
BILIRUB SERPL-MCNC: 0.5 MG/DL (ref 0.2–1)
BILIRUB UR QL: NEGATIVE
BUN SERPL-MCNC: 9 MG/DL (ref 6–20)
BUN/CREAT SERPL: 15 (ref 12–20)
CALCIUM SERPL-MCNC: 8.9 MG/DL (ref 8.5–10.1)
CHLORIDE SERPL-SCNC: 107 MMOL/L (ref 97–108)
CO2 SERPL-SCNC: 29 MMOL/L (ref 21–32)
COLOR UR: NORMAL
CREAT SERPL-MCNC: 0.6 MG/DL (ref 0.55–1.02)
DIFFERENTIAL METHOD BLD: ABNORMAL
EKG ATRIAL RATE: 64 BPM
EKG DIAGNOSIS: NORMAL
EKG P AXIS: 26 DEGREES
EKG P-R INTERVAL: 184 MS
EKG Q-T INTERVAL: 382 MS
EKG QRS DURATION: 82 MS
EKG QTC CALCULATION (BAZETT): 394 MS
EKG R AXIS: 25 DEGREES
EKG T AXIS: 10 DEGREES
EKG VENTRICULAR RATE: 64 BPM
EOSINOPHIL # BLD: 0.1 K/UL (ref 0–0.4)
EOSINOPHIL NFR BLD: 1 % (ref 0–7)
EPITH CASTS URNS QL MICRO: NORMAL /LPF
ERYTHROCYTE [DISTWIDTH] IN BLOOD BY AUTOMATED COUNT: 13.5 % (ref 11.5–14.5)
GLOBULIN SER CALC-MCNC: 4.4 G/DL (ref 2–4)
GLUCOSE SERPL-MCNC: 92 MG/DL (ref 65–100)
GLUCOSE UR STRIP.AUTO-MCNC: NEGATIVE MG/DL
HCT VFR BLD AUTO: 39 % (ref 35–47)
HGB BLD-MCNC: 12.6 G/DL (ref 11.5–16)
HGB UR QL STRIP: NEGATIVE
HYALINE CASTS URNS QL MICRO: NORMAL /LPF (ref 0–5)
IMM GRANULOCYTES # BLD AUTO: 0 K/UL (ref 0–0.04)
IMM GRANULOCYTES NFR BLD AUTO: 1 % (ref 0–0.5)
KETONES UR QL STRIP.AUTO: NEGATIVE MG/DL
LEUKOCYTE ESTERASE UR QL STRIP.AUTO: NEGATIVE
LYMPHOCYTES # BLD: 2.1 K/UL (ref 0.8–3.5)
LYMPHOCYTES NFR BLD: 33 % (ref 12–49)
MAGNESIUM SERPL-MCNC: 2 MG/DL (ref 1.6–2.4)
MCH RBC QN AUTO: 29.4 PG (ref 26–34)
MCHC RBC AUTO-ENTMCNC: 32.3 G/DL (ref 30–36.5)
MCV RBC AUTO: 90.9 FL (ref 80–99)
MONOCYTES # BLD: 0.4 K/UL (ref 0–1)
MONOCYTES NFR BLD: 6 % (ref 5–13)
NEUTS SEG # BLD: 3.7 K/UL (ref 1.8–8)
NEUTS SEG NFR BLD: 58 % (ref 32–75)
NITRITE UR QL STRIP.AUTO: NEGATIVE
NRBC # BLD: 0 K/UL (ref 0–0.01)
NRBC BLD-RTO: 0 PER 100 WBC
PH UR STRIP: 7 (ref 5–8)
PLATELET # BLD AUTO: 241 K/UL (ref 150–400)
PMV BLD AUTO: 10.8 FL (ref 8.9–12.9)
POTASSIUM SERPL-SCNC: 3.9 MMOL/L (ref 3.5–5.1)
PROT SERPL-MCNC: 8 G/DL (ref 6.4–8.2)
PROT UR STRIP-MCNC: NEGATIVE MG/DL
RBC # BLD AUTO: 4.29 M/UL (ref 3.8–5.2)
RBC #/AREA URNS HPF: NORMAL /HPF (ref 0–5)
SODIUM SERPL-SCNC: 140 MMOL/L (ref 136–145)
SP GR UR REFRACTOMETRY: 1.02 (ref 1–1.03)
T4 FREE SERPL-MCNC: 0.9 NG/DL (ref 0.8–1.5)
TSH SERPL DL<=0.05 MIU/L-ACNC: 1.4 UIU/ML (ref 0.36–3.74)
URINE CULTURE IF INDICATED: NORMAL
UROBILINOGEN UR QL STRIP.AUTO: 0.2 EU/DL (ref 0.2–1)
WBC # BLD AUTO: 6.4 K/UL (ref 3.6–11)
WBC URNS QL MICRO: NORMAL /HPF (ref 0–4)

## 2023-07-10 PROCEDURE — 83735 ASSAY OF MAGNESIUM: CPT

## 2023-07-10 PROCEDURE — 71046 X-RAY EXAM CHEST 2 VIEWS: CPT

## 2023-07-10 PROCEDURE — 85025 COMPLETE CBC W/AUTO DIFF WBC: CPT

## 2023-07-10 PROCEDURE — 36415 COLL VENOUS BLD VENIPUNCTURE: CPT

## 2023-07-10 PROCEDURE — 80053 COMPREHEN METABOLIC PANEL: CPT

## 2023-07-10 PROCEDURE — 84443 ASSAY THYROID STIM HORMONE: CPT

## 2023-07-10 PROCEDURE — 84439 ASSAY OF FREE THYROXINE: CPT

## 2023-07-10 PROCEDURE — 81001 URINALYSIS AUTO W/SCOPE: CPT

## 2023-07-10 RX ORDER — ACETAMINOPHEN 500 MG
1000 TABLET ORAL EVERY 6 HOURS PRN
COMMUNITY

## 2023-07-10 RX ORDER — IBUPROFEN 200 MG
600 TABLET ORAL EVERY 6 HOURS PRN
COMMUNITY

## 2023-07-10 NOTE — PERIOP NOTE
[address]   BARIATRIC PREOPERATIVE INSTRUCTIONS    Surgery Date:   08-    Your surgeon's office or [facility] staff will call you between 4 PM- 8 PM the day before surgery with your arrival time. If your surgery is on a Monday, you will receive a call the preceding Friday. Please report to [facility] Patient Access/Admitting on the 1st floor. Bring your insurance card, photo identification, and any copayment ( if applicable). If you are going home the same day of your surgery, you must have a responsible adult to drive you home. You need to have a responsible adult to stay with you the first 24 hours after surgery and you should not drive a car for 24 hours following your surgery. Continue your liver shrinking diet until the night before surgery. Do NOT eat any solid foods after midnight the night before surgery including candy, mint or gum. You may drink clear liquids from midnight until 1 hour prior to your arrival. You may drink up to 12 ounces at one time every 4 hours. Please note special instructions, if applicable, below for medications. Absolutely NO alcohol of any kind 2 weeks before surgery and continue to avoid after surgery. Alcohol is not safe before or after surgery. Please discuss with your bariatric provider before resuming alcohol use. You must be 100% smoke free (tobacco and marijuana) for at least 3 months prior to surgery. Surgery will be cancelled if you are smoking. If you are being admitted to the hospital, please leave personal belongings/luggage in your car until you have an assigned hospital room number. Please wear comfortable clothes. Wear your glasses instead of contacts. We ask that all money, jewelry and valuables be left at home. Wear no make up, particularly mascara, the day of surgery. All body piercings, rings, and jewelry need to be removed and left at home. Please remove any nail polish or artifical nails from your fingernails.  Please wear your hair

## 2023-07-10 NOTE — PERIOP NOTE
Bariatric pre op and medication instructions printed and reviewed with patient. Two bottles of chg soap given, opportunity for questions given. Surgical site infection sheet given . Opportunity for questions given and all questions were answered. Consent signed and on chart for scheduled surgery on August 9, 2023 with Dr. Rasheed Bertrand    Refusal of blood products signed and on medical chart. PATIENT GIVEN WRITTEN INSTRUCTIONS TO GO TO THE IMAGING CENTER/CANCER CENTER [address] [facility] [address] TO HAVE CHEST XRAY COMPLETED.      Weight 328 lbs- OR Notified

## 2023-07-18 ENCOUNTER — TELEMEDICINE (OUTPATIENT)
Age: 38
End: 2023-07-18

## 2023-07-18 VITALS — WEIGHT: 293 LBS | HEIGHT: 64 IN | BODY MASS INDEX: 50.02 KG/M2

## 2023-07-18 DIAGNOSIS — Z98.890 POST-OPERATIVE NAUSEA AND VOMITING: ICD-10-CM

## 2023-07-18 DIAGNOSIS — Z99.89 OSA ON CPAP: ICD-10-CM

## 2023-07-18 DIAGNOSIS — G89.18 POST-OP PAIN: ICD-10-CM

## 2023-07-18 DIAGNOSIS — G47.33 OSA ON CPAP: ICD-10-CM

## 2023-07-18 DIAGNOSIS — R11.2 POST-OPERATIVE NAUSEA AND VOMITING: ICD-10-CM

## 2023-07-18 DIAGNOSIS — E66.01 MORBID OBESITY WITH BMI OF 50.0-59.9, ADULT (HCC): Primary | ICD-10-CM

## 2023-07-18 DIAGNOSIS — E78.2 MIXED HYPERLIPIDEMIA: ICD-10-CM

## 2023-07-18 DIAGNOSIS — I10 ESSENTIAL HYPERTENSION: ICD-10-CM

## 2023-07-18 RX ORDER — POLYETHYLENE GLYCOL 3350 17 G/17G
17 POWDER, FOR SOLUTION ORAL DAILY
Qty: 30 G | Refills: 1 | Status: SHIPPED | OUTPATIENT
Start: 2023-07-18

## 2023-07-18 RX ORDER — GABAPENTIN 100 MG/1
100 CAPSULE ORAL EVERY 8 HOURS PRN
Qty: 15 CAPSULE | Refills: 0 | Status: SHIPPED | OUTPATIENT
Start: 2023-07-18 | End: 2023-07-23

## 2023-07-18 RX ORDER — OMEPRAZOLE 40 MG/1
40 CAPSULE, DELAYED RELEASE ORAL
Qty: 30 CAPSULE | Refills: 1 | Status: SHIPPED | OUTPATIENT
Start: 2023-07-18

## 2023-07-18 RX ORDER — ONDANSETRON 4 MG/1
4 TABLET, ORALLY DISINTEGRATING ORAL EVERY 8 HOURS PRN
Qty: 30 TABLET | Refills: 1 | Status: SHIPPED | OUTPATIENT
Start: 2023-07-18

## 2023-07-18 RX ORDER — OMEPRAZOLE 40 MG/1
CAPSULE, DELAYED RELEASE ORAL
Qty: 90 CAPSULE | OUTPATIENT
Start: 2023-07-18

## 2023-07-18 RX ORDER — ENOXAPARIN SODIUM 100 MG/ML
40 INJECTION SUBCUTANEOUS DAILY
Qty: 14 EACH | Refills: 0 | Status: SHIPPED | OUTPATIENT
Start: 2023-07-18

## 2023-07-18 ASSESSMENT — PATIENT HEALTH QUESTIONNAIRE - PHQ9
SUM OF ALL RESPONSES TO PHQ9 QUESTIONS 1 & 2: 0
SUM OF ALL RESPONSES TO PHQ QUESTIONS 1-9: 0
SUM OF ALL RESPONSES TO PHQ QUESTIONS 1-9: 0
2. FEELING DOWN, DEPRESSED OR HOPELESS: 0
8. MOVING OR SPEAKING SO SLOWLY THAT OTHER PEOPLE COULD HAVE NOTICED. OR THE OPPOSITE, BEING SO FIGETY OR RESTLESS THAT YOU HAVE BEEN MOVING AROUND A LOT MORE THAN USUAL: 0
SUM OF ALL RESPONSES TO PHQ QUESTIONS 1-9: 0
4. FEELING TIRED OR HAVING LITTLE ENERGY: 0
1. LITTLE INTEREST OR PLEASURE IN DOING THINGS: 0
3. TROUBLE FALLING OR STAYING ASLEEP: 0
10. IF YOU CHECKED OFF ANY PROBLEMS, HOW DIFFICULT HAVE THESE PROBLEMS MADE IT FOR YOU TO DO YOUR WORK, TAKE CARE OF THINGS AT HOME, OR GET ALONG WITH OTHER PEOPLE: 0
9. THOUGHTS THAT YOU WOULD BE BETTER OFF DEAD, OR OF HURTING YOURSELF: 0
SUM OF ALL RESPONSES TO PHQ QUESTIONS 1-9: 0
7. TROUBLE CONCENTRATING ON THINGS, SUCH AS READING THE NEWSPAPER OR WATCHING TELEVISION: 0
6. FEELING BAD ABOUT YOURSELF - OR THAT YOU ARE A FAILURE OR HAVE LET YOURSELF OR YOUR FAMILY DOWN: 0
5. POOR APPETITE OR OVEREATING: 0

## 2023-07-24 DIAGNOSIS — F41.9 ANXIETY AND DEPRESSION: ICD-10-CM

## 2023-07-24 DIAGNOSIS — F32.A ANXIETY AND DEPRESSION: ICD-10-CM

## 2023-07-24 NOTE — TELEPHONE ENCOUNTER
PCP: YASMEEN Tuttle NP    Last appt: 5/17/2023   Future Appointments   Date Time Provider [address]   7/31/2023  1:20 PM Marli Reyna  [address] BS AMB   5/30/2024  9:30 AM Lajoyce Canavan, APRN - NP [facility] BS AMB       Requested Prescriptions     Pending Prescriptions Disp Refills    sertraline (ZOLOFT) 100 MG tablet 180 tablet 3     Sig: Take 2 tablets by mouth daily         Prior labs and Blood pressures:  BP Readings from Last 3 Encounters:   07/10/23 (!) 179/74   06/26/23 128/81   05/31/23 137/75     Lab Results   Component Value Date/Time     07/10/2023 10:33 AM    K 3.9 07/10/2023 10:33 AM     07/10/2023 10:33 AM    CO2 29 07/10/2023 10:33 AM    BUN 9 07/10/2023 10:33 AM    GFRAA 133 09/11/2020 12:00 AM     No results found for: HBA1C, GJJ3BLNE  Lab Results   Component Value Date/Time    CHOL 157 05/17/2023 12:00 AM    CHOL 167 12/28/2022 12:00 AM    HDL 44 05/17/2023 12:00 AM    VLDL 17 12/28/2022 12:00 AM     No results found for: VITD3, VD3RIA    No results found for: TSH, TSH2, TSH3

## 2023-07-25 RX ORDER — SERTRALINE HYDROCHLORIDE 100 MG/1
200 TABLET, FILM COATED ORAL DAILY
Qty: 180 TABLET | Refills: 1 | Status: SHIPPED | OUTPATIENT
Start: 2023-07-25 | End: 2023-10-23

## 2023-07-31 ENCOUNTER — OFFICE VISIT (OUTPATIENT)
Age: 38
End: 2023-07-31

## 2023-07-31 VITALS
BODY MASS INDEX: 50.02 KG/M2 | DIASTOLIC BLOOD PRESSURE: 67 MMHG | TEMPERATURE: 97.8 F | OXYGEN SATURATION: 98 % | RESPIRATION RATE: 20 BRPM | WEIGHT: 293 LBS | HEIGHT: 64 IN | HEART RATE: 67 BPM | SYSTOLIC BLOOD PRESSURE: 116 MMHG

## 2023-07-31 DIAGNOSIS — E66.01 OBESITY, MORBID (HCC): Primary | ICD-10-CM

## 2023-07-31 DIAGNOSIS — E78.2 MIXED HYPERLIPIDEMIA: ICD-10-CM

## 2023-07-31 DIAGNOSIS — I10 PRIMARY HYPERTENSION: ICD-10-CM

## 2023-07-31 DIAGNOSIS — G47.33 OSA ON CPAP: ICD-10-CM

## 2023-07-31 DIAGNOSIS — K21.9 GASTROESOPHAGEAL REFLUX DISEASE WITHOUT ESOPHAGITIS: ICD-10-CM

## 2023-07-31 DIAGNOSIS — Z99.89 OSA ON CPAP: ICD-10-CM

## 2023-07-31 ASSESSMENT — PATIENT HEALTH QUESTIONNAIRE - PHQ9
SUM OF ALL RESPONSES TO PHQ QUESTIONS 1-9: 0
SUM OF ALL RESPONSES TO PHQ9 QUESTIONS 1 & 2: 0
SUM OF ALL RESPONSES TO PHQ QUESTIONS 1-9: 0
2. FEELING DOWN, DEPRESSED OR HOPELESS: 0
SUM OF ALL RESPONSES TO PHQ QUESTIONS 1-9: 0
SUM OF ALL RESPONSES TO PHQ QUESTIONS 1-9: 0
1. LITTLE INTEREST OR PLEASURE IN DOING THINGS: 0

## 2023-08-07 ENCOUNTER — TELEPHONE (OUTPATIENT)
Age: 38
End: 2023-08-07

## 2023-08-07 NOTE — TELEPHONE ENCOUNTER
Spoke wit patient in regards to her surgery on 8/9/23. Patient has cancelled surgery and wants to try MWL instead. Provided the patient with [facility] contact information.  HHS

## 2023-08-31 ENCOUNTER — OFFICE VISIT (OUTPATIENT)
Age: 38
End: 2023-08-31
Payer: MEDICAID

## 2023-08-31 VITALS
HEART RATE: 76 BPM | SYSTOLIC BLOOD PRESSURE: 143 MMHG | RESPIRATION RATE: 18 BRPM | DIASTOLIC BLOOD PRESSURE: 76 MMHG | BODY MASS INDEX: 50.02 KG/M2 | HEIGHT: 64 IN | WEIGHT: 293 LBS | TEMPERATURE: 98.2 F | OXYGEN SATURATION: 96 %

## 2023-08-31 DIAGNOSIS — E78.2 MIXED HYPERLIPIDEMIA: ICD-10-CM

## 2023-08-31 DIAGNOSIS — E66.01 CLASS 3 SEVERE OBESITY DUE TO EXCESS CALORIES WITH SERIOUS COMORBIDITY AND BODY MASS INDEX (BMI) OF 50.0 TO 59.9 IN ADULT (HCC): Primary | ICD-10-CM

## 2023-08-31 DIAGNOSIS — I10 PRIMARY HYPERTENSION: ICD-10-CM

## 2023-08-31 PROCEDURE — 3078F DIAST BP <80 MM HG: CPT | Performed by: FAMILY MEDICINE

## 2023-08-31 PROCEDURE — 99213 OFFICE O/P EST LOW 20 MIN: CPT | Performed by: FAMILY MEDICINE

## 2023-08-31 PROCEDURE — 3074F SYST BP LT 130 MM HG: CPT | Performed by: FAMILY MEDICINE

## 2023-08-31 ASSESSMENT — PATIENT HEALTH QUESTIONNAIRE - PHQ9
1. LITTLE INTEREST OR PLEASURE IN DOING THINGS: 0
SUM OF ALL RESPONSES TO PHQ QUESTIONS 1-9: 0
SUM OF ALL RESPONSES TO PHQ9 QUESTIONS 1 & 2: 0
SUM OF ALL RESPONSES TO PHQ QUESTIONS 1-9: 0
2. FEELING DOWN, DEPRESSED OR HOPELESS: 0

## 2023-08-31 NOTE — PROGRESS NOTES
[facility] Progress Note: Initial Physician Visit     Luanne Barragan is a 41 y.o. female who is here for medical screening for entering the [facility].   The patient denies any disease state that requires protein restriction. CC: class 3 Obesity    Referred by Dr Asim Pagan reason referred  changed her mind about GBP    Weight History  I personally reviewed the Medical Screening Karma Juan completed by patient and scanned into media section of chart. It includes duration of their obesity, maximum weight, goal weight  all of which give the context of their obesity AND a Family History of their obesity. Highschool was 160  Gained more after the first cild and continued after that  This is her highest weight   323 lbs    Weight loss History  I personally reviewed the Medical Screening Karma [place] completed by the patient and scanned into media section of chart. It includes number of weight loss attempts, the weight loss program that patients was most successful using, and if they have any hx of anorectic medication use, including OTC supplements. WW a year ago , lost 10 lbs then regained soon after  Did 6 month prep for weight loss surgery and did well    Significant Medical History  Past Medical History:   Diagnosis Date    Anxiety and depression     CPAP (continuous positive airway pressure) dependence     Hypercholesterolemia     Hypertension     Sleep apnea      Patient's medicactions that may contribute  Metoprolol and sertraline    Plan to become pregant within 6 months BTL and mirena    I personally reviewed the Medical Screening Karma Juan completed by the patient and scanned into media section of chart. This allows me to assess associated symptoms that are significant in the assessment of the patient's obesity and the patient's Past Medical History.     Current Outpatient Medications   Medication Sig Dispense Refill    sertraline (ZOLOFT) 100 MG

## 2023-09-05 NOTE — TELEPHONE ENCOUNTER
Last appointment: 5/17/23 MICHELLE Garcia  Next appointment: None  Previous refill encounter(s): 12/30/22 90 + 4 (SIG: BID)     Requested Prescriptions     Pending Prescriptions Disp Refills    vitamin D (CHOLECALCIFEROL) 50 MCG (2000 UT) CAPS capsule 180 capsule 1     Sig: Take 1 capsule by mouth 2 times daily     For Pharmacy Admin Tracking Only    Program: Medication Refill  CPA in place:    Recommendation Provided To:    Intervention Detail: New Rx: 1, reason: Patient Preference  Intervention Accepted By:   Mancel Seats Closed?:    Time Spent (min): 5

## 2023-09-14 ENCOUNTER — OFFICE VISIT (OUTPATIENT)
Age: 38
End: 2023-09-14
Payer: MEDICAID

## 2023-09-14 VITALS
RESPIRATION RATE: 16 BRPM | WEIGHT: 293 LBS | HEIGHT: 64 IN | OXYGEN SATURATION: 96 % | TEMPERATURE: 98.3 F | BODY MASS INDEX: 50.02 KG/M2 | DIASTOLIC BLOOD PRESSURE: 70 MMHG | HEART RATE: 88 BPM | SYSTOLIC BLOOD PRESSURE: 116 MMHG

## 2023-09-14 DIAGNOSIS — I10 PRIMARY HYPERTENSION: ICD-10-CM

## 2023-09-14 DIAGNOSIS — E66.01 MORBID OBESITY (HCC): Primary | ICD-10-CM

## 2023-09-14 DIAGNOSIS — K21.9 GASTROESOPHAGEAL REFLUX DISEASE WITHOUT ESOPHAGITIS: ICD-10-CM

## 2023-09-14 DIAGNOSIS — E78.2 MIXED HYPERLIPIDEMIA: ICD-10-CM

## 2023-09-14 PROCEDURE — 99213 OFFICE O/P EST LOW 20 MIN: CPT | Performed by: SURGERY

## 2023-09-14 PROCEDURE — 3078F DIAST BP <80 MM HG: CPT | Performed by: SURGERY

## 2023-09-14 PROCEDURE — 3074F SYST BP LT 130 MM HG: CPT | Performed by: SURGERY

## 2023-09-14 ASSESSMENT — PATIENT HEALTH QUESTIONNAIRE - PHQ9
SUM OF ALL RESPONSES TO PHQ QUESTIONS 1-9: 0
1. LITTLE INTEREST OR PLEASURE IN DOING THINGS: 0
SUM OF ALL RESPONSES TO PHQ9 QUESTIONS 1 & 2: 0
SUM OF ALL RESPONSES TO PHQ QUESTIONS 1-9: 0
2. FEELING DOWN, DEPRESSED OR HOPELESS: 0
SUM OF ALL RESPONSES TO PHQ QUESTIONS 1-9: 0
SUM OF ALL RESPONSES TO PHQ QUESTIONS 1-9: 0

## 2023-09-18 ENCOUNTER — TELEPHONE (OUTPATIENT)
Age: 38
End: 2023-09-18

## 2023-09-18 NOTE — TELEPHONE ENCOUNTER
Spoke to [place] to see if prior authorization was still good. She said it wasn't so I started the process with her over the phone.       Spoke to Imelda Melchor auth# [id number]  Fax# [phone]  Call ref# [id number]

## 2023-09-18 NOTE — TELEPHONE ENCOUNTER
Spoke to patient to let her know I reached out to her insurance to see if her prior authorization was still good and found out it wasn't that I had to resubmit. I let her know I will call her as soon as I hear from them.  She acknowledged ok and thank you

## 2023-09-25 DIAGNOSIS — F32.A ANXIETY AND DEPRESSION: ICD-10-CM

## 2023-09-25 DIAGNOSIS — F41.9 ANXIETY AND DEPRESSION: ICD-10-CM

## 2023-09-26 ENCOUNTER — TELEPHONE (OUTPATIENT)
Age: 38
End: 2023-09-26

## 2023-09-26 RX ORDER — HYDROXYZINE 50 MG/1
50 TABLET, FILM COATED ORAL DAILY PRN
Qty: 30 TABLET | Refills: 0 | Status: SHIPPED | OUTPATIENT
Start: 2023-09-26 | End: 2023-11-21

## 2023-09-26 NOTE — TELEPHONE ENCOUNTER
Spoke to patient, I offered 11/8/23 for surgery and she accepted. I let her know that I will be scheduling her pre-op appointments which are: virtual diet and info class, PAT, H&P and to meet with the doctor to sign consent. That is any of these appointments are no showed or rescheduled it can push out her surgery date. She understood. I informed her that once everything is scheduled I will put all the information in a letter with dates, times, who they are going to see and if it is virtual or in person. This letter will post to your my chart and I will send it out in the mail. I will also call you once it is completed. You will her from me by end of day today.   She acknowledged ok and thank you

## 2023-09-26 NOTE — TELEPHONE ENCOUNTER
Spoke to patient to let her know her surgery letter has posted to her my chart and will go out in the mail. I reviewed the diet and info class that that won't show up in her my chart. I informed her to be on the look out for an e-mail from [facility] in her and to check junk/spam folder. I explained what will be in the e-mail and to please reply to it so we know you received it.   She acknowledged ok and thank you

## 2023-09-28 DIAGNOSIS — I10 PRIMARY HYPERTENSION: ICD-10-CM

## 2023-09-28 RX ORDER — AMLODIPINE BESYLATE 10 MG/1
10 TABLET ORAL DAILY
Qty: 30 TABLET | Refills: 0 | Status: SHIPPED | OUTPATIENT
Start: 2023-09-28 | End: 2023-11-20 | Stop reason: SDUPTHER

## 2023-09-28 NOTE — TELEPHONE ENCOUNTER
PCP: Margi Guevara APRN - NP    Last appt: 5/17/2023   Future Appointments   Date Time Provider [address]   10/10/2023  8:00 AM [facility] PAT EXAM RM 5 [facility]   10/18/2023  2:20 PM Melissa Bai, APRN -  [address] BS AMB   10/30/2023  1:40 PM Krys Torres  [address] BS AMB   5/30/2024  9:30 AM Siomara Mccullough [facility]       Requested Prescriptions     Pending Prescriptions Disp Refills    amLODIPine (NORVASC) 10 MG tablet [Pharmacy Med Name: AMLODIPINE BESYLATE 10MG TABLETS] 90 tablet 1     Sig: TAKE 1 TABLET BY MOUTH DAILY         Prior labs and Blood pressures:  BP Readings from Last 3 Encounters:   09/14/23 116/70   08/31/23 (!) 143/76   08/24/23 112/76     Lab Results   Component Value Date/Time     07/10/2023 10:33 AM    K 3.9 07/10/2023 10:33 AM     07/10/2023 10:33 AM    CO2 29 07/10/2023 10:33 AM    BUN 9 07/10/2023 10:33 AM    GFRAA 133 09/11/2020 12:00 AM     No results found for: \"HBA1C\", \"CNC6ANTN\"  Lab Results   Component Value Date/Time    CHOL 157 05/17/2023 12:00 AM    CHOL 167 12/28/2022 12:00 AM    HDL 44 05/17/2023 12:00 AM    VLDL 17 12/28/2022 12:00 AM     No results found for: \"VITD3\", \"VD3RIA\"    No results found for: \"TSH\", \"TSH2\", \"TSH3\"

## 2023-10-10 ENCOUNTER — HOSPITAL ENCOUNTER (OUTPATIENT)
Facility: HOSPITAL | Age: 38
Discharge: HOME | End: 2023-10-13
Payer: MEDICAID

## 2023-10-10 VITALS
SYSTOLIC BLOOD PRESSURE: 143 MMHG | WEIGHT: 293 LBS | DIASTOLIC BLOOD PRESSURE: 80 MMHG | RESPIRATION RATE: 12 BRPM | TEMPERATURE: 97.5 F | HEIGHT: 64 IN | HEART RATE: 76 BPM | BODY MASS INDEX: 50.02 KG/M2

## 2023-10-10 LAB
ALBUMIN SERPL-MCNC: 3.4 G/DL (ref 3.5–5)
ALBUMIN/GLOB SERPL: 0.8 (ref 1.1–2.2)
ALP SERPL-CCNC: 77 U/L (ref 45–117)
ALT SERPL-CCNC: 30 U/L (ref 12–78)
ANION GAP SERPL CALC-SCNC: 5 MMOL/L (ref 5–15)
APPEARANCE UR: CLEAR
AST SERPL-CCNC: 13 U/L (ref 15–37)
BACTERIA URNS QL MICRO: NEGATIVE /HPF
BASOPHILS # BLD: 0.1 K/UL (ref 0–0.1)
BASOPHILS NFR BLD: 1 % (ref 0–1)
BILIRUB SERPL-MCNC: 0.3 MG/DL (ref 0.2–1)
BILIRUB UR QL: NEGATIVE
BUN SERPL-MCNC: 10 MG/DL (ref 6–20)
BUN/CREAT SERPL: 15 (ref 12–20)
CALCIUM SERPL-MCNC: 8.7 MG/DL (ref 8.5–10.1)
CHLORIDE SERPL-SCNC: 108 MMOL/L (ref 97–108)
CO2 SERPL-SCNC: 27 MMOL/L (ref 21–32)
COLOR UR: NORMAL
CREAT SERPL-MCNC: 0.65 MG/DL (ref 0.55–1.02)
DIFFERENTIAL METHOD BLD: NORMAL
EOSINOPHIL # BLD: 0.1 K/UL (ref 0–0.4)
EOSINOPHIL NFR BLD: 1 % (ref 0–7)
EPITH CASTS URNS QL MICRO: NORMAL /LPF
ERYTHROCYTE [DISTWIDTH] IN BLOOD BY AUTOMATED COUNT: 13.2 % (ref 11.5–14.5)
GLOBULIN SER CALC-MCNC: 4.2 G/DL (ref 2–4)
GLUCOSE SERPL-MCNC: 97 MG/DL (ref 65–100)
GLUCOSE UR STRIP.AUTO-MCNC: NEGATIVE MG/DL
HCT VFR BLD AUTO: 39.1 % (ref 35–47)
HGB BLD-MCNC: 12.7 G/DL (ref 11.5–16)
HGB UR QL STRIP: NEGATIVE
HYALINE CASTS URNS QL MICRO: NORMAL /LPF (ref 0–5)
IMM GRANULOCYTES # BLD AUTO: 0 K/UL (ref 0–0.04)
IMM GRANULOCYTES NFR BLD AUTO: 0 % (ref 0–0.5)
KETONES UR QL STRIP.AUTO: NEGATIVE MG/DL
LEUKOCYTE ESTERASE UR QL STRIP.AUTO: NEGATIVE
LYMPHOCYTES # BLD: 2.4 K/UL (ref 0.8–3.5)
LYMPHOCYTES NFR BLD: 30 % (ref 12–49)
MAGNESIUM SERPL-MCNC: 1.7 MG/DL (ref 1.6–2.4)
MCH RBC QN AUTO: 29.4 PG (ref 26–34)
MCHC RBC AUTO-ENTMCNC: 32.5 G/DL (ref 30–36.5)
MCV RBC AUTO: 90.5 FL (ref 80–99)
MONOCYTES # BLD: 0.4 K/UL (ref 0–1)
MONOCYTES NFR BLD: 5 % (ref 5–13)
NEUTS SEG # BLD: 5 K/UL (ref 1.8–8)
NEUTS SEG NFR BLD: 63 % (ref 32–75)
NITRITE UR QL STRIP.AUTO: NEGATIVE
NRBC # BLD: 0 K/UL (ref 0–0.01)
NRBC BLD-RTO: 0 PER 100 WBC
PH UR STRIP: 6.5 (ref 5–8)
PLATELET # BLD AUTO: 212 K/UL (ref 150–400)
PMV BLD AUTO: 10.8 FL (ref 8.9–12.9)
POTASSIUM SERPL-SCNC: 4.1 MMOL/L (ref 3.5–5.1)
PROT SERPL-MCNC: 7.6 G/DL (ref 6.4–8.2)
PROT UR STRIP-MCNC: NEGATIVE MG/DL
RBC # BLD AUTO: 4.32 M/UL (ref 3.8–5.2)
RBC #/AREA URNS HPF: NORMAL /HPF (ref 0–5)
SODIUM SERPL-SCNC: 140 MMOL/L (ref 136–145)
SP GR UR REFRACTOMETRY: 1.01 (ref 1–1.03)
T4 FREE SERPL-MCNC: 0.9 NG/DL (ref 0.8–1.5)
TSH SERPL DL<=0.05 MIU/L-ACNC: 1.85 UIU/ML (ref 0.36–3.74)
URINE CULTURE IF INDICATED: NORMAL
UROBILINOGEN UR QL STRIP.AUTO: 0.2 EU/DL (ref 0.2–1)
WBC # BLD AUTO: 8 K/UL (ref 3.6–11)
WBC URNS QL MICRO: NORMAL /HPF (ref 0–4)

## 2023-10-10 PROCEDURE — 83735 ASSAY OF MAGNESIUM: CPT

## 2023-10-10 PROCEDURE — 81001 URINALYSIS AUTO W/SCOPE: CPT

## 2023-10-10 PROCEDURE — 84439 ASSAY OF FREE THYROXINE: CPT

## 2023-10-10 PROCEDURE — 80053 COMPREHEN METABOLIC PANEL: CPT

## 2023-10-10 PROCEDURE — 36415 COLL VENOUS BLD VENIPUNCTURE: CPT

## 2023-10-10 PROCEDURE — 84443 ASSAY THYROID STIM HORMONE: CPT

## 2023-10-10 PROCEDURE — 85025 COMPLETE CBC W/AUTO DIFF WBC: CPT

## 2023-10-10 NOTE — PERIOP NOTE
Surgical consent obtained and signed by patient. BLOOD REFUSAL CONSENT OBTAINED AND SIGNED BY PATIENT. PATTE IN OR NOTIFIED OF WEIGHT -  329.8 LBS.

## 2023-10-10 NOTE — PERIOP NOTE
[facility]  BARIATRIC PREOPERATIVE INSTRUCTIONS    Surgery Date:   11/8/23    Your surgeon's office or [facility] staff will call you between 4 PM- 8 PM the day before surgery with your arrival time. If your surgery is on a Monday, you will receive a call the preceding Friday. If your surgeon's office has given your, your arrival time, then go by that time. Please report to [facility] Patient Access/Admitting on the 1st floor. Bring your insurance card, photo identification, and any copayment ( if applicable). If you are going home the same day of your surgery, you must have a responsible adult to drive you home. You need to have a responsible adult to stay with you the first 24 hours after surgery and you should not drive a car for 24 hours following your surgery. If you are being admitted to the hospital, please leave personal belongings/luggage in your car until you have an assigned hospital room number. Absolutely NO alcohol of any kind 2 weeks before surgery and continue to avoid after surgery. Alcohol is not safe before or after surgery. Please discuss with your bariatric provider before resuming alcohol use. You must be 100% smoke free (tobacco and marijuana) for at least 3 months prior to surgery. Surgery will be cancelled if you are smoking. Please wear comfortable clothes. Wear your glasses instead of contacts. We ask that all money, jewelry and valuables be left at home. Wear no make up, particularly mascara, the day of surgery. All body piercings, rings, and jewelry need to be removed and left at home. Remove all nail polish except for clear. Please wear your hair loose or down, no pony-tails, buns, or any metal hair accessories. You may wear deodorant. Do not shave any body area within 24 hours of your surgery. Please follow all instructions to avoid any potential surgical cancellation.   Should your physical condition change, (i.e. fever, cold, flu, etc.) please notify your

## 2023-10-17 ENCOUNTER — TELEPHONE (OUTPATIENT)
Age: 38
End: 2023-10-17

## 2023-10-17 NOTE — TELEPHONE ENCOUNTER
Called patient to remind and confirm of VV tomorrow at 2:20p with Pawan Salazar. Patient answered and confirmed she will be there.

## 2023-10-18 ENCOUNTER — TELEMEDICINE (OUTPATIENT)
Age: 38
End: 2023-10-18

## 2023-10-18 DIAGNOSIS — G89.18 POST-OP PAIN: ICD-10-CM

## 2023-10-18 DIAGNOSIS — E66.01 MORBID OBESITY (HCC): Primary | ICD-10-CM

## 2023-10-18 DIAGNOSIS — I10 PRIMARY HYPERTENSION: ICD-10-CM

## 2023-10-18 DIAGNOSIS — E78.2 MIXED HYPERLIPIDEMIA: ICD-10-CM

## 2023-10-18 DIAGNOSIS — G47.33 OSA ON CPAP: ICD-10-CM

## 2023-10-18 PROCEDURE — 99024 POSTOP FOLLOW-UP VISIT: CPT | Performed by: NURSE PRACTITIONER

## 2023-10-18 RX ORDER — OMEPRAZOLE 40 MG/1
40 CAPSULE, DELAYED RELEASE ORAL
Qty: 90 CAPSULE | Refills: 1 | Status: SHIPPED | OUTPATIENT
Start: 2023-10-18

## 2023-10-18 RX ORDER — ONDANSETRON 4 MG/1
4 TABLET, FILM COATED ORAL EVERY 8 HOURS PRN
Qty: 30 TABLET | Refills: 0 | Status: SHIPPED | OUTPATIENT
Start: 2023-10-18

## 2023-10-18 RX ORDER — POLYETHYLENE GLYCOL 3350 17 G/17G
17 POWDER, FOR SOLUTION ORAL DAILY
Qty: 1530 G | Refills: 0 | Status: SHIPPED | OUTPATIENT
Start: 2023-10-18 | End: 2024-01-16

## 2023-10-18 RX ORDER — ENOXAPARIN SODIUM 100 MG/ML
40 INJECTION SUBCUTANEOUS DAILY
Qty: 14 EACH | Refills: 0 | Status: SHIPPED | OUTPATIENT
Start: 2023-11-08

## 2023-10-18 RX ORDER — GABAPENTIN 100 MG/1
100-200 CAPSULE ORAL 3 TIMES DAILY
Qty: 30 CAPSULE | Refills: 0 | Status: SHIPPED | OUTPATIENT
Start: 2023-10-18 | End: 2023-10-23

## 2023-10-18 ASSESSMENT — PATIENT HEALTH QUESTIONNAIRE - PHQ9
SUM OF ALL RESPONSES TO PHQ QUESTIONS 1-9: 0
2. FEELING DOWN, DEPRESSED OR HOPELESS: 0
SUM OF ALL RESPONSES TO PHQ QUESTIONS 1-9: 0
SUM OF ALL RESPONSES TO PHQ9 QUESTIONS 1 & 2: 0
1. LITTLE INTEREST OR PLEASURE IN DOING THINGS: 0
SUM OF ALL RESPONSES TO PHQ QUESTIONS 1-9: 0
SUM OF ALL RESPONSES TO PHQ QUESTIONS 1-9: 0

## 2023-10-18 NOTE — PROGRESS NOTES
LMP 09/05/2023 (Approximate) Comment: HAS IUD      Plan:   1/2. Morbid Obesity- Patient is schedule for Gastric bypass with Dr.Brennan Guerrero on 11/8/2023 at [facility] patient in regard to post diet restrictions, follow- up office visits, follow- up care. Patient as received educational booklet and vitamin list. Reviewed liver shrinking diet. Start date for Liver shrinking diet 10/18/2023  ERAS protocol reviewed:  Acetaminophen 1000 mg at noon and 8 pm day before surgery and may have clear liquids (no caffeine, no ETOH, no carbonation and calorie free) until 3 hours prior to surgery   Preadmission testing results reviewed with patient   Post operative prescriptions sent to pharmacy on file for AFTER surgery:    Acid suppression  Prilosec 40 mg x 30 days, then reassess need    Antiemetic Zofran 4 mg every 8 hours as needed for nausea #30   Antispasmodic na  Laxative:  Miralax 1 packet every day   DVT prophylaxis:     early ambulation and mechanical compression, non-smoker for at least 90 days   Post op pain management:  Gabapentin 100-200 mg q 8 hours prn pain x 5 days; acetaminophen 1000 mg po q 8 hours prn; abdominal support / splinting; heating pad prn   Reviewed with patient that lifelong vitamin supplementation is recommended by provider as well as risks of non-compliance. 3/4. HTN/hyperlipidemia-follow up with PCP 4 weeks post op. 5. CATRINA- Bring CPAP with her to the hospital.     Pt verbalized understanding and questions were answered to the best of my knowledge and ability. Ana Rosa Barnes, was evaluated through a synchronous (real-time) audio-video encounter. The patient (or guardian if applicable) is aware that this is a billable service, which includes applicable co-pays. This Virtual Visit was conducted with patient's (and/or legal guardian's) consent. Patient identification was verified, and a caregiver was present when appropriate.    The patient was located at Home: [address]

## 2023-10-18 NOTE — PATIENT INSTRUCTIONS
You will start the liver shrinking diet 2 weeks before surgery unless otherwise told by physician or NP. Follow your handbooks instructions for Liver shrinking diet. Enhanced Recovery after Surgery (ERAS)  Take 1000mg of Tylenol with lunch and dinner the day before surgery. You may drink clear liquids up to 1 hours before surgery. Do NOT start medications prescribed until after surgery. COVID_19 testing  Pre-Admission testing will be in touch with you in regards to COVID-19 testing. You will be required to be tested 96 hours prior to surgery. Failure to have test completed or a positive result will require rescheduling of your procedure. Your first follow up visit will be a face to face visit. Your second and third follow up will be virtual.     Diet after surgery until your 3 week follow up visit. Bariatric Full Liquids - 2 weeks   What is this diet? Easy to swallow liquids allow your stomach to heal after surgery  Prevents dehydration  Introduction of liquid meals (protein shakes)  When do I begin? The morning after surgery  Use 1 ounce (30 mL) cups  Initial goal is to drink 4 ounces of fluid over 1 hour (3 oz. water + 1 oz. protein shake). The rate at which you drink should be controlled. Take a sip and evaluate how you feel before you continue to drink. Repeat every hour while awake  Discharge Goal:  Consume & tolerate at least 4 ounces per hour for 4 hours  Stay on liquids for 2 weeks at home  What foods can I eat? No sugar added, non-carbonated, non-caffeinated fluids  Meal replacement shakes (2-3 per day), from the approved list  Strained, blenderized soup  Limit juice to 4 ounces per day. Choose only 100% no sugar added fruit juice. Juice can be diluted with water. Key Points  Sip, do not gulp  Use 1 ounce medicine cups to control the rate    Stop when full.  If you feel fullness, pain or nausea stop sipping until the feeling passes  Do not drink from a

## 2023-10-18 NOTE — PROGRESS NOTES
Identified pt with two pt identifiers (name and ). Reviewed chart in preparation for visit and have obtained necessary documentation. Jani Vieira is a 41 y.o. female  Chief Complaint   Patient presents with    H&P     2023 Gastric Bypass Dr. Bettina Jiménez      [facility] 2023 (Approximate) Comment: HAS IUD    1. Have you been to the ER, urgent care clinic since your last visit? Hospitalized since your last visit?no    2. Have you seen or consulted any other health care providers outside of the [address] [place] [address] since your last visit? Include any pap smears or colon screening.  no

## 2023-10-30 ENCOUNTER — OFFICE VISIT (OUTPATIENT)
Age: 38
End: 2023-10-30

## 2023-10-30 VITALS
DIASTOLIC BLOOD PRESSURE: 78 MMHG | OXYGEN SATURATION: 95 % | SYSTOLIC BLOOD PRESSURE: 135 MMHG | HEIGHT: 64 IN | TEMPERATURE: 98.2 F | RESPIRATION RATE: 15 BRPM | BODY MASS INDEX: 50.02 KG/M2 | WEIGHT: 293 LBS | HEART RATE: 83 BPM

## 2023-10-30 DIAGNOSIS — I10 PRIMARY HYPERTENSION: ICD-10-CM

## 2023-10-30 DIAGNOSIS — E78.2 MIXED HYPERLIPIDEMIA: ICD-10-CM

## 2023-10-30 DIAGNOSIS — K21.9 GASTROESOPHAGEAL REFLUX DISEASE WITHOUT ESOPHAGITIS: ICD-10-CM

## 2023-10-30 DIAGNOSIS — E66.01 OBESITY, MORBID (HCC): Primary | ICD-10-CM

## 2023-10-30 ASSESSMENT — PATIENT HEALTH QUESTIONNAIRE - PHQ9
SUM OF ALL RESPONSES TO PHQ QUESTIONS 1-9: 0
1. LITTLE INTEREST OR PLEASURE IN DOING THINGS: 0
2. FEELING DOWN, DEPRESSED OR HOPELESS: 0
SUM OF ALL RESPONSES TO PHQ QUESTIONS 1-9: 0
SUM OF ALL RESPONSES TO PHQ9 QUESTIONS 1 & 2: 0

## 2023-11-07 ENCOUNTER — TELEPHONE (OUTPATIENT)
Age: 38
End: 2023-11-07

## 2023-11-07 RX ORDER — BUPIVACAINE HYDROCHLORIDE 5 MG/ML
30 INJECTION, SOLUTION PERINEURAL ONCE
Status: CANCELLED | OUTPATIENT
Start: 2023-11-07 | End: 2023-11-07

## 2023-11-07 NOTE — TELEPHONE ENCOUNTER
Spoke to patient to let her know her surgery time has moved up for tomorrow. Surgery is now at 11:30 with arrival time of 9:30.  She acknowledged by repeating it back to me

## 2023-11-08 ENCOUNTER — HOSPITAL ENCOUNTER (INPATIENT)
Facility: HOSPITAL | Age: 38
LOS: 2 days | Discharge: HOME | End: 2023-11-10
Attending: SURGERY | Admitting: SURGERY
Payer: MEDICAID

## 2023-11-08 ENCOUNTER — ANESTHESIA (OUTPATIENT)
Facility: HOSPITAL | Age: 38
End: 2023-11-08
Payer: MEDICAID

## 2023-11-08 ENCOUNTER — ANESTHESIA EVENT (OUTPATIENT)
Facility: HOSPITAL | Age: 38
End: 2023-11-08
Payer: MEDICAID

## 2023-11-08 DIAGNOSIS — Z98.84 S/P GASTRIC BYPASS: Primary | ICD-10-CM

## 2023-11-08 PROBLEM — E66.01 MORBID OBESITY (HCC): Status: ACTIVE | Noted: 2023-11-08

## 2023-11-08 LAB — HCG UR QL: NEGATIVE

## 2023-11-08 PROCEDURE — 3700000000 HC ANESTHESIA ATTENDED CARE: Performed by: SURGERY

## 2023-11-08 PROCEDURE — 6360000002 HC RX W HCPCS: Performed by: NURSE ANESTHETIST, CERTIFIED REGISTERED

## 2023-11-08 PROCEDURE — 43644 LAP GASTRIC BYPASS/ROUX-EN-Y: CPT | Performed by: SURGERY

## 2023-11-08 PROCEDURE — 2720000010 HC SURG SUPPLY STERILE: Performed by: SURGERY

## 2023-11-08 PROCEDURE — 6360000002 HC RX W HCPCS: Performed by: SURGERY

## 2023-11-08 PROCEDURE — 2500000003 HC RX 250 WO HCPCS: Performed by: NURSE ANESTHETIST, CERTIFIED REGISTERED

## 2023-11-08 PROCEDURE — 43644 LAP GASTRIC BYPASS/ROUX-EN-Y: CPT | Performed by: PHYSICIAN ASSISTANT

## 2023-11-08 PROCEDURE — 2500000003 HC RX 250 WO HCPCS: Performed by: SURGERY

## 2023-11-08 PROCEDURE — 2580000003 HC RX 258: Performed by: STUDENT IN AN ORGANIZED HEALTH CARE EDUCATION/TRAINING PROGRAM

## 2023-11-08 PROCEDURE — S2900 ROBOTIC SURGICAL SYSTEM: HCPCS | Performed by: SURGERY

## 2023-11-08 PROCEDURE — 6370000000 HC RX 637 (ALT 250 FOR IP): Performed by: SURGERY

## 2023-11-08 PROCEDURE — 7100000000 HC PACU RECOVERY - FIRST 15 MIN: Performed by: SURGERY

## 2023-11-08 PROCEDURE — 3600000019 HC SURGERY ROBOT ADDTL 15MIN: Performed by: SURGERY

## 2023-11-08 PROCEDURE — C1781 MESH (IMPLANTABLE): HCPCS | Performed by: SURGERY

## 2023-11-08 PROCEDURE — 8E0W4CZ ROBOTIC ASSISTED PROCEDURE OF TRUNK REGION, PERCUTANEOUS ENDOSCOPIC APPROACH: ICD-10-PCS | Performed by: SURGERY

## 2023-11-08 PROCEDURE — 81025 URINE PREGNANCY TEST: CPT

## 2023-11-08 PROCEDURE — P9045 ALBUMIN (HUMAN), 5%, 250 ML: HCPCS | Performed by: NURSE ANESTHETIST, CERTIFIED REGISTERED

## 2023-11-08 PROCEDURE — 3600000009 HC SURGERY ROBOT BASE: Performed by: SURGERY

## 2023-11-08 PROCEDURE — 0DJ08ZZ INSPECTION OF UPPER INTESTINAL TRACT, VIA NATURAL OR ARTIFICIAL OPENING ENDOSCOPIC: ICD-10-PCS | Performed by: SURGERY

## 2023-11-08 PROCEDURE — 3700000001 HC ADD 15 MINUTES (ANESTHESIA): Performed by: SURGERY

## 2023-11-08 PROCEDURE — 1100000000 HC RM PRIVATE

## 2023-11-08 PROCEDURE — 2500000003 HC RX 250 WO HCPCS: Performed by: STUDENT IN AN ORGANIZED HEALTH CARE EDUCATION/TRAINING PROGRAM

## 2023-11-08 PROCEDURE — 2709999900 HC NON-CHARGEABLE SUPPLY: Performed by: SURGERY

## 2023-11-08 PROCEDURE — 2580000003 HC RX 258: Performed by: NURSE ANESTHETIST, CERTIFIED REGISTERED

## 2023-11-08 PROCEDURE — 0D164ZA BYPASS STOMACH TO JEJUNUM, PERCUTANEOUS ENDOSCOPIC APPROACH: ICD-10-PCS | Performed by: SURGERY

## 2023-11-08 PROCEDURE — 2580000003 HC RX 258: Performed by: SURGERY

## 2023-11-08 PROCEDURE — 7100000001 HC PACU RECOVERY - ADDTL 15 MIN: Performed by: SURGERY

## 2023-11-08 RX ORDER — GABAPENTIN 250 MG/5ML
125 SOLUTION ORAL
Status: COMPLETED | OUTPATIENT
Start: 2023-11-08 | End: 2023-11-08

## 2023-11-08 RX ORDER — ACETAMINOPHEN 160 MG/5ML
650 LIQUID ORAL
Status: COMPLETED | OUTPATIENT
Start: 2023-11-08 | End: 2023-11-08

## 2023-11-08 RX ORDER — SODIUM CHLORIDE 0.9 % (FLUSH) 0.9 %
5-40 SYRINGE (ML) INJECTION PRN
Status: DISCONTINUED | OUTPATIENT
Start: 2023-11-08 | End: 2023-11-08 | Stop reason: HOSPADM

## 2023-11-08 RX ORDER — SODIUM CHLORIDE 0.9 % (FLUSH) 0.9 %
5-40 SYRINGE (ML) INJECTION EVERY 12 HOURS SCHEDULED
Status: DISCONTINUED | OUTPATIENT
Start: 2023-11-08 | End: 2023-11-08 | Stop reason: HOSPADM

## 2023-11-08 RX ORDER — HYDROMORPHONE HYDROCHLORIDE 2 MG/ML
INJECTION, SOLUTION INTRAMUSCULAR; INTRAVENOUS; SUBCUTANEOUS PRN
Status: DISCONTINUED | OUTPATIENT
Start: 2023-11-08 | End: 2023-11-08 | Stop reason: SDUPTHER

## 2023-11-08 RX ORDER — ONDANSETRON 2 MG/ML
4 INJECTION INTRAMUSCULAR; INTRAVENOUS
Status: DISCONTINUED | OUTPATIENT
Start: 2023-11-08 | End: 2023-11-08 | Stop reason: HOSPADM

## 2023-11-08 RX ORDER — ACETAMINOPHEN 500 MG
1000 TABLET ORAL ONCE
Status: DISCONTINUED | OUTPATIENT
Start: 2023-11-08 | End: 2023-11-08 | Stop reason: SDUPTHER

## 2023-11-08 RX ORDER — ROCURONIUM BROMIDE 10 MG/ML
INJECTION, SOLUTION INTRAVENOUS PRN
Status: DISCONTINUED | OUTPATIENT
Start: 2023-11-08 | End: 2023-11-08 | Stop reason: SDUPTHER

## 2023-11-08 RX ORDER — PROCHLORPERAZINE EDISYLATE 5 MG/ML
5 INJECTION INTRAMUSCULAR; INTRAVENOUS
Status: DISCONTINUED | OUTPATIENT
Start: 2023-11-08 | End: 2023-11-08 | Stop reason: HOSPADM

## 2023-11-08 RX ORDER — METOPROLOL TARTRATE 5 MG/5ML
5 INJECTION INTRAVENOUS EVERY 6 HOURS
Status: DISCONTINUED | OUTPATIENT
Start: 2023-11-08 | End: 2023-11-09

## 2023-11-08 RX ORDER — SODIUM CHLORIDE, SODIUM LACTATE, POTASSIUM CHLORIDE, CALCIUM CHLORIDE 600; 310; 30; 20 MG/100ML; MG/100ML; MG/100ML; MG/100ML
INJECTION, SOLUTION INTRAVENOUS CONTINUOUS
Status: DISCONTINUED | OUTPATIENT
Start: 2023-11-08 | End: 2023-11-08 | Stop reason: HOSPADM

## 2023-11-08 RX ORDER — SODIUM CHLORIDE 0.9 % (FLUSH) 0.9 %
5-40 SYRINGE (ML) INJECTION PRN
Status: DISCONTINUED | OUTPATIENT
Start: 2023-11-08 | End: 2023-11-10 | Stop reason: HOSPADM

## 2023-11-08 RX ORDER — SODIUM CHLORIDE 9 MG/ML
INJECTION, SOLUTION INTRAVENOUS PRN
Status: DISCONTINUED | OUTPATIENT
Start: 2023-11-08 | End: 2023-11-08 | Stop reason: HOSPADM

## 2023-11-08 RX ORDER — HYDROMORPHONE HYDROCHLORIDE 1 MG/ML
1 INJECTION, SOLUTION INTRAMUSCULAR; INTRAVENOUS; SUBCUTANEOUS
Status: DISCONTINUED | OUTPATIENT
Start: 2023-11-08 | End: 2023-11-10 | Stop reason: HOSPADM

## 2023-11-08 RX ORDER — LIDOCAINE HYDROCHLORIDE ANHYDROUS AND DEXTROSE MONOHYDRATE .8; 5 G/100ML; G/100ML
INJECTION, SOLUTION INTRAVENOUS CONTINUOUS PRN
Status: DISCONTINUED | OUTPATIENT
Start: 2023-11-08 | End: 2023-11-08 | Stop reason: SDUPTHER

## 2023-11-08 RX ORDER — SUCCINYLCHOLINE/SOD CL,ISO/PF 200MG/10ML
SYRINGE (ML) INTRAVENOUS PRN
Status: DISCONTINUED | OUTPATIENT
Start: 2023-11-08 | End: 2023-11-08 | Stop reason: SDUPTHER

## 2023-11-08 RX ORDER — HYDRALAZINE HYDROCHLORIDE 20 MG/ML
10 INJECTION INTRAMUSCULAR; INTRAVENOUS
Status: DISCONTINUED | OUTPATIENT
Start: 2023-11-08 | End: 2023-11-08 | Stop reason: HOSPADM

## 2023-11-08 RX ORDER — ALBUMIN, HUMAN INJ 5% 5 %
SOLUTION INTRAVENOUS PRN
Status: DISCONTINUED | OUTPATIENT
Start: 2023-11-08 | End: 2023-11-08 | Stop reason: SDUPTHER

## 2023-11-08 RX ORDER — ONDANSETRON 2 MG/ML
INJECTION INTRAMUSCULAR; INTRAVENOUS PRN
Status: DISCONTINUED | OUTPATIENT
Start: 2023-11-08 | End: 2023-11-08 | Stop reason: SDUPTHER

## 2023-11-08 RX ORDER — HYDROMORPHONE HYDROCHLORIDE 1 MG/ML
0.5 INJECTION, SOLUTION INTRAMUSCULAR; INTRAVENOUS; SUBCUTANEOUS EVERY 5 MIN PRN
Status: DISCONTINUED | OUTPATIENT
Start: 2023-11-08 | End: 2023-11-08 | Stop reason: HOSPADM

## 2023-11-08 RX ORDER — PHENYLEPHRINE HCL IN 0.9% NACL 0.4MG/10ML
SYRINGE (ML) INTRAVENOUS PRN
Status: DISCONTINUED | OUTPATIENT
Start: 2023-11-08 | End: 2023-11-08 | Stop reason: SDUPTHER

## 2023-11-08 RX ORDER — GABAPENTIN 250 MG/5ML
125 SOLUTION ORAL EVERY 8 HOURS SCHEDULED
Status: DISCONTINUED | OUTPATIENT
Start: 2023-11-08 | End: 2023-11-10 | Stop reason: HOSPADM

## 2023-11-08 RX ORDER — SODIUM CHLORIDE 0.9 % (FLUSH) 0.9 %
5-40 SYRINGE (ML) INJECTION EVERY 12 HOURS SCHEDULED
Status: DISCONTINUED | OUTPATIENT
Start: 2023-11-08 | End: 2023-11-10 | Stop reason: HOSPADM

## 2023-11-08 RX ORDER — OXYCODONE HYDROCHLORIDE 5 MG/1
5 TABLET ORAL
Status: DISCONTINUED | OUTPATIENT
Start: 2023-11-08 | End: 2023-11-08 | Stop reason: HOSPADM

## 2023-11-08 RX ORDER — FENTANYL CITRATE 50 UG/ML
25 INJECTION, SOLUTION INTRAMUSCULAR; INTRAVENOUS EVERY 5 MIN PRN
Status: DISCONTINUED | OUTPATIENT
Start: 2023-11-08 | End: 2023-11-08 | Stop reason: HOSPADM

## 2023-11-08 RX ORDER — KETAMINE HCL IN NACL, ISO-OSM 100MG/10ML
SYRINGE (ML) INJECTION PRN
Status: DISCONTINUED | OUTPATIENT
Start: 2023-11-08 | End: 2023-11-08 | Stop reason: SDUPTHER

## 2023-11-08 RX ORDER — FENTANYL CITRATE 50 UG/ML
INJECTION, SOLUTION INTRAMUSCULAR; INTRAVENOUS PRN
Status: DISCONTINUED | OUTPATIENT
Start: 2023-11-08 | End: 2023-11-08 | Stop reason: SDUPTHER

## 2023-11-08 RX ORDER — ENOXAPARIN SODIUM 100 MG/ML
40 INJECTION SUBCUTANEOUS DAILY
Status: DISCONTINUED | OUTPATIENT
Start: 2023-11-09 | End: 2023-11-10 | Stop reason: HOSPADM

## 2023-11-08 RX ORDER — ONDANSETRON 2 MG/ML
4 INJECTION INTRAMUSCULAR; INTRAVENOUS EVERY 6 HOURS PRN
Status: DISCONTINUED | OUTPATIENT
Start: 2023-11-08 | End: 2023-11-10 | Stop reason: HOSPADM

## 2023-11-08 RX ORDER — SODIUM CHLORIDE, SODIUM LACTATE, POTASSIUM CHLORIDE, CALCIUM CHLORIDE 600; 310; 30; 20 MG/100ML; MG/100ML; MG/100ML; MG/100ML
INJECTION, SOLUTION INTRAVENOUS CONTINUOUS
Status: DISCONTINUED | OUTPATIENT
Start: 2023-11-08 | End: 2023-11-10 | Stop reason: HOSPADM

## 2023-11-08 RX ORDER — ONDANSETRON 4 MG/1
4 TABLET, ORALLY DISINTEGRATING ORAL EVERY 8 HOURS PRN
Status: DISCONTINUED | OUTPATIENT
Start: 2023-11-08 | End: 2023-11-10 | Stop reason: HOSPADM

## 2023-11-08 RX ORDER — DEXAMETHASONE SODIUM PHOSPHATE 4 MG/ML
INJECTION, SOLUTION INTRA-ARTICULAR; INTRALESIONAL; INTRAMUSCULAR; INTRAVENOUS; SOFT TISSUE PRN
Status: DISCONTINUED | OUTPATIENT
Start: 2023-11-08 | End: 2023-11-08 | Stop reason: SDUPTHER

## 2023-11-08 RX ORDER — EPHEDRINE SULFATE 50 MG/ML
INJECTION INTRAVENOUS PRN
Status: DISCONTINUED | OUTPATIENT
Start: 2023-11-08 | End: 2023-11-08 | Stop reason: SDUPTHER

## 2023-11-08 RX ORDER — SCOLOPAMINE TRANSDERMAL SYSTEM 1 MG/1
1 PATCH, EXTENDED RELEASE TRANSDERMAL
Status: DISCONTINUED | OUTPATIENT
Start: 2023-11-08 | End: 2023-11-10 | Stop reason: HOSPADM

## 2023-11-08 RX ORDER — LIDOCAINE HYDROCHLORIDE 10 MG/ML
1 INJECTION, SOLUTION EPIDURAL; INFILTRATION; INTRACAUDAL; PERINEURAL
Status: COMPLETED | OUTPATIENT
Start: 2023-11-08 | End: 2023-11-08

## 2023-11-08 RX ORDER — NEOSTIGMINE METHYLSULFATE 1 MG/ML
INJECTION, SOLUTION INTRAVENOUS PRN
Status: DISCONTINUED | OUTPATIENT
Start: 2023-11-08 | End: 2023-11-08 | Stop reason: SDUPTHER

## 2023-11-08 RX ORDER — METRONIDAZOLE 500 MG/100ML
500 INJECTION, SOLUTION INTRAVENOUS
Status: COMPLETED | OUTPATIENT
Start: 2023-11-08 | End: 2023-11-08

## 2023-11-08 RX ORDER — ACETAMINOPHEN 160 MG/5ML
650 LIQUID ORAL EVERY 4 HOURS PRN
Status: DISCONTINUED | OUTPATIENT
Start: 2023-11-08 | End: 2023-11-10 | Stop reason: HOSPADM

## 2023-11-08 RX ORDER — MIDAZOLAM HYDROCHLORIDE 1 MG/ML
INJECTION INTRAMUSCULAR; INTRAVENOUS PRN
Status: DISCONTINUED | OUTPATIENT
Start: 2023-11-08 | End: 2023-11-08 | Stop reason: SDUPTHER

## 2023-11-08 RX ORDER — FENTANYL CITRATE 50 UG/ML
100 INJECTION, SOLUTION INTRAMUSCULAR; INTRAVENOUS
Status: DISCONTINUED | OUTPATIENT
Start: 2023-11-08 | End: 2023-11-08 | Stop reason: HOSPADM

## 2023-11-08 RX ORDER — MIDAZOLAM HYDROCHLORIDE 2 MG/2ML
2 INJECTION, SOLUTION INTRAMUSCULAR; INTRAVENOUS
Status: DISCONTINUED | OUTPATIENT
Start: 2023-11-08 | End: 2023-11-08 | Stop reason: HOSPADM

## 2023-11-08 RX ORDER — GLYCOPYRROLATE 0.2 MG/ML
INJECTION INTRAMUSCULAR; INTRAVENOUS PRN
Status: DISCONTINUED | OUTPATIENT
Start: 2023-11-08 | End: 2023-11-08 | Stop reason: SDUPTHER

## 2023-11-08 RX ORDER — LORAZEPAM 2 MG/ML
1 CONCENTRATE ORAL EVERY 8 HOURS PRN
Status: DISCONTINUED | OUTPATIENT
Start: 2023-11-08 | End: 2023-11-10 | Stop reason: HOSPADM

## 2023-11-08 RX ORDER — LIDOCAINE HYDROCHLORIDE 20 MG/ML
INJECTION, SOLUTION EPIDURAL; INFILTRATION; INTRACAUDAL; PERINEURAL PRN
Status: DISCONTINUED | OUTPATIENT
Start: 2023-11-08 | End: 2023-11-08 | Stop reason: SDUPTHER

## 2023-11-08 RX ORDER — ONDANSETRON 2 MG/ML
4 INJECTION INTRAMUSCULAR; INTRAVENOUS ONCE
Status: DISCONTINUED | OUTPATIENT
Start: 2023-11-08 | End: 2023-11-08 | Stop reason: HOSPADM

## 2023-11-08 RX ORDER — SODIUM CHLORIDE 9 MG/ML
INJECTION, SOLUTION INTRAVENOUS PRN
Status: DISCONTINUED | OUTPATIENT
Start: 2023-11-08 | End: 2023-11-10 | Stop reason: HOSPADM

## 2023-11-08 RX ADMIN — Medication 80 MCG: at 12:41

## 2023-11-08 RX ADMIN — Medication 125 MG: at 21:47

## 2023-11-08 RX ADMIN — ROCURONIUM BROMIDE 20 MG: 10 SOLUTION INTRAVENOUS at 13:49

## 2023-11-08 RX ADMIN — PROPOFOL 100 MG: 10 INJECTION, EMULSION INTRAVENOUS at 12:21

## 2023-11-08 RX ADMIN — ROCURONIUM BROMIDE 10 MG: 10 SOLUTION INTRAVENOUS at 12:18

## 2023-11-08 RX ADMIN — SODIUM CHLORIDE 3000 MG: 900 INJECTION INTRAVENOUS at 12:25

## 2023-11-08 RX ADMIN — Medication 120 MCG: at 12:44

## 2023-11-08 RX ADMIN — LIDOCAINE HYDROCHLORIDE 100 MG: 20 INJECTION, SOLUTION EPIDURAL; INFILTRATION; INTRACAUDAL; PERINEURAL at 12:18

## 2023-11-08 RX ADMIN — Medication 30 MG: at 13:19

## 2023-11-08 RX ADMIN — EPHEDRINE SULFATE 10 MG: 50 INJECTION INTRAVENOUS at 13:26

## 2023-11-08 RX ADMIN — ROCURONIUM BROMIDE 10 MG: 10 SOLUTION INTRAVENOUS at 14:02

## 2023-11-08 RX ADMIN — SODIUM CHLORIDE, POTASSIUM CHLORIDE, SODIUM LACTATE AND CALCIUM CHLORIDE: 600; 310; 30; 20 INJECTION, SOLUTION INTRAVENOUS at 12:06

## 2023-11-08 RX ADMIN — Medication 80 MCG: at 13:03

## 2023-11-08 RX ADMIN — ONDANSETRON HYDROCHLORIDE 4 MG: 2 INJECTION, SOLUTION INTRAMUSCULAR; INTRAVENOUS at 15:18

## 2023-11-08 RX ADMIN — ROCURONIUM BROMIDE 10 MG: 10 SOLUTION INTRAVENOUS at 14:40

## 2023-11-08 RX ADMIN — ROCURONIUM BROMIDE 10 MG: 10 SOLUTION INTRAVENOUS at 15:12

## 2023-11-08 RX ADMIN — ROCURONIUM BROMIDE 20 MG: 10 SOLUTION INTRAVENOUS at 12:30

## 2023-11-08 RX ADMIN — ROCURONIUM BROMIDE 10 MG: 10 SOLUTION INTRAVENOUS at 14:05

## 2023-11-08 RX ADMIN — ALBUMIN (HUMAN) 250 ML: 12.5 INJECTION, SOLUTION INTRAVENOUS at 12:45

## 2023-11-08 RX ADMIN — EPHEDRINE SULFATE 10 MG: 50 INJECTION INTRAVENOUS at 12:45

## 2023-11-08 RX ADMIN — Medication 20 MG: at 14:56

## 2023-11-08 RX ADMIN — EPHEDRINE SULFATE 10 MG: 50 INJECTION INTRAVENOUS at 13:24

## 2023-11-08 RX ADMIN — Medication 80 MCG: at 14:32

## 2023-11-08 RX ADMIN — MIDAZOLAM 2 MG: 1 INJECTION INTRAMUSCULAR; INTRAVENOUS at 12:06

## 2023-11-08 RX ADMIN — HYDROMORPHONE HYDROCHLORIDE 1 MG: 1 INJECTION, SOLUTION INTRAMUSCULAR; INTRAVENOUS; SUBCUTANEOUS at 21:47

## 2023-11-08 RX ADMIN — PROPOFOL 100 MG: 10 INJECTION, EMULSION INTRAVENOUS at 12:23

## 2023-11-08 RX ADMIN — LIDOCAINE HYDROCHLORIDE 1 ML: 10 INJECTION, SOLUTION EPIDURAL; INFILTRATION; INTRACAUDAL; PERINEURAL at 10:58

## 2023-11-08 RX ADMIN — PROPOFOL 200 MG: 10 INJECTION, EMULSION INTRAVENOUS at 12:18

## 2023-11-08 RX ADMIN — Medication 80 MCG: at 12:37

## 2023-11-08 RX ADMIN — FENTANYL CITRATE 100 MCG: 50 INJECTION, SOLUTION INTRAMUSCULAR; INTRAVENOUS at 12:18

## 2023-11-08 RX ADMIN — METOPROLOL TARTRATE 5 MG: 5 INJECTION, SOLUTION INTRAVENOUS at 18:17

## 2023-11-08 RX ADMIN — HYDROMORPHONE HYDROCHLORIDE 1 MG: 1 INJECTION, SOLUTION INTRAMUSCULAR; INTRAVENOUS; SUBCUTANEOUS at 18:16

## 2023-11-08 RX ADMIN — EPHEDRINE SULFATE 10 MG: 50 INJECTION INTRAVENOUS at 14:05

## 2023-11-08 RX ADMIN — METOPROLOL TARTRATE 5 MG: 5 INJECTION, SOLUTION INTRAVENOUS at 23:31

## 2023-11-08 RX ADMIN — Medication 2 MG: at 15:32

## 2023-11-08 RX ADMIN — HYDROMORPHONE HYDROCHLORIDE 0.6 MG: 2 INJECTION, SOLUTION INTRAMUSCULAR; INTRAVENOUS; SUBCUTANEOUS at 15:36

## 2023-11-08 RX ADMIN — METRONIDAZOLE 500 MG: 5 INJECTION, SOLUTION INTRAVENOUS at 12:30

## 2023-11-08 RX ADMIN — Medication 80 MCG: at 13:05

## 2023-11-08 RX ADMIN — LIDOCAINE HYDROCHLORIDE ANHYDROUS AND DEXTROSE MONOHYDRATE 2 MG/KG/HR: .8; 5 INJECTION, SOLUTION INTRAVENOUS at 12:25

## 2023-11-08 RX ADMIN — ROCURONIUM BROMIDE 10 MG: 10 SOLUTION INTRAVENOUS at 13:12

## 2023-11-08 RX ADMIN — SODIUM CHLORIDE, POTASSIUM CHLORIDE, SODIUM LACTATE AND CALCIUM CHLORIDE: 600; 310; 30; 20 INJECTION, SOLUTION INTRAVENOUS at 10:59

## 2023-11-08 RX ADMIN — PHENYLEPHRINE HYDROCHLORIDE 80 MCG/MIN: 10 INJECTION INTRAVENOUS at 14:34

## 2023-11-08 RX ADMIN — Medication 125 MG: at 10:32

## 2023-11-08 RX ADMIN — Medication 160 MG: at 12:18

## 2023-11-08 RX ADMIN — HYDROMORPHONE HYDROCHLORIDE 0.4 MG: 2 INJECTION, SOLUTION INTRAMUSCULAR; INTRAVENOUS; SUBCUTANEOUS at 15:31

## 2023-11-08 RX ADMIN — HYDROMORPHONE HYDROCHLORIDE 0.4 MG: 2 INJECTION, SOLUTION INTRAMUSCULAR; INTRAVENOUS; SUBCUTANEOUS at 15:35

## 2023-11-08 RX ADMIN — Medication 80 MCG: at 12:35

## 2023-11-08 RX ADMIN — SODIUM CHLORIDE, POTASSIUM CHLORIDE, SODIUM LACTATE AND CALCIUM CHLORIDE: 600; 310; 30; 20 INJECTION, SOLUTION INTRAVENOUS at 14:06

## 2023-11-08 RX ADMIN — Medication 80 MCG: at 12:39

## 2023-11-08 RX ADMIN — ACETAMINOPHEN 650 MG: 650 SOLUTION ORAL at 10:32

## 2023-11-08 RX ADMIN — EPHEDRINE SULFATE 10 MG: 50 INJECTION INTRAVENOUS at 12:49

## 2023-11-08 RX ADMIN — Medication 120 MCG: at 12:31

## 2023-11-08 RX ADMIN — HYDROMORPHONE HYDROCHLORIDE 0.6 MG: 2 INJECTION, SOLUTION INTRAMUSCULAR; INTRAVENOUS; SUBCUTANEOUS at 15:39

## 2023-11-08 RX ADMIN — Medication 80 MCG: at 13:06

## 2023-11-08 RX ADMIN — DEXAMETHASONE SODIUM PHOSPHATE 4 MG: 4 INJECTION, SOLUTION INTRAMUSCULAR; INTRAVENOUS at 13:30

## 2023-11-08 RX ADMIN — GLYCOPYRROLATE 0.4 MG: 0.2 INJECTION INTRAMUSCULAR; INTRAVENOUS at 15:32

## 2023-11-08 ASSESSMENT — PAIN DESCRIPTION - LOCATION
LOCATION: ABDOMEN

## 2023-11-08 ASSESSMENT — PAIN DESCRIPTION - ORIENTATION
ORIENTATION: LEFT;LOWER;MID
ORIENTATION: ANTERIOR
ORIENTATION: ANTERIOR

## 2023-11-08 ASSESSMENT — PAIN SCALES - GENERAL
PAINLEVEL_OUTOF10: 8
PAINLEVEL_OUTOF10: 8
PAINLEVEL_OUTOF10: 4

## 2023-11-08 ASSESSMENT — PAIN DESCRIPTION - DESCRIPTORS
DESCRIPTORS: ACHING
DESCRIPTORS: ACHING
DESCRIPTORS: ACHING;SORE;SHARP

## 2023-11-08 ASSESSMENT — ENCOUNTER SYMPTOMS: SHORTNESS OF BREATH: 1

## 2023-11-08 ASSESSMENT — PAIN DESCRIPTION - PAIN TYPE: TYPE: SURGICAL PAIN

## 2023-11-08 ASSESSMENT — PAIN - FUNCTIONAL ASSESSMENT: PAIN_FUNCTIONAL_ASSESSMENT: 0-10

## 2023-11-08 NOTE — ANESTHESIA PRE PROCEDURE
Department of Anesthesiology  Preprocedure Note       Name:  Gisell Wakefield   Age:  41 y.o.  :  1985                                          MRN:  [id number]         Date:  2023      Surgeon: Toshia Chavira):  Chele Childs MD    Procedure: Procedure(s):  ROBOTIC ASSISTED LAPAROSCOPIC GASTRIC BYPASS WITH EGD  (E R A S)    Medications prior to admission:   Prior to Admission medications    Medication Sig Start Date End Date Taking? Authorizing Provider   polyethylene glycol (GLYCOLAX) 17 GM/SCOOP powder Take 17 g by mouth daily  Patient not taking: Reported on 10/30/2023 10/18/23 1/16/24  YASMEEN Carnes NP   ondansetron [facility]) 4 MG tablet Take 1 tablet by mouth every 8 hours as needed for Nausea or Vomiting  Patient not taking: Reported on 10/30/2023 10/18/23   YASMEEN Carnes NP   gabapentin (NEURONTIN) 100 MG capsule Take 1-2 capsules by mouth 3 times daily for 5 days.  Max Daily Amount: 600 mg 10/18/23 10/23/23  YASMEEN Carnes NP   omeprazole (PRILOSEC) 40 MG delayed release capsule Take 1 capsule by mouth every morning (before breakfast)  Patient not taking: Reported on 10/30/2023 10/18/23   YASMEEN Carnes NP   enoxaparin (LOVENOX) 40 MG/0.4ML Inject 0.4 mLs into the skin daily  Patient not taking: Reported on 10/30/2023 11/8/23   YASMEEN Carnes NP   amLODIPine (NORVASC) 10 MG tablet TAKE 1 TABLET BY MOUTH DAILY  Patient taking differently: Take 1 tablet by mouth nightly 23   Radha Romero MD   hydrOXYzine HCl (ATARAX) 50 MG tablet TAKE 1 TABLET BY MOUTH DAILY AS NEEDED FOR ANXIETY 23   Radha Romero MD   vitamin D (CHOLECALCIFEROL) 50 MCG (2000) CAPS capsule Take 1 capsule by mouth 2 times daily 23   YASMEEN Thomas NP   sertraline (ZOLOFT) 100 MG tablet Take 2 tablets by mouth daily 7/25/23 10/23/23  YASMEEN Thomas - NP   Multiple Vitamins-Minerals (ONE-A-DAY WOMENS PO) Take 1 tablet by mouth every morning  Patient not taking:

## 2023-11-08 NOTE — BRIEF OP NOTE
Brief Postoperative Note      Patient: Malachi Ayers  YOB: 1985  MRN: [id number]    Date of Procedure: 11/8/2023    Pre-Op Diagnosis Codes:     * Morbid obesity ([address]) [E66.01]     * Essential hypertension, malignant [I10]     * Mixed hyperlipidemia [E78.2]     * Gastroesophageal reflux disease, unspecified whether esophagitis present [K21.9]    Post-Op Diagnosis: Same       Procedure(s):  ROBOTIC ASSISTED LAPAROSCOPIC GASTRIC BYPASS WITH EGD  (E R A S)    Surgeon(s):  Krys Torres MD    Assistant:  Physician Assistant: Keller Crigler, PA    Anesthesia: General    Estimated Blood Loss (mL): less than 50     Complications: None    Specimens:   * No specimens in log *    Implants:  Implant Name Type Inv. Item Serial No.  Lot No. LRB No. Used Action    Mastodon CELON ENDOPATH STAPLE LINE REINFORCEMENT   NA  [id number] N/A 7 Implanted         Drains:   Closed/Suction Drain Lateral LLQ Bulb (Active)   Site Description Clean, dry & intact 11/08/23 1543   Dressing Status New dressing applied 11/08/23 1543   Drain Status To bulb suction 11/08/23 1543       Findings: Creation of a 35 cc gastric pouch with 227 cm antecolic, antigastric Hector limb. Handsewn gastrojejunostomy. No intraluminal hemorrhage or insufflation air leak on upper endoscopy.       Electronically signed by Krys Torres MD on 11/8/2023 at 3:48 PM

## 2023-11-08 NOTE — PERIOP NOTE
End of shift.

Pt resting in bed, no severe abdominal pain noted. Pt able to use restroom independently. IV 
in LFA intact and patent infusing IVPB well. Pt prefers to wear her own clothes and not wear 
hospital gown at this time. No SOB/resp distress or pain/discomfort was noted. Bed in low 
position and side rails raised. Pt maintained with safety precautions all shift. Call light 
within reach. UPDATE WITH PATIENT'S  (Shekhar Deal) AT [phone].

## 2023-11-08 NOTE — ANESTHESIA POSTPROCEDURE EVALUATION
Department of Anesthesiology  Postprocedure Note    Patient: Yvette Briceno  MRN: [id number]  YOB: 1985  Date of evaluation: 11/8/2023      Procedure Summary     Date: 11/08/23 Room / Location: [address] MAIN OR 05 / [address] MAIN OR    Anesthesia Start: 1206 Anesthesia Stop: 1559    Procedure: ROBOTIC ASSISTED LAPAROSCOPIC GASTRIC BYPASS WITH EGD  (E R A S) (Abdomen) Diagnosis:       Morbid obesity ([address])      Essential hypertension, malignant      Mixed hyperlipidemia      Gastroesophageal reflux disease, unspecified whether esophagitis present      (Morbid obesity ([address]) [E66.01])      (Essential hypertension, malignant [I10])      (Mixed hyperlipidemia [E78.2])      (Gastroesophageal reflux disease, unspecified whether esophagitis present [K21.9])    Providers: Pasquale Groves MD Responsible Provider: Jeremiah Womack DO    Anesthesia Type: general ASA Status: 3          Anesthesia Type: No value filed.     Luis M Phase I: Luis M Score: 8    Luis M Phase II:        Anesthesia Post Evaluation    Patient location during evaluation: PACU  Patient participation: complete - patient participated  Level of consciousness: awake  Pain score: 0  Airway patency: patent  Nausea & Vomiting: no nausea  Complications: no  Cardiovascular status: hemodynamically stable  Respiratory status: acceptable  Hydration status: euvolemic  Comments: Seen, no complaints   Pain management: adequate

## 2023-11-09 LAB
ANION GAP SERPL CALC-SCNC: 6 MMOL/L (ref 5–15)
BASOPHILS # BLD: 0 K/UL (ref 0–0.1)
BASOPHILS NFR BLD: 0 % (ref 0–1)
BUN SERPL-MCNC: 8 MG/DL (ref 6–20)
BUN/CREAT SERPL: 12 (ref 12–20)
CALCIUM SERPL-MCNC: 8.5 MG/DL (ref 8.5–10.1)
CHLORIDE SERPL-SCNC: 108 MMOL/L (ref 97–108)
CO2 SERPL-SCNC: 24 MMOL/L (ref 21–32)
CREAT SERPL-MCNC: 0.68 MG/DL (ref 0.55–1.02)
DIFFERENTIAL METHOD BLD: ABNORMAL
EOSINOPHIL # BLD: 0 K/UL (ref 0–0.4)
EOSINOPHIL NFR BLD: 0 % (ref 0–7)
ERYTHROCYTE [DISTWIDTH] IN BLOOD BY AUTOMATED COUNT: 13.6 % (ref 11.5–14.5)
GLUCOSE SERPL-MCNC: 112 MG/DL (ref 65–100)
HCT VFR BLD AUTO: 35.1 % (ref 35–47)
HGB BLD-MCNC: 11.4 G/DL (ref 11.5–16)
IMM GRANULOCYTES # BLD AUTO: 0 K/UL (ref 0–0.04)
IMM GRANULOCYTES NFR BLD AUTO: 0 % (ref 0–0.5)
LYMPHOCYTES # BLD: 1.6 K/UL (ref 0.8–3.5)
LYMPHOCYTES NFR BLD: 14 % (ref 12–49)
MCH RBC QN AUTO: 29.5 PG (ref 26–34)
MCHC RBC AUTO-ENTMCNC: 32.5 G/DL (ref 30–36.5)
MCV RBC AUTO: 90.9 FL (ref 80–99)
MONOCYTES # BLD: 0.8 K/UL (ref 0–1)
MONOCYTES NFR BLD: 7 % (ref 5–13)
NEUTS SEG # BLD: 9.1 K/UL (ref 1.8–8)
NEUTS SEG NFR BLD: 79 % (ref 32–75)
NRBC # BLD: 0 K/UL (ref 0–0.01)
NRBC BLD-RTO: 0 PER 100 WBC
PLATELET # BLD AUTO: 193 K/UL (ref 150–400)
PMV BLD AUTO: 10.6 FL (ref 8.9–12.9)
POTASSIUM SERPL-SCNC: 4 MMOL/L (ref 3.5–5.1)
RBC # BLD AUTO: 3.86 M/UL (ref 3.8–5.2)
SODIUM SERPL-SCNC: 138 MMOL/L (ref 136–145)
WBC # BLD AUTO: 11.6 K/UL (ref 3.6–11)

## 2023-11-09 PROCEDURE — 6360000002 HC RX W HCPCS: Performed by: SURGERY

## 2023-11-09 PROCEDURE — 6370000000 HC RX 637 (ALT 250 FOR IP): Performed by: SURGERY

## 2023-11-09 PROCEDURE — 1100000000 HC RM PRIVATE

## 2023-11-09 PROCEDURE — 2580000003 HC RX 258: Performed by: SURGERY

## 2023-11-09 PROCEDURE — 94760 N-INVAS EAR/PLS OXIMETRY 1: CPT

## 2023-11-09 PROCEDURE — 80048 BASIC METABOLIC PNL TOTAL CA: CPT

## 2023-11-09 PROCEDURE — 36415 COLL VENOUS BLD VENIPUNCTURE: CPT

## 2023-11-09 PROCEDURE — 85025 COMPLETE CBC W/AUTO DIFF WBC: CPT

## 2023-11-09 RX ORDER — AMLODIPINE BESYLATE 5 MG/1
10 TABLET ORAL DAILY
Status: DISCONTINUED | OUTPATIENT
Start: 2023-11-09 | End: 2023-11-10 | Stop reason: HOSPADM

## 2023-11-09 RX ORDER — METOPROLOL SUCCINATE 25 MG/1
25 TABLET, EXTENDED RELEASE ORAL DAILY
Status: DISCONTINUED | OUTPATIENT
Start: 2023-11-09 | End: 2023-11-10 | Stop reason: HOSPADM

## 2023-11-09 RX ORDER — KETOROLAC TROMETHAMINE 30 MG/ML
15 INJECTION, SOLUTION INTRAMUSCULAR; INTRAVENOUS EVERY 6 HOURS
Status: COMPLETED | OUTPATIENT
Start: 2023-11-09 | End: 2023-11-10

## 2023-11-09 RX ORDER — ATORVASTATIN CALCIUM 10 MG/1
10 TABLET, FILM COATED ORAL DAILY
Status: DISCONTINUED | OUTPATIENT
Start: 2023-11-09 | End: 2023-11-10 | Stop reason: HOSPADM

## 2023-11-09 RX ADMIN — HYDROMORPHONE HYDROCHLORIDE 1 MG: 1 INJECTION, SOLUTION INTRAMUSCULAR; INTRAVENOUS; SUBCUTANEOUS at 21:32

## 2023-11-09 RX ADMIN — ENOXAPARIN SODIUM 40 MG: 100 INJECTION SUBCUTANEOUS at 09:31

## 2023-11-09 RX ADMIN — KETOROLAC TROMETHAMINE 15 MG: 30 INJECTION, SOLUTION INTRAMUSCULAR; INTRAVENOUS at 23:21

## 2023-11-09 RX ADMIN — SODIUM CHLORIDE, POTASSIUM CHLORIDE, SODIUM LACTATE AND CALCIUM CHLORIDE: 600; 310; 30; 20 INJECTION, SOLUTION INTRAVENOUS at 01:32

## 2023-11-09 RX ADMIN — SODIUM CHLORIDE, POTASSIUM CHLORIDE, SODIUM LACTATE AND CALCIUM CHLORIDE: 600; 310; 30; 20 INJECTION, SOLUTION INTRAVENOUS at 17:12

## 2023-11-09 RX ADMIN — KETOROLAC TROMETHAMINE 15 MG: 30 INJECTION, SOLUTION INTRAMUSCULAR; INTRAVENOUS at 17:06

## 2023-11-09 RX ADMIN — Medication 125 MG: at 05:45

## 2023-11-09 RX ADMIN — HYDROMORPHONE HYDROCHLORIDE 1 MG: 1 INJECTION, SOLUTION INTRAMUSCULAR; INTRAVENOUS; SUBCUTANEOUS at 09:42

## 2023-11-09 RX ADMIN — SODIUM CHLORIDE, POTASSIUM CHLORIDE, SODIUM LACTATE AND CALCIUM CHLORIDE: 600; 310; 30; 20 INJECTION, SOLUTION INTRAVENOUS at 09:28

## 2023-11-09 RX ADMIN — SERTRALINE HYDROCHLORIDE 200 MG: 50 TABLET ORAL at 09:29

## 2023-11-09 RX ADMIN — KETOROLAC TROMETHAMINE 15 MG: 30 INJECTION, SOLUTION INTRAMUSCULAR; INTRAVENOUS at 12:27

## 2023-11-09 RX ADMIN — AMLODIPINE BESYLATE 10 MG: 5 TABLET ORAL at 09:30

## 2023-11-09 RX ADMIN — HYDROMORPHONE HYDROCHLORIDE 1 MG: 1 INJECTION, SOLUTION INTRAMUSCULAR; INTRAVENOUS; SUBCUTANEOUS at 01:31

## 2023-11-09 RX ADMIN — Medication 125 MG: at 21:30

## 2023-11-09 RX ADMIN — Medication 125 MG: at 17:06

## 2023-11-09 RX ADMIN — HYDROMORPHONE HYDROCHLORIDE 1 MG: 1 INJECTION, SOLUTION INTRAMUSCULAR; INTRAVENOUS; SUBCUTANEOUS at 05:48

## 2023-11-09 RX ADMIN — ATORVASTATIN CALCIUM 10 MG: 10 TABLET, FILM COATED ORAL at 09:30

## 2023-11-09 RX ADMIN — KETOROLAC TROMETHAMINE 15 MG: 30 INJECTION, SOLUTION INTRAMUSCULAR; INTRAVENOUS at 05:45

## 2023-11-09 RX ADMIN — METOPROLOL SUCCINATE 25 MG: 25 TABLET, EXTENDED RELEASE ORAL at 09:30

## 2023-11-09 RX ADMIN — SODIUM CHLORIDE, PRESERVATIVE FREE 10 ML: 5 INJECTION INTRAVENOUS at 12:27

## 2023-11-09 ASSESSMENT — PAIN DESCRIPTION - LOCATION
LOCATION: ABDOMEN
LOCATION: ABDOMEN

## 2023-11-09 ASSESSMENT — PAIN DESCRIPTION - ORIENTATION
ORIENTATION: LEFT
ORIENTATION: LOWER

## 2023-11-09 ASSESSMENT — PAIN DESCRIPTION - DESCRIPTORS
DESCRIPTORS: ACHING;SORE
DESCRIPTORS: ACHING

## 2023-11-09 ASSESSMENT — PAIN SCALES - GENERAL
PAINLEVEL_OUTOF10: 8
PAINLEVEL_OUTOF10: 7
PAINLEVEL_OUTOF10: 6

## 2023-11-09 NOTE — OP NOTE
through these openings, a 60-mm white load linear stapler was carefully inserted. After confirming correct insertion and orientation of the stapler, the stapler was closed, fired, and removed. The staple line was noted to be hemostatic. The remaining opening was closed with a running 3-0 PDS suture. The mesenteric defect at the jejunojejunostomy was closed with a running 2-0 Ethibond suture. Parnell's space was closed with a running 0 nonabsorbable V-Loc suture. A hitch suture was placed between the antimesenteric border of the Hector limb and the excluded stomach. Remaining needles and suture strands were then removed. Plans were made for intraoperative upper endoscopy. The Hector limb was manually clamped and sterile saline was instilled into the upper abdomen. Intraoperative upper endoscopy was performed. The gastroscope was passed transorally, through the gastroesophageal junction, and into the gastric pouch. With insufflation using the gastroscope, adequate pouch and Hector limb distention were noted. No intraluminal hemorrhage was identified. No insufflation of air leak occurred. The anastomosis was hemostatic and patent. The bowel was decompressed and the gastroscope was removed. Sterile saline was evacuated from the upper abdomen. The Hector limb was uncompressed and after confirming satisfactory hemostasis, remaining instruments were removed and the patient cart was undocked. Remaining sterile saline was evacuated from the upper abdomen. A 19-mm Saran drain was inserted into the abdominal space. It was allowed to lie adjacent to the gastrojejunal anastomosis and brought out through the far left subcostal 5-mm trocar site. It was secured to the skin with a 2-0 nylon suture. After confirming satisfactory hemostasis once again, the [facility] liver retractor was removed, followed by closure of the 12-mm fascial defect using a 0 Vicryl suture with a laparoscopic suture passer.   Pneumoperitoneum

## 2023-11-09 NOTE — DISCHARGE INSTRUCTIONS
[facility] at [facility]  Bariatric Surgery Discharge Instructions     Procedure Laparoscopic gastric bypass     Future Appointments   Date Time Provider [address]   11/20/2023 10:15 AM YASMEEN Tam NP BS AMB   11/24/2023  8:00 AM YASMEEN Giang  [address] BS AMB   12/7/2023  8:40 AM YASMEEN Giang  [address] BS AMB   12/20/2023  8:20 AM YASMEEN Giang  [address] BS AMB   5/30/2024  9:30 AM YASMEEN Oliva NP [facility] BS AMB         Contact Information:    [address] [facility] at [facility], [address], [address]  [phone]    After Hours and Weekends  [phone] On Call Surgeon    Non Emergent Medical Needs  Call during office hours or send a message via My Chart   (messages returned during business hours)    DIET    Please remember that you are on ONLY LIQUIDS for the first 2 weeks after surgery. Do not advance to the next phase until advised by your surgeon or Nurse Practitioner. Refer to the Bariatric Handbook for detailed information. TO PREVENT DEHYDRATION:  consume 48-64 ounces of liquids daily. At least 48 ounces of that should come from water, Crystal Light, sugar free popsicles, sugar free gelatin or other calorie-free, sugar-free, caffeine free and noncarbonated beverages. Do not drink with a straw. Sip, sip, sip throughout the day  Main priority is to stay hydrated  Aim for 60 grams of protein every day. Most of your protein will come from shakes. Refer to the Bariatric Handbook for detailed information.   Add additional protein supplements to meet protein needs (protein powder, clear protein such as protein water, non-fat dry milk powder, NO protein bars at this at this stage)     MEDICATIONS & VITAMINS    Pre-surgery medications should be reviewed with your Bariatric provider and taken as prescribed   Take no more than 2 pills at a time mg at one time. Take at least 2 hours before or after your multivitamin and/or iron supplement. Multivitamin containing Iron - 2 multivitamins with 100% Daily Value of Iron, Folic Acid and Thiamine   Vitamin D-3 - Take 3000 IU  per day  Vitamin B-12 - Oral or Sublingual: 350-500 mcg/day OR 1000 mcg Monthly intramuscular shot        ACTIVITY    Be active. Sit up as much as possible. Walk often. Walking and/or foot exercises will help prevent blood clots. Continue to sip liquids throughout the day  Continue to use your incentive spirometer 4 to 5 times per day  Keep your incisions clean and dry to prevent infection. Showering is ok. No submersion in water for 2 weeks (No tubs, pools, etc.)  Weight lifting restrictions:  10 lbs. for the first 2 weeks, 20 lbs. for the next 4 to 6 weeks    TOP REASONS TO CONTACT YOUR SURGEONS'S OFFICE    You have severe pain or discomfort unrelieved by pain medication. You have been vomiting for more than 24 hours. Call sooner if you are unable to drink any fluids. Temperature rises above 101 degrees. You have persistent nausea and/or vomiting. You are unable to swallow liquids   Increased swelling, redness, or drainage from your incision sites.

## 2023-11-09 NOTE — CONSULTS
Nutrition Education    Educated on Bariatric post-op diet  Learners: Patient  Readiness: Acceptance  Method: Explanation  Response: Verbalizes Understanding  Contact name and number provided.     Pat Roberts RD  Contact via [employer]

## 2023-11-10 VITALS
RESPIRATION RATE: 18 BRPM | HEIGHT: 64 IN | BODY MASS INDEX: 50.02 KG/M2 | WEIGHT: 293 LBS | TEMPERATURE: 98.4 F | DIASTOLIC BLOOD PRESSURE: 87 MMHG | OXYGEN SATURATION: 97 % | HEART RATE: 84 BPM | SYSTOLIC BLOOD PRESSURE: 140 MMHG

## 2023-11-10 LAB
ANION GAP SERPL CALC-SCNC: 4 MMOL/L (ref 5–15)
BASOPHILS # BLD: 0 K/UL (ref 0–0.1)
BASOPHILS NFR BLD: 0 % (ref 0–1)
BUN SERPL-MCNC: 9 MG/DL (ref 6–20)
BUN/CREAT SERPL: 15 (ref 12–20)
CALCIUM SERPL-MCNC: 8.2 MG/DL (ref 8.5–10.1)
CHLORIDE SERPL-SCNC: 111 MMOL/L (ref 97–108)
CO2 SERPL-SCNC: 25 MMOL/L (ref 21–32)
CREAT SERPL-MCNC: 0.61 MG/DL (ref 0.55–1.02)
DIFFERENTIAL METHOD BLD: ABNORMAL
EOSINOPHIL # BLD: 0.1 K/UL (ref 0–0.4)
EOSINOPHIL NFR BLD: 1 % (ref 0–7)
ERYTHROCYTE [DISTWIDTH] IN BLOOD BY AUTOMATED COUNT: 13.7 % (ref 11.5–14.5)
GLUCOSE SERPL-MCNC: 83 MG/DL (ref 65–100)
HCT VFR BLD AUTO: 33.7 % (ref 35–47)
HGB BLD-MCNC: 10.9 G/DL (ref 11.5–16)
IMM GRANULOCYTES # BLD AUTO: 0 K/UL (ref 0–0.04)
IMM GRANULOCYTES NFR BLD AUTO: 0 % (ref 0–0.5)
LYMPHOCYTES # BLD: 2.5 K/UL (ref 0.8–3.5)
LYMPHOCYTES NFR BLD: 31 % (ref 12–49)
MCH RBC QN AUTO: 29.5 PG (ref 26–34)
MCHC RBC AUTO-ENTMCNC: 32.3 G/DL (ref 30–36.5)
MCV RBC AUTO: 91.1 FL (ref 80–99)
MONOCYTES # BLD: 0.6 K/UL (ref 0–1)
MONOCYTES NFR BLD: 8 % (ref 5–13)
NEUTS SEG # BLD: 4.7 K/UL (ref 1.8–8)
NEUTS SEG NFR BLD: 60 % (ref 32–75)
NRBC # BLD: 0 K/UL (ref 0–0.01)
NRBC BLD-RTO: 0 PER 100 WBC
PLATELET # BLD AUTO: 166 K/UL (ref 150–400)
PMV BLD AUTO: 10.5 FL (ref 8.9–12.9)
POTASSIUM SERPL-SCNC: 3.7 MMOL/L (ref 3.5–5.1)
RBC # BLD AUTO: 3.7 M/UL (ref 3.8–5.2)
SODIUM SERPL-SCNC: 140 MMOL/L (ref 136–145)
WBC # BLD AUTO: 7.9 K/UL (ref 3.6–11)

## 2023-11-10 PROCEDURE — 85025 COMPLETE CBC W/AUTO DIFF WBC: CPT

## 2023-11-10 PROCEDURE — 6360000002 HC RX W HCPCS: Performed by: SURGERY

## 2023-11-10 PROCEDURE — 6370000000 HC RX 637 (ALT 250 FOR IP): Performed by: NURSE PRACTITIONER

## 2023-11-10 PROCEDURE — 6370000000 HC RX 637 (ALT 250 FOR IP): Performed by: SURGERY

## 2023-11-10 PROCEDURE — 36415 COLL VENOUS BLD VENIPUNCTURE: CPT

## 2023-11-10 PROCEDURE — 2580000003 HC RX 258: Performed by: SURGERY

## 2023-11-10 PROCEDURE — 80048 BASIC METABOLIC PNL TOTAL CA: CPT

## 2023-11-10 RX ORDER — HYDROMORPHONE HYDROCHLORIDE 2 MG/1
2 TABLET ORAL ONCE
Status: COMPLETED | OUTPATIENT
Start: 2023-11-10 | End: 2023-11-10

## 2023-11-10 RX ORDER — HYDROMORPHONE HYDROCHLORIDE 2 MG/1
2 TABLET ORAL EVERY 6 HOURS PRN
Qty: 25 TABLET | Refills: 0 | Status: SHIPPED | OUTPATIENT
Start: 2023-11-10 | End: 2023-11-17

## 2023-11-10 RX ADMIN — KETOROLAC TROMETHAMINE 15 MG: 30 INJECTION, SOLUTION INTRAMUSCULAR; INTRAVENOUS at 06:19

## 2023-11-10 RX ADMIN — ATORVASTATIN CALCIUM 10 MG: 10 TABLET, FILM COATED ORAL at 09:53

## 2023-11-10 RX ADMIN — HYDROMORPHONE HYDROCHLORIDE 2 MG: 2 TABLET ORAL at 10:01

## 2023-11-10 RX ADMIN — SODIUM CHLORIDE, POTASSIUM CHLORIDE, SODIUM LACTATE AND CALCIUM CHLORIDE: 600; 310; 30; 20 INJECTION, SOLUTION INTRAVENOUS at 00:58

## 2023-11-10 RX ADMIN — AMLODIPINE BESYLATE 10 MG: 5 TABLET ORAL at 09:52

## 2023-11-10 RX ADMIN — Medication 125 MG: at 06:19

## 2023-11-10 RX ADMIN — METOPROLOL SUCCINATE 25 MG: 25 TABLET, EXTENDED RELEASE ORAL at 09:53

## 2023-11-10 RX ADMIN — SERTRALINE HYDROCHLORIDE 200 MG: 50 TABLET ORAL at 09:53

## 2023-11-10 RX ADMIN — HYDROMORPHONE HYDROCHLORIDE 1 MG: 1 INJECTION, SOLUTION INTRAMUSCULAR; INTRAVENOUS; SUBCUTANEOUS at 04:16

## 2023-11-10 RX ADMIN — ENOXAPARIN SODIUM 40 MG: 100 INJECTION SUBCUTANEOUS at 09:55

## 2023-11-10 ASSESSMENT — PAIN SCALES - GENERAL
PAINLEVEL_OUTOF10: 7
PAINLEVEL_OUTOF10: 8

## 2023-11-10 ASSESSMENT — PAIN DESCRIPTION - DESCRIPTORS: DESCRIPTORS: CRAMPING

## 2023-11-10 ASSESSMENT — PAIN DESCRIPTION - LOCATION
LOCATION: ABDOMEN
LOCATION: ABDOMEN

## 2023-11-10 NOTE — DISCHARGE SUMMARY
Physician Discharge Summary     Patient ID:  Luanne Barragan  [id number]  89 y.o.  1985    Admit date: 11/8/2023    Discharge date and time: 11/10/2023    Admitting Physician: Terra Garcia MD     Admission Diagnoses: Morbid obesity ([address]) [E66.01]  Essential hypertension, malignant [I10]  Mixed hyperlipidemia [E78.2]  Gastroesophageal reflux disease, unspecified whether esophagitis present [K21.9]    Discharge Diagnoses: Same. Admission Condition: good    Discharged Condition: good    Procedure: robotic-assisted laparoscopic gastric bypass    Hospital Course: Normal hospital course for this procedure    Consults: none    Significant Diagnostic Studies: N/A    Disposition: home    In process/preliminary results:  Outstanding Order Results       No orders found from 10/10/2023 to 11/9/2023. Patient Instructions:   Current Discharge Medication List        START taking these medications    Details   HYDROmorphone (DILAUDID) 2 MG tablet Take 1 tablet by mouth every 6 hours as needed for Pain for up to 7 days. Max Daily Amount: 8 mg  Qty: 25 tablet, Refills: 0    Comments: Reduce doses taken as pain becomes manageable  Associated Diagnoses: S/P gastric bypass           CONTINUE these medications which have NOT CHANGED    Details   polyethylene glycol (GLYCOLAX) 17 GM/SCOOP powder Take 17 g by mouth daily  Qty: 1530 g, Refills: 0      ondansetron (ZOFRAN) 4 MG tablet Take 1 tablet by mouth every 8 hours as needed for Nausea or Vomiting  Qty: 30 tablet, Refills: 0      gabapentin (NEURONTIN) 100 MG capsule Take 1-2 capsules by mouth 3 times daily for 5 days.  Max Daily Amount: 600 mg  Qty: 30 capsule, Refills: 0    Associated Diagnoses: Post-op pain      omeprazole (PRILOSEC) 40 MG delayed release capsule Take 1 capsule by mouth every morning (before breakfast)  Qty: 90 capsule, Refills: 1      enoxaparin (LOVENOX) 40 MG/0.4ML Inject 0.4 mLs into the skin daily  Qty: 14 each, Refills: 0      amLODIPine

## 2023-11-10 NOTE — PROGRESS NOTES
Reviewed the discharge instructions with the patient and family member. Removed MELANIE. Provided opportunity for questions.

## 2023-11-13 ENCOUNTER — TELEPHONE (OUTPATIENT)
Age: 38
End: 2023-11-13

## 2023-11-13 NOTE — TELEPHONE ENCOUNTER
Bariatric Post Op Call 48 hour    Hydration: Less than 32 ounces of water daily is fair to poor (Goal is 64 ounces per day)  Poor [] Fair []  Good [x] Great []    Amount: getting in 40 ounces so far will work on increasing    Comment:     Ambulation:( walking throughout the day, at least every 2 hours while awake. Patient should be up and out of bed most of the day.)   Poor [] Fair []  Good [] Great [x]    Comment:    Urine Color: Question of any odor and color (should be shanae, pale, and clear)   Dark [] Shanae [] Pale [x] Clear []    Comment:    Diet: Question any nausea and/or vomiting. Protein intake (ultimately goal is 60 grams of protein daily, but at 2 days post op they should be working towards this and may not be at goal yet)  Poor [] Fair []  Good [x] Great []    Comment: getting in 25 grams ( one shake) will work on increasing      Bowel movements: Question of any constipation- haven't had any bowel movements for more than 3 days. This could be related to protein intake and/or narcotic pain medication usage. Comment: moving her bowels         Use of incentive spirometer:  Yes [x]  No []    Comment: 2000    Incision: (No redness, pain, swelling or fever)     Healing Well [x] Redness [] Pain [] Drainage [] Swelling []    Comment:     Pain: Right sided incisional (usually the largest incision with deep stitch) abdominal pain is normal (should be less than 3). Pain 0 - 10: 5    Comment (are they taking pain medication and is it helping?  Abdominal support / splinting/ ice or heat?): using pain medication only takes at night, none during the day, discussed heat and supportive clothing    Referred to provider: no     Next appointment: 11/24/23      Red Flags = prompt referral to a bariatric team provider for follow up    Fever > 101  Vomiting and not tolerating liquids   Weak and dizzy / lightheaded   Dark urine   Abdominal pain despite medication, splinting, ice or heat   SOB  Calf swelling and

## 2023-11-20 ENCOUNTER — OFFICE VISIT (OUTPATIENT)
Age: 38
End: 2023-11-20
Payer: MEDICAID

## 2023-11-20 VITALS
RESPIRATION RATE: 16 BRPM | OXYGEN SATURATION: 97 % | HEART RATE: 77 BPM | BODY MASS INDEX: 50.02 KG/M2 | WEIGHT: 293 LBS | HEIGHT: 64 IN | TEMPERATURE: 97.6 F | DIASTOLIC BLOOD PRESSURE: 62 MMHG | SYSTOLIC BLOOD PRESSURE: 100 MMHG

## 2023-11-20 DIAGNOSIS — E78.2 MIXED HYPERLIPIDEMIA: ICD-10-CM

## 2023-11-20 DIAGNOSIS — Z23 NEED FOR INFLUENZA VACCINATION: ICD-10-CM

## 2023-11-20 DIAGNOSIS — E66.01 MORBID OBESITY (HCC): Primary | ICD-10-CM

## 2023-11-20 DIAGNOSIS — I10 PRIMARY HYPERTENSION: ICD-10-CM

## 2023-11-20 DIAGNOSIS — F41.9 ANXIETY AND DEPRESSION: ICD-10-CM

## 2023-11-20 DIAGNOSIS — F32.A ANXIETY AND DEPRESSION: ICD-10-CM

## 2023-11-20 DIAGNOSIS — D62 POSTOPERATIVE ANEMIA DUE TO ACUTE BLOOD LOSS: ICD-10-CM

## 2023-11-20 DIAGNOSIS — Z11.59 NEED FOR HEPATITIS B SCREENING TEST: ICD-10-CM

## 2023-11-20 PROCEDURE — 3074F SYST BP LT 130 MM HG: CPT | Performed by: NURSE PRACTITIONER

## 2023-11-20 PROCEDURE — 3078F DIAST BP <80 MM HG: CPT | Performed by: NURSE PRACTITIONER

## 2023-11-20 PROCEDURE — PBSHW PR IM ADM PRQ ID SUBQ/IM NJXS EA VACCINE: Performed by: NURSE PRACTITIONER

## 2023-11-20 PROCEDURE — 90674 CCIIV4 VAC NO PRSV 0.5 ML IM: CPT | Performed by: NURSE PRACTITIONER

## 2023-11-20 PROCEDURE — 99214 OFFICE O/P EST MOD 30 MIN: CPT | Performed by: NURSE PRACTITIONER

## 2023-11-20 PROCEDURE — PBSHW INFLUENZA, FLUCELVAX, (AGE 6 MO+), IM, PF, 0.5 ML: Performed by: NURSE PRACTITIONER

## 2023-11-20 RX ORDER — AMLODIPINE BESYLATE 10 MG/1
10 TABLET ORAL DAILY
Qty: 90 TABLET | Refills: 1 | Status: SHIPPED | OUTPATIENT
Start: 2023-11-20

## 2023-11-20 RX ORDER — ATORVASTATIN CALCIUM 10 MG/1
10 TABLET, FILM COATED ORAL DAILY
Qty: 90 TABLET | Refills: 1 | Status: SHIPPED | OUTPATIENT
Start: 2023-11-20

## 2023-11-20 NOTE — PROGRESS NOTES
[facility] Note     Bindu Aly (: 1985) is a 41 y.o. female, established patient, here for evaluation of the following chief complaint(s):  Hypertension and Hyperlipidemia       ASSESSMENT/PLAN:    1. Morbid obesity ([address])  - S/p gastric bypass on 23, post op appt scheduled for tomorrow    2. Primary hypertension  -     Comprehensive Metabolic Panel; Future  -     amLODIPine (NORVASC) 10 MG tablet; Take 1 tablet by mouth daily, Disp-90 tablet, R-1Z  - Will likely need dose adjustment should her BP remain low  - Last Creatinine 0.61 / BUN 9 / eGFR > 60 on 11/10/23    3. Mixed hyperlipidemia  -     atorvastatin (LIPITOR) 10 MG tablet; Take 1 tablet by mouth daily, Disp-90 tablet, R-1Normal    4. Anxiety and depression  - Stable. Does not need a refill today. 5. Postoperative anemia due to acute blood loss  -     CBC with Auto Differential; Future  - Post op anemia Hb 10.9 / HCT 33.7 on 11/10/23    6. Need for influenza vaccination  -     Influenza, FLUCELVAX, (age 10 mo+), IM, PF, 0.5 mL  -     DC IM ADM PRQ ID SUBQ/IM NJXS EA VACCINE    7. Need for hepatitis B screening test  -     Hepatitis B Surface Antibody; Future  -     Hepatitis B Core Antibody, Total; Future  -     Hepatitis B Surface Antigen; Future        Return in about 6 months (around 2024) for hypertension / blood pressure. SUBJECTIVE/OBJECTIVE:    Bindu Aly is a 41 y.o. female seen today for HTN, HLD, depression and anxiety follow up. Cardiovascular Review:  She has hypertension, hyperlipidemia, and obesity. Diet and Lifestyle: generally follows a low fat low cholesterol diet, generally follows a low sodium diet  Home BP Monitoring: is not measured at home. Pertinent ROS: taking medications as instructed, no medication side effects noted, no TIA's, no chest pain on exertion, no dyspnea on exertion, no swelling of ankles. Mood:  Stable on current medications.  Does not need a

## 2023-11-21 ENCOUNTER — OFFICE VISIT (OUTPATIENT)
Age: 38
End: 2023-11-21

## 2023-11-21 VITALS
OXYGEN SATURATION: 94 % | TEMPERATURE: 98.2 F | RESPIRATION RATE: 16 BRPM | HEART RATE: 85 BPM | BODY MASS INDEX: 50.02 KG/M2 | SYSTOLIC BLOOD PRESSURE: 96 MMHG | DIASTOLIC BLOOD PRESSURE: 68 MMHG | WEIGHT: 293 LBS | HEIGHT: 64 IN

## 2023-11-21 DIAGNOSIS — I95.2 HYPOTENSION DUE TO DRUGS: ICD-10-CM

## 2023-11-21 DIAGNOSIS — K91.2 POSTOPERATIVE INTESTINAL MALABSORPTION: ICD-10-CM

## 2023-11-21 DIAGNOSIS — E66.01 MORBID OBESITY WITH BMI OF 50.0-59.9, ADULT (HCC): Primary | ICD-10-CM

## 2023-11-21 DIAGNOSIS — Z09 SURGICAL FOLLOW-UP CARE: ICD-10-CM

## 2023-11-21 LAB
ALBUMIN SERPL-MCNC: 4 G/DL (ref 3.9–4.9)
ALBUMIN/GLOB SERPL: 1 {RATIO} (ref 1.2–2.2)
ALP SERPL-CCNC: 77 IU/L (ref 44–121)
ALT SERPL-CCNC: 27 IU/L (ref 0–32)
AST SERPL-CCNC: 36 IU/L (ref 0–40)
BASOPHILS # BLD AUTO: 0 X10E3/UL (ref 0–0.2)
BASOPHILS NFR BLD AUTO: 1 %
BILIRUB SERPL-MCNC: 0.3 MG/DL (ref 0–1.2)
BUN SERPL-MCNC: 9 MG/DL (ref 6–20)
BUN/CREAT SERPL: 13 (ref 9–23)
CALCIUM SERPL-MCNC: 9.2 MG/DL (ref 8.7–10.2)
CHLORIDE SERPL-SCNC: 101 MMOL/L (ref 96–106)
CO2 SERPL-SCNC: 19 MMOL/L (ref 20–29)
CREAT SERPL-MCNC: 0.7 MG/DL (ref 0.57–1)
EGFRCR SERPLBLD CKD-EPI 2021: 114 ML/MIN/1.73
EOSINOPHIL # BLD AUTO: 0.1 X10E3/UL (ref 0–0.4)
EOSINOPHIL NFR BLD AUTO: 2 %
ERYTHROCYTE [DISTWIDTH] IN BLOOD BY AUTOMATED COUNT: 12.9 % (ref 11.7–15.4)
GLOBULIN SER CALC-MCNC: 3.9 G/DL (ref 1.5–4.5)
GLUCOSE SERPL-MCNC: 79 MG/DL (ref 70–99)
HCT VFR BLD AUTO: 36.1 % (ref 34–46.6)
HGB BLD-MCNC: 12.4 G/DL (ref 11.1–15.9)
IMM GRANULOCYTES # BLD AUTO: 0 X10E3/UL (ref 0–0.1)
IMM GRANULOCYTES NFR BLD AUTO: 0 %
LYMPHOCYTES # BLD AUTO: 2.1 X10E3/UL (ref 0.7–3.1)
LYMPHOCYTES NFR BLD AUTO: 27 %
MCH RBC QN AUTO: 30.5 PG (ref 26.6–33)
MCHC RBC AUTO-ENTMCNC: 34.3 G/DL (ref 31.5–35.7)
MCV RBC AUTO: 89 FL (ref 79–97)
MONOCYTES # BLD AUTO: 0.5 X10E3/UL (ref 0.1–0.9)
MONOCYTES NFR BLD AUTO: 6 %
NEUTROPHILS # BLD AUTO: 4.8 X10E3/UL (ref 1.4–7)
NEUTROPHILS NFR BLD AUTO: 64 %
PLATELET # BLD AUTO: 286 X10E3/UL (ref 150–450)
POTASSIUM SERPL-SCNC: 4.6 MMOL/L (ref 3.5–5.2)
PROT SERPL-MCNC: 7.9 G/DL (ref 6–8.5)
RBC # BLD AUTO: 4.07 X10E6/UL (ref 3.77–5.28)
SODIUM SERPL-SCNC: 139 MMOL/L (ref 134–144)
WBC # BLD AUTO: 7.6 X10E3/UL (ref 3.4–10.8)

## 2023-11-21 PROCEDURE — 99024 POSTOP FOLLOW-UP VISIT: CPT | Performed by: NURSE PRACTITIONER

## 2023-11-21 ASSESSMENT — PATIENT HEALTH QUESTIONNAIRE - PHQ9
SUM OF ALL RESPONSES TO PHQ QUESTIONS 1-9: 0
4. FEELING TIRED OR HAVING LITTLE ENERGY: 0
9. THOUGHTS THAT YOU WOULD BE BETTER OFF DEAD, OR OF HURTING YOURSELF: 0
1. LITTLE INTEREST OR PLEASURE IN DOING THINGS: 0
SUM OF ALL RESPONSES TO PHQ QUESTIONS 1-9: 0
6. FEELING BAD ABOUT YOURSELF - OR THAT YOU ARE A FAILURE OR HAVE LET YOURSELF OR YOUR FAMILY DOWN: 0
SUM OF ALL RESPONSES TO PHQ QUESTIONS 1-9: 0
SUM OF ALL RESPONSES TO PHQ QUESTIONS 1-9: 0
8. MOVING OR SPEAKING SO SLOWLY THAT OTHER PEOPLE COULD HAVE NOTICED. OR THE OPPOSITE, BEING SO FIGETY OR RESTLESS THAT YOU HAVE BEEN MOVING AROUND A LOT MORE THAN USUAL: 0
2. FEELING DOWN, DEPRESSED OR HOPELESS: 0
5. POOR APPETITE OR OVEREATING: 0
3. TROUBLE FALLING OR STAYING ASLEEP: 0
SUM OF ALL RESPONSES TO PHQ9 QUESTIONS 1 & 2: 0
10. IF YOU CHECKED OFF ANY PROBLEMS, HOW DIFFICULT HAVE THESE PROBLEMS MADE IT FOR YOU TO DO YOUR WORK, TAKE CARE OF THINGS AT HOME, OR GET ALONG WITH OTHER PEOPLE: 0
7. TROUBLE CONCENTRATING ON THINGS, SUCH AS READING THE NEWSPAPER OR WATCHING TELEVISION: 0

## 2023-11-21 ASSESSMENT — COLUMBIA-SUICIDE SEVERITY RATING SCALE - C-SSRS
5. HAVE YOU STARTED TO WORK OUT OR WORKED OUT THE DETAILS OF HOW TO KILL YOURSELF? DO YOU INTEND TO CARRY OUT THIS PLAN?: NO
7. DID THIS OCCUR IN THE LAST THREE MONTHS: NO
4. HAVE YOU HAD THESE THOUGHTS AND HAD SOME INTENTION OF ACTING ON THEM?: NO
3. HAVE YOU BEEN THINKING ABOUT HOW YOU MIGHT KILL YOURSELF?: NO

## 2023-11-21 NOTE — PROGRESS NOTES
Chief Complaint   Patient presents with    Post-Op Check    Weight Management     11/8/23 ROBOTIC ASSISTED LAPAROSCOPIC GASTRIC BYPASS        Malachi Ayers is 2 weeks status post Gastric bypass for treatment of morbid obesity . Presents today for obesity management. Feels ok, but was dizzy last night so did not take BP meds this morning. Saw PCP yesterday and is on Norvasc 10 mg and Metoprolol 25 mg       Patient is satisfied with progress. Pain  Minimal right sided trocar site pain     Protein intake: 50+/- grams of protein daily. Types: Muscle Milk and Gatorade protein   Fluid intake: 64 oz of fluids per day. Types: water mostly     Bowels moving most days     Constipated  Better   Using laxatives prn     Nausea  no  Regurgitation  no    No fever or chills, chest pain or shortness of breath.     Vitamin compliance _yes__ Bariatric MVI 45 mg iron  _yes (liquid) Calcium Citrate 1200 -1500 mg daily in divided doses        Activity  Walking some   Last Weight Metrics:      11/21/2023     9:52 AM 11/20/2023    10:26 AM 11/8/2023    10:08 AM 10/30/2023     1:44 PM 10/10/2023     8:36 AM 9/14/2023     1:44 PM 8/31/2023     1:36 PM   Weight Loss Metrics   Height 5' 4\" 5' 4\" 5' 4\" 5' 4\" 5' 4\" 5' 4\" 5' 4\"   Weight - Scale 298 lbs 10 oz 300 lbs 13 oz 316 lbs 2 oz 319 lbs 329 lbs 13 oz 324 lbs 3 oz 323 lbs 3 oz   BMI (Calculated) 51.4 kg/m2 51.7 kg/m2 54.4 kg/m2 54.9 kg/m2 56.7 kg/m2 55.8 kg/m2 55.6 kg/m2     Last Surgical Weight Loss:      7/18/2023     1:07 PM 10/18/2023     2:44 PM 11/21/2023     9:55 AM   Surgical Weight Loss Tracker   Consult Date  1/10/2023 1/10/2023   Initial Height  5' 4\" 5' 4\"   Initial Weight  326 lb 340 lb   Initial BMI  55.95 58.35   Ideal Body Weight  138 lb 138 lb   Surgery Date  11/8/2023 11/8/2023   Surgery Date-Comment  Gastric Bypass    Pre-Surgical Height  5' 4\" 5' 4\"   Pre-Surgical Weight  326 lb 319 lb   Pre Surgery BMI  55.95 54.75   Weight to Lose  188 lb 181 lb   Date

## 2023-11-22 LAB
HBV CORE AB SERPL QL IA: NEGATIVE
HBV SURFACE AB SER QL: REACTIVE
HBV SURFACE AG SERPL QL IA: NEGATIVE

## 2023-12-01 ENCOUNTER — TELEPHONE (OUTPATIENT)
Age: 38
End: 2023-12-01

## 2023-12-01 NOTE — TELEPHONE ENCOUNTER
Bariatric Post Op Call: Week 3     Hydration: Less than 32 ounces of water daily is fair to poor (Goal is 64 ounces per day)  Poor [] Fair []  Good [] Great [x]    Amount: getting inn 64 ounces    Comment:    Ambulation: walking at least 3x/ week for 30 minutes   Poor [] Fair []  Good [] Great [x]    Comment:    Urine Color: Question of any odor and color (should be shanae, pale, and clear)   Dark [] Shanae [x] Pale [] Clear []    Comment: clears during the day    Diet: Question any nausea and/or vomiting. Protein intake (ultimately goal is 60 grams of protein daily and at this stage they should be meeting goal). Poor [] Fair []  Good [] Great []    Comment: getting in 50 grams      Bowel movements: Question of any constipation- haven't had any bowel movements for more than 3 days. This could be related to protein, fluid intake, medications and activity. Comment: moving her bowels    Incision: (No redness, pain, swelling or fever)     Healing Well [x] Redness [] Pain [] Drainage [] Swelling []    Comment:     Pain: Right sided incisional (usually the largest incision with deep stitch) abdominal pain is normal (should be less than 3). This should be better by 3 weeks post op. Pain 0 - 10: 0    Comment (are they taking pain medication and is it helping?  Abdominal support / splinting/ ice or heat?):       Referred to provider: no  Next Appointment:  12/7/23   Support Group: 2nd Thursday every month from 6-7 pm on Zoom     Red Flags = prompt referral to a bariatric team provider for follow up    Fever > 101  Vomiting and not tolerating liquids   Weak and dizzy / lightheaded   Dark urine   Abdominal pain despite medication, splinting, ice or heat   SOB  Calf swelling and or redness   Chest pain   ____________________________________________________________    If more than one parameter is not met or considered poor, nurse needs to discuss with provider recommend for patient to be seen in the office as soon as

## 2023-12-07 ENCOUNTER — OFFICE VISIT (OUTPATIENT)
Age: 38
End: 2023-12-07

## 2023-12-07 VITALS
RESPIRATION RATE: 18 BRPM | TEMPERATURE: 98 F | SYSTOLIC BLOOD PRESSURE: 117 MMHG | DIASTOLIC BLOOD PRESSURE: 70 MMHG | HEART RATE: 80 BPM | HEIGHT: 64 IN | OXYGEN SATURATION: 94 % | BODY MASS INDEX: 49.72 KG/M2 | WEIGHT: 291.2 LBS

## 2023-12-07 DIAGNOSIS — Z09 SURGICAL FOLLOW-UP CARE: Primary | ICD-10-CM

## 2023-12-07 DIAGNOSIS — R63.39 FOOD AVERSION: ICD-10-CM

## 2023-12-07 DIAGNOSIS — E66.01 MORBID OBESITY WITH BMI OF 45.0-49.9, ADULT (HCC): ICD-10-CM

## 2023-12-07 DIAGNOSIS — K91.2 POSTOPERATIVE INTESTINAL MALABSORPTION: ICD-10-CM

## 2023-12-07 PROCEDURE — 99024 POSTOP FOLLOW-UP VISIT: CPT | Performed by: NURSE PRACTITIONER

## 2023-12-07 ASSESSMENT — PATIENT HEALTH QUESTIONNAIRE - PHQ9
SUM OF ALL RESPONSES TO PHQ QUESTIONS 1-9: 0
2. FEELING DOWN, DEPRESSED OR HOPELESS: 0
1. LITTLE INTEREST OR PLEASURE IN DOING THINGS: 0
SUM OF ALL RESPONSES TO PHQ QUESTIONS 1-9: 0
SUM OF ALL RESPONSES TO PHQ9 QUESTIONS 1 & 2: 0

## 2024-02-01 ENCOUNTER — OFFICE VISIT (OUTPATIENT)
Age: 39
End: 2024-02-01

## 2024-02-01 VITALS
TEMPERATURE: 98.4 F | RESPIRATION RATE: 20 BRPM | BODY MASS INDEX: 45.38 KG/M2 | WEIGHT: 265.8 LBS | HEART RATE: 73 BPM | SYSTOLIC BLOOD PRESSURE: 131 MMHG | DIASTOLIC BLOOD PRESSURE: 85 MMHG | HEIGHT: 64 IN | OXYGEN SATURATION: 98 %

## 2024-02-01 DIAGNOSIS — K91.2 POSTOPERATIVE INTESTINAL MALABSORPTION: Primary | ICD-10-CM

## 2024-02-01 DIAGNOSIS — E61.1 IRON DEFICIENCY: ICD-10-CM

## 2024-02-01 DIAGNOSIS — E66.01 MORBID OBESITY WITH BMI OF 45.0-49.9, ADULT (HCC): ICD-10-CM

## 2024-02-01 DIAGNOSIS — E55.9 VITAMIN D DEFICIENCY: ICD-10-CM

## 2024-02-01 DIAGNOSIS — E53.8 B12 DEFICIENCY: ICD-10-CM

## 2024-02-01 PROCEDURE — 99024 POSTOP FOLLOW-UP VISIT: CPT | Performed by: NURSE PRACTITIONER

## 2024-02-01 NOTE — PATIENT INSTRUCTIONS
Stress Management and Eating Patterns  Veronica Mustafa N.P.  Catarina Fishman LPC, NCC, CCTP    August 8  Exercise and Shortness of Breath  Beatrice Zhong,   Bariatric Patient Care Coordinator  Kelli Lawson MS, ACSM EP Zafar Rosado, [facility] EP   [facility] Physiologists    September 12  Gut Health and GI Issues  Megan Merino, CHEN Mazariegos, N.P.    October 10  Body Image as Your Body Changes  Lucia Vasquez,     Catarina Fishman LPC, NCC, CCTP    November 14  Healthy Holiday Strategies  Anum Hernándeztt     Aida Singh, TATYANA    December 12  Staying Active During the Colder Months  TATYANA Pollock, [facility] EP   [facility] Health and

## 2024-02-01 NOTE — PROGRESS NOTES
Identified pt with two pt identifiers(name and ). Reviewed record in preparation for visit and have obtained necessary documentation. All patient medications has been reviewed.    Chief Complaint   Patient presents with    Post-Op Check     S/P 6 weeks follow up Gastric Bypass/Dr. Nasir Guerrero on 23       Health Maintenance Due   Topic    COVID-19 Vaccine (3 - 2023-24 season)       Vitals:    24 0937   BP: 131/85   Site: Right Upper Arm   Position: Sitting   Pulse: 73   Resp: 20   Temp: 98.4 °F (36.9 °C)   TempSrc: Oral   SpO2: 98%   Weight: 120.6 kg (265 lb 12.8 oz)   Height: 1.626 m (5' 4\")         4.Have you been to the ER, urgent care clinic since your last visit?  Hospitalized since your last visit? No      5. Have you seen or consulted any other health care providers outside of the [facility] since your last visit?  Include any pap smears or colon screening. No      Patient is accompanied by  I have received verbal consent from Citlali Yip to discuss any/all medical information while they are present in the room.

## 2024-02-02 NOTE — PROGRESS NOTES
Chief Complaint   Patient presents with    Post-Op Check     3 months s/p Gastric Bypass       Citlali Yip is 3 months status post Gastric bypass for treatment of morbid obesity .  Presents today for obesity management.  Feeling better and feels like she is \"doing better with eating\"   Minimal nausea and no vomiting   Zoloft cause GI upset     Recall:   B- cereal (Honey Bunches of Oats + Fairlife milk)   L- shake   S- pack of nab crackers   D- chicken sausage + zucchini     Fluids: water +/- 60 oz     Pain  Denies  Bowels moving most days     No fever or chills, chest pain or shortness of breath.    Vitamin compliance _yes__ Bariatric MVI 45 mg iron  _yes__ Calcium Citrate 1200 -1500 mg daily in divided doses        Activity  Minimal   Has a gym membership but has not gone     Sleep   \"Good\"     Last Weight Metrics:      2/1/2024     9:37 AM 12/21/2023     1:20 PM 12/7/2023     9:31 AM 11/21/2023     9:52 AM 11/20/2023    10:26 AM 11/8/2023    10:08 AM 10/30/2023     1:44 PM   Weight Loss Metrics   Height 5' 4\" 5' 4\" 5' 4\" 5' 4\" 5' 4\" 5' 4\" 5' 4\"   Weight - Scale 265 lbs 13 oz 284 lbs 6 oz 291 lbs 3 oz 298 lbs 10 oz 300 lbs 13 oz 316 lbs 2 oz 319 lbs   BMI (Calculated) 45.7 kg/m2 48.9 kg/m2 50.1 kg/m2 51.4 kg/m2 51.7 kg/m2 54.4 kg/m2 54.9 kg/m2     Last Surgical Weight Loss:      7/18/2023     1:07 PM 10/18/2023     2:44 PM 11/21/2023     9:55 AM 12/7/2023     9:32 AM 12/21/2023     1:33 PM 12/21/2023     1:36 PM   Surgical Weight Loss Tracker   Consult Date  1/10/2023 1/10/2023 1/10/2023 1/10/2023    Initial Height  5' 4\" 5' 4\" 5' 4\" 5' 4\"    Initial Weight  326 lb 340 lb 340 lb 326 lb    Initial BMI  55.95 58.35 58.35 55.95    Ideal Body Weight  138 lb 138 lb 138 lb 138 lb    Surgery Date  11/8/2023 11/8/2023 11/8/2023 11/8/2023    Surgery Date-Comment  Gastric Bypass       Pre-Surgical Height  5' 4\" 5' 4\" 5' 4\" 5' 4\"    Pre-Surgical Weight  326 lb 319 lb 319 lb 319 lb    Pre Surgery BMI  55.95 54.75 54.75

## 2024-02-16 ENCOUNTER — OFFICE VISIT (OUTPATIENT)
Age: 39
End: 2024-02-16

## 2024-02-16 VITALS
DIASTOLIC BLOOD PRESSURE: 86 MMHG | SYSTOLIC BLOOD PRESSURE: 130 MMHG | OXYGEN SATURATION: 99 % | WEIGHT: 261.6 LBS | HEART RATE: 84 BPM | HEIGHT: 64 IN | TEMPERATURE: 97.5 F | BODY MASS INDEX: 44.66 KG/M2

## 2024-02-16 DIAGNOSIS — M25.511 ACUTE PAIN OF RIGHT SHOULDER: Primary | ICD-10-CM

## 2024-02-16 DIAGNOSIS — M25.561 ACUTE PAIN OF RIGHT KNEE: ICD-10-CM

## 2024-02-16 PROCEDURE — 3079F DIAST BP 80-89 MM HG: CPT

## 2024-02-16 PROCEDURE — 3075F SYST BP GE 130 - 139MM HG: CPT

## 2024-02-16 PROCEDURE — 99214 OFFICE O/P EST MOD 30 MIN: CPT

## 2024-02-16 PROCEDURE — PBSHW PBB SHADOW CHARGE

## 2024-02-16 RX ORDER — METHYLPREDNISOLONE SODIUM SUCCINATE 40 MG/ML
40 INJECTION, POWDER, LYOPHILIZED, FOR SOLUTION INTRAMUSCULAR; INTRAVENOUS ONCE
Status: COMPLETED | OUTPATIENT
Start: 2024-02-16 | End: 2024-02-16

## 2024-02-16 RX ADMIN — Medication 40 MG: at 16:45
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
## 2024-02-28 ENCOUNTER — HOSPITAL ENCOUNTER (OUTPATIENT)
Facility: HOSPITAL | Age: 39
Setting detail: RECURRING SERIES
Discharge: HOME | End: 2024-03-02
Payer: MEDICAID

## 2024-02-28 PROCEDURE — 97110 THERAPEUTIC EXERCISES: CPT

## 2024-02-28 PROCEDURE — 97162 PT EVAL MOD COMPLEX 30 MIN: CPT

## 2024-02-28 NOTE — THERAPY EVALUATION
(see flow sheet as applicable)    Details if applicable:  PROM/Manual S R GH jt, Patellar mobs R knee, PROM/Manual S         Details if applicable:           Details if applicable:           Details if applicable:     30 30    Total Total           Modalities Rationale:     decrease edema, decrease inflammation, and decrease pain to improve patient's ability to progress to PLOF and address remaining functional goals.       min [] Estim Unattended,             type/location:       []  w/ice    []  w/heat          min [] Estim Attended,             type/location:       []  w/ice   []  w/heat         []  w/US   []  TENS insruct                min []  Mechanical Traction,        type/lbs:        []  pro      []  sup           []  int       []  cont            []  before manual           []  after manual     min []  Ultrasound,         settings/location:     10 min  unbilled [x]  Ice     []  Heat            location/position: R knee and R shoulder- supine -post             min []  Vasopneumatic Device,      press/temp:   pre-treatment girth :    post-treatment girth :    measured at (landmark       location) :   If using vaso (only need to measure limb vaso being performed on)        min []  Other:        Skin assessment post-treatment (if applicable):    [x]  intact    []  redness- no adverse reaction                 []redness - adverse reaction:          [x]  Patient Education billed concurrently with other procedures   [x] Review HEP    [] Progressed/Changed HEP, detail:    [] Other detail:         Pain Level at end of session (0-10 scale): unchanged    Plan of Care / Statement of Necessity for Physical Therapy Services     Assessment / dickey information:  Citlali is a 38 yr old female who presents with findings consistent with right shoulder pain, possible impingement and right knee strain.  She presents with decreased strength, ROM, balance, functional activity tolerance limiting normal ADLs. She will benefit from

## 2024-03-04 ENCOUNTER — HOSPITAL ENCOUNTER (OUTPATIENT)
Facility: HOSPITAL | Age: 39
Setting detail: RECURRING SERIES
Discharge: HOME | End: 2024-03-07
Payer: MEDICAID

## 2024-03-04 PROCEDURE — 97110 THERAPEUTIC EXERCISES: CPT

## 2024-03-04 PROCEDURE — 97140 MANUAL THERAPY 1/> REGIONS: CPT

## 2024-03-04 NOTE — PROGRESS NOTES
Progress Note/Recertification  NT      PLAN  Yes  Continue plan of care  Re-Cert Due: --  []  Upgrade activities as tolerated  []  Discharge due to :  []  Other:      Pattie Munoz, PT       3/4/2024       3:49 PM

## 2024-03-06 ENCOUNTER — HOSPITAL ENCOUNTER (OUTPATIENT)
Facility: HOSPITAL | Age: 39
Setting detail: RECURRING SERIES
Discharge: HOME | End: 2024-03-09
Payer: MEDICAID

## 2024-03-06 PROCEDURE — 97110 THERAPEUTIC EXERCISES: CPT

## 2024-03-06 PROCEDURE — 97140 MANUAL THERAPY 1/> REGIONS: CPT

## 2024-03-06 NOTE — PROGRESS NOTES
PHYSICAL THERAPY - DAILY TREATMENT NOTE (updated 3/23)      Date: 3/6/2024          Patient Name:  Citlali Yip :  1985   Medical   Diagnosis:  Acute pain of right shoulder [M25.511]  Acute pain of right knee [M25.561] Treatment Diagnosis:  M25.561  RIGHT KNEE PAIN  and M25.511  RIGHT SHOULDER PAIN    Referral Source:  Margi Painting APRN * Insurance:   Payor: [facility] MEDICAID / Plan: [facility] PLAN([facility]) / Product Type: *No Product type* /                     Patient  verified yes     Visit #   Current  / Total 3 24   Time   In / Out 10:00a 11:00A   Total Treatment Time 70   Total Timed Codes 60         SUBJECTIVE    Pain Level (0-10 scale): 5    Any medication changes, allergies to medications, adverse drug reactions, diagnosis change, or new procedure performed?: [x] No    [] Yes (see summary sheet for update)  Medications: Verified on Patient Summary List    Subjective functional status/changes:     Reports that she is feeling better overall.  Her right shoulder continues to be sore jodi. With lifting and reaching.  R knee is achy but improving  OBJECTIVE      Therapeutic Procedures:  Tx Min Billable or 1:1 Min (if diff from Tx Min) Procedure, Rationale, Specifics   45  51201 Therapeutic Exercise (timed):  increase ROM, strength, coordination, balance, and proprioception to improve patient's ability to progress to PLOF and address remaining functional goals. (see flow sheet as applicable)     Details if applicable:     15  67405 Manual Therapy (timed):  decrease pain, increase ROM, increase tissue extensibility, and decrease trigger points to improve patient's ability to progress to PLOF and address remaining functional goals.  The manual therapy interventions were performed at a separate and distinct time from the therapeutic activities interventions . (see flow sheet as applicable)     Details if applicable:  PROM R shoulder all planes to tolerance; STM/TPR R infra muscle

## 2024-03-08 ENCOUNTER — OFFICE VISIT (OUTPATIENT)
Age: 39
End: 2024-03-08
Payer: MEDICAID

## 2024-03-08 VITALS
SYSTOLIC BLOOD PRESSURE: 139 MMHG | DIASTOLIC BLOOD PRESSURE: 83 MMHG | RESPIRATION RATE: 16 BRPM | HEIGHT: 64 IN | BODY MASS INDEX: 43.84 KG/M2 | HEART RATE: 91 BPM | OXYGEN SATURATION: 99 % | TEMPERATURE: 97.8 F | WEIGHT: 256.8 LBS

## 2024-03-08 DIAGNOSIS — M25.561 ACUTE PAIN OF RIGHT KNEE: ICD-10-CM

## 2024-03-08 DIAGNOSIS — Z98.84 S/P GASTRIC BYPASS: ICD-10-CM
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
## 2024-03-08 DIAGNOSIS — M25.511 ACUTE PAIN OF RIGHT SHOULDER: ICD-10-CM

## 2024-03-08 DIAGNOSIS — F43.12 CHRONIC POST-TRAUMATIC STRESS DISORDER (PTSD): ICD-10-CM

## 2024-03-08 PROCEDURE — 3075F SYST BP GE 130 - 139MM HG: CPT | Performed by: NURSE PRACTITIONER

## 2024-03-08 PROCEDURE — 3079F DIAST BP 80-89 MM HG: CPT | Performed by: NURSE PRACTITIONER

## 2024-03-08 PROCEDURE — 99214 OFFICE O/P EST MOD 30 MIN: CPT | Performed by: NURSE PRACTITIONER

## 2024-03-08 RX ORDER — LIDOCAINE 4 G/G
1 PATCH TOPICAL DAILY
Qty: 30 PATCH | Refills: 0 | Status: SHIPPED | OUTPATIENT
Start: 2024-03-08 | End: 2024-04-07

## 2024-03-08 NOTE — PROGRESS NOTES
Chief Complaint   Patient presents with    Hypertension    Hyperlipidemia       \"Have you been to the ER, urgent care clinic since your last visit?  Hospitalized since your last visit?\"    NO    “Have you seen or consulted any other health care providers outside of [facility] since your last visit?”    NO             2/16/2024     3:04 PM   PHQ-9    Little interest or pleasure in doing things 3   Feeling down, depressed, or hopeless 3   Trouble falling or staying asleep, or sleeping too much 0   Feeling tired or having little energy 2   Poor appetite or overeating 0   Feeling bad about yourself - or that you are a failure or have let yourself or your family down 2   Trouble concentrating on things, such as reading the newspaper or watching television 0   Moving or speaking so slowly that other people could have noticed. Or the opposite - being so fidgety or restless that you have been moving around a lot more than usual 0   Thoughts that you would be better off dead, or of hurting yourself in some way 0   PHQ-2 Score 6   PHQ-9 Total Score 10   If you checked off any problems, how difficult have these problems made it for you to do your work, take care of things at home, or get along with other people? 2       Health Maintenance Due   Topic Date Due    COVID-19 Vaccine (3 - 2023-24 season) 09/01/2023
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
## 2024-03-11 ENCOUNTER — HOSPITAL ENCOUNTER (OUTPATIENT)
Facility: HOSPITAL | Age: 39
Setting detail: RECURRING SERIES
Discharge: HOME | End: 2024-03-14
Payer: MEDICAID

## 2024-03-11 PROCEDURE — 97140 MANUAL THERAPY 1/> REGIONS: CPT

## 2024-03-11 PROCEDURE — 97110 THERAPEUTIC EXERCISES: CPT

## 2024-03-11 NOTE — PROGRESS NOTES
address functional deficits and attain remaining goals.    Progress toward goals / Updated goals:  []  See Progress Note/Recertification  NT      PLAN  Yes  Continue plan of care  Re-Cert Due: --  []  Upgrade activities as tolerated  []  Discharge due to :  []  Other:      Josie Luis, PTA       3/11/2024       8:59 AM

## 2024-03-12 RX ORDER — ACETAMINOPHEN 160 MG
2000 TABLET,DISINTEGRATING ORAL 2 TIMES DAILY
Qty: 180 CAPSULE | Refills: 1 | Status: SHIPPED | OUTPATIENT
Start: 2024-03-12

## 2024-03-12 NOTE — TELEPHONE ENCOUNTER
PCP: Michela Sin, APRN - NP    Last appt: 3/8/2024   Future Appointments   Date Time Provider Department Center   3/14/2024  8:30 AM Jennifer Daniel, PT [facility]   3/18/2024  9:00 AM Josie Luis, PTA [facility]   3/20/2024  9:00 AM Josie Luis, PTA [facility]   3/25/2024  9:00 AM Liv Newsome LCSW [facility]   5/2/2024  9:00 AM [facility] - [facility]   5/20/2024  8:30 AM Michela Sin, APRN - NP [facility]   5/30/2024  9:30 AM [facility] Drake [facility] - [facility]       Requested Prescriptions     Pending Prescriptions Disp Refills    VITAMIN D3 50 MCG (2000 UT) CAPS capsule [Pharmacy Med Name: VITAMIN D3 2,000UNIT CAPSULES] 180 capsule 1     Sig: TAKE 1 CAPSULE BY MOUTH TWICE DAILY         Prior labs and Blood pressures:  BP Readings from Last 3 Encounters:   03/08/24 139/83   02/16/24 130/86   02/01/24 131/85     Lab Results   Component Value Date/Time     11/20/2023 12:00 AM    K 4.6 11/20/2023 12:00 AM     11/20/2023 12:00 AM    CO2 19 11/20/2023 12:00 AM    BUN 9 11/20/2023 12:00 AM    GFRAA 133 09/11/2020 12:00 AM     No results found for: \"HBA1C\", \"[id number]\"  Lab Results   Component Value Date/Time    CHOL 157 05/17/2023 12:00 AM    CHOL 167 12/28/2022 12:00 AM    HDL 44 05/17/2023 12:00 AM    VLDL 17 12/28/2022 12:00 AM     No results found for: \"VITD3\", \"VD3RIA\"    No results found for: \"TSH\", \"TSH2\", \"TSH3\"

## 2024-03-14 ENCOUNTER — HOSPITAL ENCOUNTER (OUTPATIENT)
Facility: HOSPITAL | Age: 39
Setting detail: RECURRING SERIES
Discharge: HOME | End: 2024-03-17
Payer: MEDICAID

## 2024-03-14 PROCEDURE — 97140 MANUAL THERAPY 1/> REGIONS: CPT

## 2024-03-14 PROCEDURE — 97110 THERAPEUTIC EXERCISES: CPT

## 2024-03-14 NOTE — PROGRESS NOTES
PHYSICAL THERAPY - DAILY TREATMENT NOTE (updated 3/23)      Date: 3/14/2024          Patient Name:  Citlali Yip :  1985   Medical   Diagnosis:  Acute pain of right shoulder [M25.511]  Acute pain of right knee [M25.561] Treatment Diagnosis:  M25.561  RIGHT KNEE PAIN  and M25.511  RIGHT SHOULDER PAIN    Referral Source:  Margi Painting APRN * Insurance:   Payor: [facility] MEDICAID / Plan: [facility] PLAN([facility]) / Product Type: *No Product type* /                     Patient  verified yes     Visit #   Current  / Total 5 24   Time   In / Out 8:25 a 9:45A   Total Treatment Time 80   Total Timed Codes 70         SUBJECTIVE    Pain Level (0-10 scale): 3    Any medication changes, allergies to medications, adverse drug reactions, diagnosis change, or new procedure performed?: [x] No    [] Yes (see summary sheet for update)  Medications: Verified on Patient Summary List    Subjective functional status/changes:     Reports that \"the pain is much less.\" She reports 50% improvement-  She still experiences pain with standing, and lifting.    OBJECTIVE      Therapeutic Procedures:  Tx Min Billable or 1:1 Min (if diff from Tx Min) Procedure, Rationale, Specifics   55  06136 Therapeutic Exercise (timed):  increase ROM, strength, coordination, balance, and proprioception to improve patient's ability to progress to PLOF and address remaining functional goals. (see flow sheet as applicable)     Details if applicable:     15  25023 Manual Therapy (timed):  decrease pain, increase ROM, increase tissue extensibility, and decrease trigger points to improve patient's ability to progress to PLOF and address remaining functional goals.  The manual therapy interventions were performed at a separate and distinct time from the therapeutic activities interventions . (see flow sheet as applicable)     Details if applicable:  PROM R shoulder all planes to tolerance; STM/TPR R infra muscle belly  STM to R calf

## 2024-03-18 ENCOUNTER — HOSPITAL ENCOUNTER (OUTPATIENT)
Facility: HOSPITAL | Age: 39
Setting detail: RECURRING SERIES
Discharge: HOME | End: 2024-03-21
Payer: MEDICAID

## 2024-03-18 PROCEDURE — 97140 MANUAL THERAPY 1/> REGIONS: CPT

## 2024-03-18 PROCEDURE — 97110 THERAPEUTIC EXERCISES: CPT

## 2024-03-18 NOTE — PROGRESS NOTES
Note/Recertification  NT      PLAN  Yes  Continue plan of care  Re-Cert Due: --  []  Upgrade activities as tolerated  []  Discharge due to :  []  Other:      Josie Luis, GUILLERMO       3/18/2024       9:52 AM

## 2024-03-20 ENCOUNTER — HOSPITAL ENCOUNTER (OUTPATIENT)
Facility: HOSPITAL | Age: 39
Setting detail: RECURRING SERIES
Discharge: HOME | End: 2024-03-23
Payer: MEDICAID

## 2024-03-20 PROCEDURE — 97140 MANUAL THERAPY 1/> REGIONS: CPT

## 2024-03-20 PROCEDURE — 97110 THERAPEUTIC EXERCISES: CPT

## 2024-03-20 NOTE — PROGRESS NOTES
PHYSICAL THERAPY - DAILY TREATMENT NOTE (updated 3/23)      Date: 3/20/2024          Patient Name:  Citlali Yip :  1985   Medical   Diagnosis:  Acute pain of right shoulder [M25.511]  Acute pain of right knee [M25.561] Treatment Diagnosis:  M25.561  RIGHT KNEE PAIN  and M25.511  RIGHT SHOULDER PAIN    Referral Source:  Margi Painting APRN * Insurance:   Payor: [facility] MEDICAID / Plan: [facility] PLAN([facility]) / Product Type: *No Product type* /                     Patient  verified yes     Visit #   Current  / Total 7 24   Time   In / Out 9:00 a 9:55 A   Total Treatment Time 55   Total Timed Codes 45         SUBJECTIVE    Pain Level (0-10 scale): 5    Any medication changes, allergies to medications, adverse drug reactions, diagnosis change, or new procedure performed?: [x] No    [] Yes (see summary sheet for update)  Medications: Verified on Patient Summary List    Subjective functional status/changes:     Reports her R knee has been sore today. Did well after last visit.    OBJECTIVE      Therapeutic Procedures:  Tx Min Billable or 1:1 Min (if diff from Tx Min) Procedure, Rationale, Specifics   30  48388 Therapeutic Exercise (timed):  increase ROM, strength, coordination, balance, and proprioception to improve patient's ability to progress to PLOF and address remaining functional goals. (see flow sheet as applicable)     Details if applicable:     10  31304 Manual Therapy (timed):  decrease pain, increase ROM, increase tissue extensibility, and decrease trigger points to improve patient's ability to progress to PLOF and address remaining functional goals.  The manual therapy interventions were performed at a separate and distinct time from the therapeutic activities interventions . (see flow sheet as applicable)     Details if applicable:  PROM R shoulder all planes to tolerance; STM/TPR R infra muscle belly  STM to R calf proximal omit  R patellar mobs   40       Total Total

## 2024-03-25 ENCOUNTER — OFFICE VISIT (OUTPATIENT)
Age: 39
End: 2024-03-25
Payer: MEDICAID

## 2024-03-25 ENCOUNTER — HOSPITAL ENCOUNTER (OUTPATIENT)
Facility: HOSPITAL | Age: 39
Setting detail: RECURRING SERIES
Discharge: HOME | End: 2024-03-28
Payer: MEDICAID

## 2024-03-25 DIAGNOSIS — F43.22 ADJUSTMENT DISORDER WITH ANXIETY: Primary | ICD-10-CM

## 2024-03-25 PROCEDURE — 90834 PSYTX W PT 45 MINUTES: CPT | Performed by: SOCIAL WORKER

## 2024-03-25 PROCEDURE — 97140 MANUAL THERAPY 1/> REGIONS: CPT

## 2024-03-25 PROCEDURE — 97110 THERAPEUTIC EXERCISES: CPT
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
## 2024-03-27 ENCOUNTER — HOSPITAL ENCOUNTER (OUTPATIENT)
Facility: HOSPITAL | Age: 39
Setting detail: RECURRING SERIES
Discharge: HOME | End: 2024-03-30
Payer: MEDICAID

## 2024-03-27 PROCEDURE — 97110 THERAPEUTIC EXERCISES: CPT

## 2024-03-27 PROCEDURE — 97016 VASOPNEUMATIC DEVICE THERAPY: CPT

## 2024-03-27 PROCEDURE — 97140 MANUAL THERAPY 1/> REGIONS: CPT

## 2024-03-27 NOTE — PROGRESS NOTES
PHYSICAL THERAPY - DAILY TREATMENT NOTE (updated 3/23)      Date: 3/27/2024          Patient Name:  Citlali Yip :  1985   Medical   Diagnosis:  Acute pain of right shoulder [M25.511]  Acute pain of right knee [M25.561] Treatment Diagnosis:  M25.561  RIGHT KNEE PAIN  and M25.511  RIGHT SHOULDER PAIN    Referral Source:  Margi Painting APRN * Insurance:   Payor: [facility] MEDICAID / Plan: [facility] PLAN([facility]) / Product Type: *No Product type* /                     Patient  verified yes     Visit #   Current  / Total 9 24   Time   In / Out 8:00A 9:15 A   Total Treatment Time 75   Total Timed Codes 65         SUBJECTIVE    Pain Level (0-10 scale): 3    Any medication changes, allergies to medications, adverse drug reactions, diagnosis change, or new procedure performed?: [x] No    [] Yes (see summary sheet for update)  Medications: Verified on Patient Summary List    Subjective functional status/changes:     Reports stiffness today most likely due to the weather.    OBJECTIVE      Therapeutic Procedures:  Tx Min Billable or 1:1 Min (if diff from Tx Min) Procedure, Rationale, Specifics   45  99743 Therapeutic Exercise (timed):  increase ROM, strength, coordination, balance, and proprioception to improve patient's ability to progress to PLOF and address remaining functional goals. (see flow sheet as applicable)     Details if applicable:     20  96103 Manual Therapy (timed):  decrease pain, increase ROM, increase tissue extensibility, and decrease trigger points to improve patient's ability to progress to PLOF and address remaining functional goals.  The manual therapy interventions were performed at a separate and distinct time from the therapeutic activities interventions . (see flow sheet as applicable)     Details if applicable:  PROM R shoulder all planes to tolerance; STM/TPR R infra muscle belly    R patellar mobs- STM to posterior knee   65       Total Total         Modalities

## 2024-04-01 ENCOUNTER — HOSPITAL ENCOUNTER (OUTPATIENT)
Facility: HOSPITAL | Age: 39
Setting detail: RECURRING SERIES
Discharge: HOME | End: 2024-04-04
Payer: MEDICAID

## 2024-04-01 PROCEDURE — 97110 THERAPEUTIC EXERCISES: CPT

## 2024-04-01 PROCEDURE — 97140 MANUAL THERAPY 1/> REGIONS: CPT

## 2024-04-01 NOTE — PROGRESS NOTES
Total         Modalities Rationale:     decrease inflammation and decrease pain to improve patient's ability to progress to PLOF and address remaining functional goals.       min [] Estim Unattended,             type/location:       []  w/ice    []  w/heat        min [] Estim Attended,             type/location:       []  w/ice   []  w/heat         []  w/US   []  TENS insruct            min []  Mechanical Traction,        type/lbs:        []  pro      []  sup           []  int       []  cont            []  before manual           []  after manual     min []  Ultrasound,         settings/location:     10 min  unbilled [x]  Ice     []  Heat            location/position: R knee and R sh - supine   0     min [x]  Vasopneumatic Device,      press/temp:   pre-treatment girth :    post-treatment girth :    measured at (landmark       location) :   If using vaso (only need to measure limb vaso being performed on)    R knee    min []  Other:        Skin assessment post-treatment (if applicable):    [x]  intact    []  redness- no adverse reaction                 []redness - adverse reaction:          [x]  Patient Education billed concurrently with other procedures   [x] Review HEP    [] Progressed/Changed HEP, detail:    [] Other detail:         Other Objective/Functional Measures  --    Pain Level at end of session (0-10 scale): 0/10    Assessment   Good tolerance to all exercises and added sidelying ER. Overall progressing well and will benefit from continued physical therapy to continue strengthening to return to PLOF.   Patient will continue to benefit from skilled PT / OT services to modify and progress therapeutic interventions, analyze and address functional mobility deficits, analyze and address ROM deficits, analyze and address strength deficits, analyze and address soft tissue restrictions, analyze and cue for proper movement patterns, and analyze and modify for postural abnormalities to address functional deficits

## 2024-04-03 ENCOUNTER — HOSPITAL ENCOUNTER (OUTPATIENT)
Facility: HOSPITAL | Age: 39
Setting detail: RECURRING SERIES
Discharge: HOME | End: 2024-04-06
Payer: MEDICAID

## 2024-04-03 PROCEDURE — 97110 THERAPEUTIC EXERCISES: CPT

## 2024-04-03 PROCEDURE — 97140 MANUAL THERAPY 1/> REGIONS: CPT

## 2024-04-03 NOTE — PROGRESS NOTES
PHYSICAL THERAPY - DAILY TREATMENT NOTE (updated 3/23)      Date: 4/3/2024          Patient Name:  Citlali Yip :  1985   Medical   Diagnosis:  Acute pain of right shoulder [M25.511]  Acute pain of right knee [M25.561] Treatment Diagnosis:  M25.561  RIGHT KNEE PAIN  and M25.511  RIGHT SHOULDER PAIN    Referral Source:  Margi Painting APRN * Insurance:   Payor: [facility] MEDICAID / Plan: [facility] PLAN([facility]) / Product Type: *No Product type* /                     Patient  verified yes     Visit #   Current  / Total 11 24   Time   In / Out 923 A 1023 A   Total Treatment Time 60   Total Timed Codes 50         SUBJECTIVE    Pain Level (0-10 scale): 3    Any medication changes, allergies to medications, adverse drug reactions, diagnosis change, or new procedure performed?: [x] No    [] Yes (see summary sheet for update)  Medications: Verified on Patient Summary List    Subjective functional status/changes:     Pt reports she has noticed a 75% improvement since beginning physical therapy. Knee is feeling okay today. Has pain in right shoulder (points to AC joint)    OBJECTIVE      Therapeutic Procedures:  Tx Min Billable or 1:1 Min (if diff from Tx Min) Procedure, Rationale, Specifics   35  [id number] Therapeutic Exercise (timed):  increase ROM, strength, coordination, balance, and proprioception to improve patient's ability to progress to PLOF and address remaining functional goals. (see flow sheet as applicable)     Details if applicable:     15  94300 Manual Therapy (timed):  decrease pain, increase ROM, increase tissue extensibility, and decrease trigger points to improve patient's ability to progress to PLOF and address remaining functional goals.  The manual therapy interventions were performed at a separate and distinct time from the therapeutic activities interventions . (see flow sheet as applicable)     Details if applicable:  PROM R shoulder all planes to tolerance; STM/TPR R infra

## 2024-04-09 ENCOUNTER — HOSPITAL ENCOUNTER (OUTPATIENT)
Facility: HOSPITAL | Age: 39
Setting detail: RECURRING SERIES
Discharge: HOME | End: 2024-04-12
Payer: MEDICAID

## 2024-04-09 PROCEDURE — 97140 MANUAL THERAPY 1/> REGIONS: CPT

## 2024-04-09 PROCEDURE — 97110 THERAPEUTIC EXERCISES: CPT

## 2024-04-10 ENCOUNTER — PATIENT MESSAGE (OUTPATIENT)
Age: 39
End: 2024-04-10
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
## 2024-04-11 ENCOUNTER — HOSPITAL ENCOUNTER (OUTPATIENT)
Facility: HOSPITAL | Age: 39
Setting detail: RECURRING SERIES
Discharge: HOME | End: 2024-04-14
Payer: MEDICAID

## 2024-04-11 PROCEDURE — 97140 MANUAL THERAPY 1/> REGIONS: CPT

## 2024-04-11 PROCEDURE — 97110 THERAPEUTIC EXERCISES: CPT

## 2024-04-11 NOTE — PROGRESS NOTES
restirction- MET  AROM R shoulder within WFLs to reach into cupboard for dishes wo restriction- MET  Long Term Goals: To be accomplished in 12 weeks.   AROM R shoulder WNLs to return to all ADLs unrestricted- MET  Amb community distances normal gait wo restriction- MET  Strength right UE WNLs to carry groceries into house- MET  Strength R LE to stand greater than 45' wo restriction- MET      ALL goals achieved. Given blue theraband for progression at home         PLAN  []  Upgrade activities as tolerated  [x]  Discharge due to :  all goals achieved- to continue with HEP  []  Other:      Jennifer Daniel, PT       4/11/2024       8:42 AM

## 2024-04-12 ENCOUNTER — E-VISIT (OUTPATIENT)
Age: 39
End: 2024-04-12
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
## 2024-04-12 DIAGNOSIS — M62.838 MUSCLE SPASM: ICD-10-CM

## 2024-04-15 ENCOUNTER — APPOINTMENT (OUTPATIENT)
Facility: HOSPITAL | Age: 39
End: 2024-04-15
Payer: MEDICAID

## 2024-04-15 RX ORDER — METHOCARBAMOL 750 MG/1
750 TABLET, FILM COATED ORAL 4 TIMES DAILY PRN
Qty: 40 TABLET | Refills: 1 | Status: SHIPPED | OUTPATIENT
Start: 2024-04-15 | End: 2024-04-25

## 2024-04-16 DIAGNOSIS — E55.9 VITAMIN D DEFICIENCY: ICD-10-CM

## 2024-04-16 DIAGNOSIS — E53.8 B12 DEFICIENCY: ICD-10-CM

## 2024-04-16 DIAGNOSIS — K91.2 POSTOPERATIVE INTESTINAL MALABSORPTION: ICD-10-CM

## 2024-04-16 DIAGNOSIS — E61.1 IRON DEFICIENCY: ICD-10-CM

## 2024-04-16 DIAGNOSIS — E66.01 MORBID OBESITY WITH BMI OF 45.0-49.9, ADULT (HCC): ICD-10-CM

## 2024-04-16 LAB
25(OH)D3 SERPL-MCNC: 53.3 NG/ML (ref 30–100)
ALBUMIN SERPL-MCNC: 3.5 G/DL (ref 3.5–5)
ALBUMIN/GLOB SERPL: 0.8 (ref 1.1–2.2)
ALP SERPL-CCNC: 96 U/L (ref 45–117)
ALT SERPL-CCNC: 36 U/L (ref 12–78)
ANION GAP SERPL CALC-SCNC: 2 MMOL/L (ref 5–15)
AST SERPL-CCNC: 26 U/L (ref 15–37)
BASOPHILS # BLD: 0 K/UL (ref 0–0.1)
BASOPHILS NFR BLD: 1 % (ref 0–1)
BILIRUB SERPL-MCNC: 0.5 MG/DL (ref 0.2–1)
BUN SERPL-MCNC: 8 MG/DL (ref 6–20)
BUN/CREAT SERPL: 11 (ref 12–20)
CALCIUM SERPL-MCNC: 9.2 MG/DL (ref 8.5–10.1)
CHLORIDE SERPL-SCNC: 109 MMOL/L (ref 97–108)
CO2 SERPL-SCNC: 30 MMOL/L (ref 21–32)
CREAT SERPL-MCNC: 0.74 MG/DL (ref 0.55–1.02)
DIFFERENTIAL METHOD BLD: NORMAL
EOSINOPHIL # BLD: 0.1 K/UL (ref 0–0.4)
EOSINOPHIL NFR BLD: 1 % (ref 0–7)
ERYTHROCYTE [DISTWIDTH] IN BLOOD BY AUTOMATED COUNT: 13.7 % (ref 11.5–14.5)
FERRITIN SERPL-MCNC: 79 NG/ML (ref 26–388)
FOLATE SERPL-MCNC: 17.4 NG/ML (ref 5–21)
GLOBULIN SER CALC-MCNC: 4.2 G/DL (ref 2–4)
GLUCOSE SERPL-MCNC: 103 MG/DL (ref 65–100)
HCT VFR BLD AUTO: 37.5 % (ref 35–47)
HGB BLD-MCNC: 12.3 G/DL (ref 11.5–16)
IMM GRANULOCYTES # BLD AUTO: 0 K/UL (ref 0–0.04)
IMM GRANULOCYTES NFR BLD AUTO: 0 % (ref 0–0.5)
IRON SATN MFR SERPL: 27 % (ref 20–50)
IRON SERPL-MCNC: 85 UG/DL (ref 35–150)
LYMPHOCYTES # BLD: 2.3 K/UL (ref 0.8–3.5)
LYMPHOCYTES NFR BLD: 40 % (ref 12–49)
MCH RBC QN AUTO: 29.9 PG (ref 26–34)
MCHC RBC AUTO-ENTMCNC: 32.8 G/DL (ref 30–36.5)
MCV RBC AUTO: 91 FL (ref 80–99)
MONOCYTES # BLD: 0.4 K/UL (ref 0–1)
MONOCYTES NFR BLD: 6 % (ref 5–13)
NEUTS SEG # BLD: 2.9 K/UL (ref 1.8–8)
NEUTS SEG NFR BLD: 52 % (ref 32–75)
NRBC # BLD: 0 K/UL (ref 0–0.01)
NRBC BLD-RTO: 0 PER 100 WBC
PLATELET # BLD AUTO: 208 K/UL (ref 150–400)
PMV BLD AUTO: 11.9 FL (ref 8.9–12.9)
POTASSIUM SERPL-SCNC: 3.7 MMOL/L (ref 3.5–5.1)
PROT SERPL-MCNC: 7.7 G/DL (ref 6.4–8.2)
RBC # BLD AUTO: 4.12 M/UL (ref 3.8–5.2)
SODIUM SERPL-SCNC: 141 MMOL/L (ref 136–145)
TIBC SERPL-MCNC: 313 UG/DL (ref 250–450)
VIT B12 SERPL-MCNC: 949 PG/ML (ref 193–986)
WBC # BLD AUTO: 5.7 K/UL (ref 3.6–11)

## 2024-04-17 ENCOUNTER — APPOINTMENT (OUTPATIENT)
Facility: HOSPITAL | Age: 39
End: 2024-04-17
Payer: MEDICAID

## 2024-05-02 ENCOUNTER — OFFICE VISIT (OUTPATIENT)
Age: 39
End: 2024-05-02
Payer: MEDICAID

## 2024-05-02 VITALS
RESPIRATION RATE: 16 BRPM | SYSTOLIC BLOOD PRESSURE: 127 MMHG | TEMPERATURE: 98.9 F | WEIGHT: 243 LBS | BODY MASS INDEX: 41.48 KG/M2 | HEART RATE: 83 BPM | HEIGHT: 64 IN | OXYGEN SATURATION: 98 % | DIASTOLIC BLOOD PRESSURE: 81 MMHG

## 2024-05-02 DIAGNOSIS — E66.01 MORBID OBESITY WITH BMI OF 40.0-44.9, ADULT (HCC): ICD-10-CM

## 2024-05-02 DIAGNOSIS — L98.7 EXCESS SKIN: ICD-10-CM

## 2024-05-02 DIAGNOSIS — K91.2 POSTOPERATIVE INTESTINAL MALABSORPTION: Primary | ICD-10-CM

## 2024-05-02 DIAGNOSIS — L30.4 INTERTRIGO: ICD-10-CM

## 2024-05-02 PROCEDURE — 3079F DIAST BP 80-89 MM HG: CPT | Performed by: NURSE PRACTITIONER

## 2024-05-02 PROCEDURE — 99213 OFFICE O/P EST LOW 20 MIN: CPT | Performed by: NURSE PRACTITIONER

## 2024-05-02 PROCEDURE — 3074F SYST BP LT 130 MM HG: CPT | Performed by: NURSE PRACTITIONER

## 2024-05-02 ASSESSMENT — PATIENT HEALTH QUESTIONNAIRE - PHQ9
1. LITTLE INTEREST OR PLEASURE IN DOING THINGS: NOT AT ALL
8. MOVING OR SPEAKING SO SLOWLY THAT OTHER PEOPLE COULD HAVE NOTICED. OR THE OPPOSITE, BEING SO FIGETY OR RESTLESS THAT YOU HAVE BEEN MOVING AROUND A LOT MORE THAN USUAL: NOT AT ALL
7. TROUBLE CONCENTRATING ON THINGS, SUCH AS READING THE NEWSPAPER OR WATCHING TELEVISION: NOT AT ALL
SUM OF ALL RESPONSES TO PHQ QUESTIONS 1-9: 0
2. FEELING DOWN, DEPRESSED OR HOPELESS: NOT AT ALL
SUM OF ALL RESPONSES TO PHQ9 QUESTIONS 1 & 2: 0
3. TROUBLE FALLING OR STAYING ASLEEP: NOT AT ALL
1. LITTLE INTEREST OR PLEASURE IN DOING THINGS: NOT AT ALL
SUM OF ALL RESPONSES TO PHQ QUESTIONS 1-9: 0
10. IF YOU CHECKED OFF ANY PROBLEMS, HOW DIFFICULT HAVE THESE PROBLEMS MADE IT FOR YOU TO DO YOUR WORK, TAKE CARE OF THINGS AT HOME, OR GET ALONG WITH OTHER PEOPLE: NOT DIFFICULT AT ALL
SUM OF ALL RESPONSES TO PHQ QUESTIONS 1-9: 0
4. FEELING TIRED OR HAVING LITTLE ENERGY: NOT AT ALL
SUM OF ALL RESPONSES TO PHQ QUESTIONS 1-9: 0
SUM OF ALL RESPONSES TO PHQ9 QUESTIONS 1 & 2: 0
2. FEELING DOWN, DEPRESSED OR HOPELESS: NOT AT ALL
SUM OF ALL RESPONSES TO PHQ QUESTIONS 1-9: 0
5. POOR APPETITE OR OVEREATING: NOT AT ALL
6. FEELING BAD ABOUT YOURSELF - OR THAT YOU ARE A FAILURE OR HAVE LET YOURSELF OR YOUR FAMILY DOWN: NOT AT ALL
9. THOUGHTS THAT YOU WOULD BE BETTER OFF DEAD, OR OF HURTING YOURSELF: NOT AT ALL
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
## 2024-05-02 NOTE — PATIENT INSTRUCTIONS
Follow up with Guerda about an appointment, please call the bariatric line at [phone].  Appointments are offered in person and virtual at no charge.    Non-Negotiable's:     HYDRATION: aim for 64+ oz of water or other sugar-free, non-carbonated drink     2.   PROTEIN: 60+ grams per day from \"real food\" (lean meats, low-fat dairy, eggs, legumes, fish/seafood, nuts and seeds (limited amounts). 20 grams at meals and 10-15 grams at a snack. The bulk of protein intake should come from food and not supplements (shakes, bars, protein \"chips\", etc).     3.   VITAMINS: take your Bariatric approved vitamins every day     4.   ACTIVITY: moving your body is dickey in supporting a healthier lifestyle. Walk, bike, swim, dance, strength train, etc...move your body daily throughout the day    5.   SLEEP: sleep is critical for your health and wellbeing. Aim for 7 hours per night. Get into a healthy sleep routine and put the screens away (phone, TV, ipad, computer, etc). Avoid screen time a few hours before bed to help fall asleep. Try reading, listening to a book, podcast, or music, meditation, prayer, and or a hot bath before bed. Your room should be calming, quiet, dark, and cool to promote a good night's sleep.        Avoid eating in your bedroom or at your desk. All food should be plated, eaten at the table, and pay attention = mindful eating.

## 2024-05-03 NOTE — PROGRESS NOTES
Chief Complaint   Patient presents with    Follow-up     4 months s/p Gastric Bypass       HPI: Citlali Yip presents today for bariatric surgery follow-up care.  She said she is doing \"better\".  Sounds to be a little more structured with her eating.  She missed her dietary appointment she said no one ever called her.    4 months status post: [x] Gastric bypass for treatment of morbid obesity   [] Sleeve gastrectomy for treatment of morbid obesity   [] Conversion sleeve to gastric bypass   [] Conversion lap band to gastric bypass   [] SAD-I  [] DS  [] Revision      Presents today for [x] Obesity management   [] Weight regain   [] Abdominal pain   [] Medication management       [x] No fever or chills, chest pain or shortness of breath.    Pain:  No   Yes, Location        Taking any pain relieving medications No          No Meeting protein goals (60 grams minimum per day)  [] Shakes [] Bars [x] Other while she does not aim for protein in every meal she is not meeting her protein goals and is not tracking says she is eating less cereal.   Yes Meeting hydration goals (64 oz minimum daily)    [x] Water (doing much better with fluid intake) [] Juices [] Sugar-free add-ins [] Electrolyte drink/mix    No Tracking intake        Bowels moving  [x] Most days   [] Few times per week       Constipated   No    Using laxatives  No      Nausea   No    Regurgitation   No      Vitamin compliance  Yes      [x] Bariatric MVI 45 mg iron  [] Other vitamin regimen   [x] Calcium Citrate 1200 -1500 mg daily in divided doses   [] B12 injections 1000 mcg/ml   [] Prescription D2 50,000 iu  [] Weekly       [] Twice weekly       [] Three times weekly       [] Every 14 days       [] Monthly      Activity   Yes    The patient's reported level of exercise: Some walking but nothing formal.      Sleep quality  [x] Good most nights and feel rested     [] Poor most nights and do not feel rested     [] CPAP / BiPap     Skin Concerns Yes some excess

## 2024-05-06 ENCOUNTER — TELEPHONE (OUTPATIENT)
Age: 39
End: 2024-05-06

## 2024-05-10 ENCOUNTER — OFFICE VISIT (OUTPATIENT)
Age: 39
End: 2024-05-10
Payer: MEDICAID

## 2024-05-10 VITALS
BODY MASS INDEX: 41.95 KG/M2 | DIASTOLIC BLOOD PRESSURE: 83 MMHG | OXYGEN SATURATION: 100 % | TEMPERATURE: 97.7 F | WEIGHT: 244.4 LBS | RESPIRATION RATE: 16 BRPM | SYSTOLIC BLOOD PRESSURE: 143 MMHG | HEART RATE: 80 BPM

## 2024-05-10 DIAGNOSIS — R03.0 ELEVATED BLOOD PRESSURE READING WITHOUT DIAGNOSIS OF HYPERTENSION: ICD-10-CM

## 2024-05-10 DIAGNOSIS — Z02.89 ENCOUNTER FOR COMPLETION OF FORM WITH PATIENT: ICD-10-CM

## 2024-05-10 DIAGNOSIS — F41.1 GAD (GENERALIZED ANXIETY DISORDER): ICD-10-CM

## 2024-05-10 DIAGNOSIS — F33.0 MILD EPISODE OF RECURRENT MAJOR DEPRESSIVE DISORDER (HCC): Primary | ICD-10-CM

## 2024-05-10 DIAGNOSIS — R73.01 IMPAIRED FASTING GLUCOSE: ICD-10-CM

## 2024-05-10 DIAGNOSIS — E78.2 MIXED HYPERLIPIDEMIA: ICD-10-CM

## 2024-05-10 DIAGNOSIS — F43.12 CHRONIC POST-TRAUMATIC STRESS DISORDER (PTSD): ICD-10-CM

## 2024-05-10 PROCEDURE — 99214 OFFICE O/P EST MOD 30 MIN: CPT | Performed by: NURSE PRACTITIONER

## 2024-05-10 RX ORDER — ATORVASTATIN CALCIUM 10 MG/1
10 TABLET, FILM COATED ORAL DAILY
Qty: 90 TABLET | Refills: 1 | Status: SHIPPED | OUTPATIENT
Start: 2024-05-10

## 2024-05-10 RX ORDER — HYDROXYZINE 50 MG/1
50 TABLET, FILM COATED ORAL DAILY PRN
Qty: 30 TABLET | Refills: 1 | Status: SHIPPED | OUTPATIENT
Start: 2024-05-10 | End: 2024-06-09

## 2024-05-10 RX ORDER — SERTRALINE HYDROCHLORIDE 100 MG/1
200 TABLET, FILM COATED ORAL DAILY
Qty: 180 TABLET | Refills: 1 | Status: SHIPPED | OUTPATIENT
Start: 2024-05-10 | End: 2024-08-08

## 2024-05-10 NOTE — PROGRESS NOTES
Chief Complaint   Patient presents with    Hypertension    Hyperlipidemia    Forms       \"Have you been to the ER, urgent care clinic since your last visit?  Hospitalized since your last visit?\"    NO    “Have you seen or consulted any other health care providers outside of [facility] since your last visit?”    NO      Click Here for Release of Records Request          5/2/2024     9:09 AM   PHQ-9    Little interest or pleasure in doing things 0   Feeling down, depressed, or hopeless 0   Trouble falling or staying asleep, or sleeping too much 0   Feeling tired or having little energy 0   Poor appetite or overeating 0   Feeling bad about yourself - or that you are a failure or have let yourself or your family down 0   Trouble concentrating on things, such as reading the newspaper or watching television 0   Moving or speaking so slowly that other people could have noticed. Or the opposite - being so fidgety or restless that you have been moving around a lot more than usual 0   Thoughts that you would be better off dead, or of hurting yourself in some way 0   PHQ-2 Score 0   PHQ-9 Total Score 0   If you checked off any problems, how difficult have these problems made it for you to do your work, take care of things at home, or get along with other people? 0       Health Maintenance Due   Topic Date Due    COVID-19 Vaccine (3 - 2023-24 season) 09/01/2023    Lipids  05/17/2024

## 2024-05-10 NOTE — PROGRESS NOTES
[facility]  Clinic Note     Citlali Yip (: 1985) is a 38 y.o. female, established patient, here for evaluation of the following chief complaint(s):  Hypertension, Hyperlipidemia, and Forms       ASSESSMENT/PLAN:    1. Mild episode of recurrent major depressive disorder (HCC)  -     sertraline (ZOLOFT) 100 MG tablet; Take 2 tablets by mouth daily, Disp-180 tablet, R-1Normal    2. JOHN (generalized anxiety disorder)  -     sertraline (ZOLOFT) 100 MG tablet; Take 2 tablets by mouth daily, Disp-180 tablet, R-1Normal  -     hydrOXYzine HCl (ATARAX) 50 MG tablet; Take 1 tablet by mouth daily as needed for Anxiety, Disp-30 tablet, R-1Normal    3. Chronic post-traumatic stress disorder (PTSD)  -     sertraline (ZOLOFT) 100 MG tablet; Take 2 tablets by mouth daily, Disp-180 tablet, R-1Normal    4. Elevated blood pressure reading without diagnosis of hypertension  The patient's blood pressure has historically been well-regulated. The patient is advised to monitor her blood pressure at home, with a few readings throughout the week. She is instructed to sit for approximately 10 minutes before checking her blood pressure. After 3 to 5 days' worth of readings, she will send these readings to [facility]. The possibility of initiating medication was discussed.    BP Readings from Last 3 Encounters:   05/10/24 (!) 143/83   24 127/81   24 139/83         5. Mixed hyperlipidemia  -     atorvastatin (LIPITOR) 10 MG tablet; Take 1 tablet by mouth daily, Disp-90 tablet, R-1Normal  -     Lipid Panel; Future  Lab Results   Component Value Date    CHOL 157 2023    TRIG 91 2023    HDL 44 2023    LDL 96 2023    VLDL 17 2023         6. Impaired fasting glucose  -     Hemoglobin A1C; Future  Hemoglobin A1C   Date Value Ref Range Status   2023 5.5 4.8 - 5.6 % Final       7. Encounter for completion of form with patient  -Paperwork completed, and the patient will be

## 2024-05-11 ENCOUNTER — HOSPITAL ENCOUNTER (EMERGENCY)
Facility: HOSPITAL | Age: 39
Discharge: HOME | End: 2024-05-11
Attending: STUDENT IN AN ORGANIZED HEALTH CARE EDUCATION/TRAINING PROGRAM
Payer: MEDICAID

## 2024-05-11 VITALS
BODY MASS INDEX: 41.93 KG/M2 | OXYGEN SATURATION: 99 % | SYSTOLIC BLOOD PRESSURE: 158 MMHG | RESPIRATION RATE: 16 BRPM | HEIGHT: 64 IN | WEIGHT: 245.59 LBS | DIASTOLIC BLOOD PRESSURE: 99 MMHG | HEART RATE: 86 BPM | TEMPERATURE: 96.8 F

## 2024-05-11 DIAGNOSIS — B02.9 HERPES ZOSTER WITHOUT COMPLICATION: Primary | ICD-10-CM

## 2024-05-11 LAB
APPEARANCE UR: ABNORMAL
BACTERIA URNS QL MICRO: ABNORMAL /HPF
BILIRUB UR QL: NEGATIVE
COLOR UR: ABNORMAL
COMMENT:: NORMAL
EPITH CASTS URNS QL MICRO: ABNORMAL /LPF
GLUCOSE UR STRIP.AUTO-MCNC: NEGATIVE MG/DL
HCG UR QL: NEGATIVE
HGB UR QL STRIP: ABNORMAL
KETONES UR QL STRIP.AUTO: NEGATIVE MG/DL
LEUKOCYTE ESTERASE UR QL STRIP.AUTO: NEGATIVE
MUCOUS THREADS URNS QL MICRO: ABNORMAL /LPF
NITRITE UR QL STRIP.AUTO: NEGATIVE
PH UR STRIP: 6.5 (ref 5–8)
PROT UR STRIP-MCNC: 30 MG/DL
RBC #/AREA URNS HPF: ABNORMAL /HPF (ref 0–5)
SP GR UR REFRACTOMETRY: 1.02 (ref 1–1.03)
SPECIMEN HOLD: NORMAL
SPECIMEN HOLD: NORMAL
UROBILINOGEN UR QL STRIP.AUTO: 1 EU/DL (ref 0.2–1)
WBC URNS QL MICRO: ABNORMAL /HPF (ref 0–4)

## 2024-05-11 PROCEDURE — 81001 URINALYSIS AUTO W/SCOPE: CPT

## 2024-05-11 PROCEDURE — 36415 COLL VENOUS BLD VENIPUNCTURE: CPT

## 2024-05-11 PROCEDURE — 99283 EMERGENCY DEPT VISIT LOW MDM: CPT

## 2024-05-11 PROCEDURE — 81025 URINE PREGNANCY TEST: CPT

## 2024-05-11 RX ORDER — HYDROCODONE BITARTRATE AND ACETAMINOPHEN 5; 325 MG/1; MG/1
1 TABLET ORAL EVERY 4 HOURS PRN
Qty: 30 TABLET | Refills: 0 | Status: SHIPPED | OUTPATIENT
Start: 2024-05-11 | End: 2024-05-16

## 2024-05-11 RX ORDER — VALACYCLOVIR HYDROCHLORIDE 1 G/1
1000 TABLET, FILM COATED ORAL 3 TIMES DAILY
Qty: 21 TABLET | Refills: 0 | Status: SHIPPED | OUTPATIENT
Start: 2024-05-11 | End: 2024-05-18

## 2024-05-11 RX ORDER — KETOROLAC TROMETHAMINE 10 MG/1
10 TABLET, FILM COATED ORAL EVERY 6 HOURS PRN
Qty: 16 TABLET | Refills: 0 | Status: SHIPPED | OUTPATIENT
Start: 2024-05-11 | End: 2024-05-11

## 2024-05-11 ASSESSMENT — PAIN SCALES - GENERAL: PAINLEVEL_OUTOF10: 8

## 2024-05-11 ASSESSMENT — PAIN DESCRIPTION - ORIENTATION: ORIENTATION: LOWER

## 2024-05-11 ASSESSMENT — PAIN DESCRIPTION - LOCATION: LOCATION: BACK

## 2024-05-11 NOTE — ED TRIAGE NOTES
Pt arrives with lower back pain as well as some perineal discomfort that has been on and off for a few days. Pt reports also being on her menses currently.

## 2024-05-11 NOTE — ED PROVIDER NOTES
HPI    38 y.o. female presenting with painful rash on her right buttock and right labia over the past several days, has not had similar symptoms in the past.  She currently started her menstrual period about 3 days ago.  Denies vaginal discharge, dysuria.  She had chickenpox as a child.    Citlali Yip   has a past medical history of Anxiety and depression, CPAP (continuous positive airway pressure) dependence, Hypercholesterolemia, Hypertension, and Sleep apnea.       Physical Exam  Vitals reviewed.   Constitutional:       General: She is not in acute distress.     Appearance: Normal appearance.   HENT:      Head: Normocephalic.      Mouth/Throat:      Mouth: Mucous membranes are moist.   Eyes:      Extraocular Movements: Extraocular movements intact.      Pupils: Pupils are equal, round, and reactive to light.   Cardiovascular:      Pulses: Normal pulses.   Pulmonary:      Effort: Pulmonary effort is normal. No respiratory distress.   Abdominal:      General: Abdomen is flat.   Musculoskeletal:      Cervical back: Neck supple. No rigidity.      Right lower leg: No edema.      Left lower leg: No edema.   Skin:     General: Skin is warm.      Capillary Refill: Capillary refill takes less than 2 seconds.      Comments: Chaperone present throughout the exam.  Patient has a vesicular eruption over the right buttock and scattered vesicle along the right labia majora.   Neurological:      General: No focal deficit present.      Mental Status: She is alert. Mental status is at baseline.   Psychiatric:         Mood and Affect: Mood normal.           LABORATORY TESTS:  Labs Reviewed   URINALYSIS - Abnormal; Notable for the following components:       Result Value    Appearance CLOUDY (*)     Protein, UA 30 (*)     Blood, Urine LARGE (*)     All other components within normal limits   URINALYSIS, MICRO - Abnormal; Notable for the following components:    BACTERIA, URINE 2+ (*)     Mucus, UA TRACE (*)     All other  components within normal limits   URINE CULTURE HOLD SAMPLE   EXTRA TUBES HOLD   POC PREGNANCY UR-QUAL   POC PREGNANCY UR-QUAL       IMAGING RESULTS:  No orders to display       MEDICATIONS GIVEN:  Medications - No data to display    CONSULTS:  None         Medical Decision Making  Unilateral dermatomal eruption of vesicular rash in a immunocompetent patient most likely shingles, started on antivirals, no evidence of disseminated disease, in that case would require admission but she does not meet this criteria.  She has no chronic medical issues.  Will follow-up with primary care, pain is moderate at present.    Amount and/or Complexity of Data Reviewed  Labs: ordered.              IMPRESSION:  1. Herpes zoster without complication           DISPOSITION: Decision To Discharge 05/11/2024 08:48:22 AM      PATIENT REFERRED TO:  Michela Sin, APRN - NP  [address] [facility] [address] [phone]    Schedule an appointment as soon as possible for a visit         DISCHARGE MEDICATIONS:  New Prescriptions    KETOROLAC (TORADOL) 10 MG TABLET    Take 1 tablet by mouth every 6 hours as needed for Pain Do not take for more than 4 days    VALACYCLOVIR (VALTREX) 1 G TABLET    Take 1 tablet by mouth 3 times daily for 7 days       Dony Brice MD      Please note that this dictation was completed with [employer], the computer voice recognition software.  Quite often unanticipated grammatical, syntax, homophones, and other interpretive errors are inadvertently transcribed by the computer software.  Please disregard these errors.  Please excuse any errors that have escaped final proofreading.     Dony Brice MD  05/11/24 3902

## 2024-05-14 ENCOUNTER — NURSE ONLY (OUTPATIENT)
Age: 39
End: 2024-05-14

## 2024-05-14 ENCOUNTER — TELEPHONE (OUTPATIENT)
Age: 39
End: 2024-05-14

## 2024-05-14 DIAGNOSIS — E78.2 MIXED HYPERLIPIDEMIA: ICD-10-CM

## 2024-05-14 DIAGNOSIS — R73.01 IMPAIRED FASTING GLUCOSE: ICD-10-CM

## 2024-05-14 LAB
CHOLEST SERPL-MCNC: 150 MG/DL
EST. AVERAGE GLUCOSE BLD GHB EST-MCNC: 85 MG/DL
HBA1C MFR BLD: 4.6 % (ref 4–5.6)
HDLC SERPL-MCNC: 67 MG/DL
HDLC SERPL: 2.2 (ref 0–5)
LDLC SERPL CALC-MCNC: 69.6 MG/DL (ref 0–100)
TRIGL SERPL-MCNC: 67 MG/DL
VLDLC SERPL CALC-MCNC: 13.4 MG/DL

## 2024-05-14 NOTE — TELEPHONE ENCOUNTER
Patient called stated she went to the ER on Saturday and was diiagnose with shingles and she is having a lot back pain. If there is and pain medication she can take.    Requesting a call back    Best call back #[phone]

## 2024-05-15 NOTE — TELEPHONE ENCOUNTER
Called patient. Two patient identifiers verified. Informed pt of MICHELLE Sin's recommendation. Pt said she cannot take NSAIDs because she has had bariatric surgery. Wants to know if there is another option for muscle pain. Informed her another message would be sent but an appointment may be required to discuss further. Pt verbalized understanding.

## 2024-05-21 ENCOUNTER — PATIENT MESSAGE (OUTPATIENT)
Age: 39
End: 2024-05-21

## 2024-05-21 ENCOUNTER — TELEPHONE (OUTPATIENT)
Age: 39
End: 2024-05-21

## 2024-05-28 ENCOUNTER — TELEPHONE (OUTPATIENT)
Age: 39
End: 2024-05-28

## 2024-05-28 ENCOUNTER — OFFICE VISIT (OUTPATIENT)
Age: 39
End: 2024-05-28

## 2024-05-28 VITALS — WEIGHT: 241 LBS | BODY MASS INDEX: 41.37 KG/M2

## 2024-05-28 DIAGNOSIS — E66.01 MORBID OBESITY (HCC): Primary | ICD-10-CM

## 2024-05-28 NOTE — PROGRESS NOTES
Post-Operative Bariatric Nutrition Counseling     Physician/Surgeon:Iram Mazariegos N.P.   Name: Citlali Yip  : 1985        Reason for visit: Follow up nutrition education and counseling post gastric bypass      ASSESSMENT:  Date of surgery:2023    Medications/Supplements:   Prior to Admission medications    Medication Sig Start Date End Date Taking? Authorizing Provider   sertraline (ZOLOFT) 100 MG tablet Take 2 tablets by mouth daily 5/10/24 8/8/24  Michela Sin APRN - NP   hydrOXYzine HCl (ATARAX) 50 MG tablet Take 1 tablet by mouth daily as needed for Anxiety 5/10/24 6/9/24  Michela Sin APRN - NP   atorvastatin (LIPITOR) 10 MG tablet Take 1 tablet by mouth daily 5/10/24   Michela Sin APRN - NP   VITAMIN D3 50 MCG ( UT) CAPS capsule TAKE 1 CAPSULE BY MOUTH TWICE DAILY 3/12/24   Michela Sin APRN - NP   diclofenac sodium (VOLTAREN) 1 % GEL Apply 4 g topically 4 times daily 3/8/24   Michela Sin APRN - NP   Biotin (BIOTIN 5000) 5 MG CAPS Take by mouth    Lakesha Newby MD   ferrous fumarate-vitamin c (MATHEW-SEQUELS) 65-25 MG TBCR CR tablet Take 1 tablet by mouth daily (with breakfast)    Lakesha Newby MD   Multiple Vitamins-Minerals (BARIATRIC MULTIVITAMINS/IRON PO) Take by mouth    Lakesha Newby MD   Calcium Citrate-Vitamin D (CALCIUM CITRATE + D PO) Take 600 mg by mouth 2 times daily Liquid calcium citrate 600 mg per Tbsp. Taking bid    Lakesha Newby MD   levonorgestrel (MIRENA) IUD 52 mg 1 Intra Uterine Device by IntraUTERine route once    Automatic Reconciliation, Ar       Pt takes recommended supplements as prescribed:   Multivitamin: yes   Calcium Citrate:yes        Food Allergies/Intolerances: none    Anthropometrics:     Ht:64\"   Today's Office Wt :241#     Pre-op wt: 316#     Net Wt Loss: 75# (23%)   IBW: 120#  %IBW: 200%   BMI: 41 Category: obesity III    Reported wt history: Pt presents for post-op nutrition counseling 6 months s/p

## 2024-05-28 NOTE — TELEPHONE ENCOUNTER
Patient LVM to cancel appointment and will call back to reschedule. Called patient and LVM that we received her voicemail and to call the office back to reschedule.
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
## 2024-07-17 DIAGNOSIS — M25.511 ACUTE PAIN OF RIGHT SHOULDER: ICD-10-CM

## 2024-07-17 DIAGNOSIS — M25.561 ACUTE PAIN OF RIGHT KNEE: ICD-10-CM

## 2024-07-17 NOTE — TELEPHONE ENCOUNTER
Last appointment: 5/10/24 [place]  Next appointment: None  Previous refill encounter(s): 3/8/24 450g + 1    Requested Prescriptions     Pending Prescriptions Disp Refills    diclofenac sodium (VOLTAREN) 1 % GEL [Pharmacy Med Name: DICLOFENAC 1% GEL 100GM (RX)] 450 g 1     Sig: Apply 4 grams topically to affected area four times a day     For Pharmacy Admin Tracking Only    Program: Medication Refill  CPA in place:    Recommendation Provided To:   Intervention Detail: New Rx: 1, reason: Patient Preference  Intervention Accepted By:   Gap Closed?:    Time Spent (min): 5

## 2024-08-05 ENCOUNTER — OFFICE VISIT (OUTPATIENT)
Age: 39
End: 2024-08-05
Payer: MEDICAID

## 2024-08-05 VITALS
SYSTOLIC BLOOD PRESSURE: 137 MMHG | HEIGHT: 64 IN | HEART RATE: 69 BPM | OXYGEN SATURATION: 99 % | BODY MASS INDEX: 41.17 KG/M2 | RESPIRATION RATE: 16 BRPM | WEIGHT: 241.16 LBS | DIASTOLIC BLOOD PRESSURE: 79 MMHG | TEMPERATURE: 98 F

## 2024-08-05 DIAGNOSIS — E66.01 MORBID OBESITY WITH BMI OF 40.0-44.9, ADULT (HCC): Primary | ICD-10-CM

## 2024-08-05 DIAGNOSIS — K91.2 POSTOPERATIVE INTESTINAL MALABSORPTION: ICD-10-CM

## 2024-08-05 PROCEDURE — 3075F SYST BP GE 130 - 139MM HG: CPT | Performed by: NURSE PRACTITIONER

## 2024-08-05 PROCEDURE — 3078F DIAST BP <80 MM HG: CPT | Performed by: NURSE PRACTITIONER

## 2024-08-05 PROCEDURE — 99213 OFFICE O/P EST LOW 20 MIN: CPT | Performed by: NURSE PRACTITIONER

## 2024-08-05 RX ORDER — PHENTERMINE AND TOPIRAMATE 3.75; 23 MG/1; MG/1
3.75-23 CAPSULE, EXTENDED RELEASE ORAL DAILY
Qty: 14 CAPSULE | Refills: 0 | Status: SHIPPED | OUTPATIENT
Start: 2024-08-05 | End: 2024-08-19

## 2024-08-05 RX ORDER — PHENTERMINE AND TOPIRAMATE 7.5; 46 MG/1; MG/1
7.5-46 CAPSULE, EXTENDED RELEASE ORAL DAILY
Qty: 30 CAPSULE | Refills: 0 | Status: SHIPPED | OUTPATIENT
Start: 2024-08-20 | End: 2024-09-19

## 2024-08-05 ASSESSMENT — PATIENT HEALTH QUESTIONNAIRE - PHQ9
SUM OF ALL RESPONSES TO PHQ9 QUESTIONS 1 & 2: 0
2. FEELING DOWN, DEPRESSED OR HOPELESS: NOT AT ALL
SUM OF ALL RESPONSES TO PHQ QUESTIONS 1-9: 0
1. LITTLE INTEREST OR PLEASURE IN DOING THINGS: NOT AT ALL
SUM OF ALL RESPONSES TO PHQ QUESTIONS 1-9: 0

## 2024-08-05 NOTE — PROGRESS NOTES
Identified patient with two patient identifiers (name and ). Reviewed chart in preparation for visit and have obtained necessary documentation.    Citlali Yip is a 38 y.o. female  Chief Complaint   Patient presents with    Follow-up     Ht 1.626 m (5' 4.02\")   Wt 109.4 kg (241 lb 2.6 oz)   BMI 41.37 kg/m²     1. Have you been to the ER, urgent care clinic since your last visit?  Hospitalized since your last visit?no    2. Have you seen or consulted any other health care providers outside of the [facility] since your last visit?  Include any pap smears or colon screening. no

## 2024-08-05 NOTE — PATIENT INSTRUCTIONS
Qsymia (phentermine / topiramate ER) is a combination weight-loss medication. Common Qsymia side effects include numbness and tingling, dry mouth, and constipation. Dizziness, taste changes, and insomnia are also possible. Qsymia may cause trouble thinking clearly or memory issues for some people.     Qsymia 3.75-23 mg Take 1 pill every morning. Avoid taking after 12pm. Takee the starter dose once daily for 14 days and then increase dose to 7.5-46 mg daily.        SIDE EFFECTS: elevated heart rate and blood pressure, dry mouth, constipation, anxiety / jittery feeling, difficulty sleeping     Important to monitor your blood pressure, heart rate, and weight while taking the medication     It is important to combine the medication with diet, exercise, and stress management.     Sub in non-food activities for mindless eating (stress eating)  Daily walking or other structured exercise  Increase fluids- aim for 64 oz per day  Measure meals to 1 cup measure     Follow up with me in about 4-6 weeks.     If you have any questions, call [phone]       Non-Negotiable's:     HYDRATION: aim for 64+ oz of water or other sugar-free, non-carbonated drink     2.   PROTEIN: 60+ grams per day from \"real food\" (lean meats, low-fat dairy, eggs, legumes, fish/seafood, nuts and seeds (limited amounts). 20 grams at meals and 10-15 grams at a snack. The bulk of protein intake should come from food and not supplements (shakes, bars, protein \"chips\", etc).     3.   VITAMINS: take your Bariatric approved vitamins every day     4.   ACTIVITY: moving your body is dickey in supporting a healthier lifestyle. Walk, bike, swim, dance, strength train, etc...move your body daily throughout the day    5.   SLEEP: sleep is critical for your health and wellbeing. Aim for 7 hours per night. Get into a healthy sleep routine and put the screens away (phone, TV, ipad, computer, etc). Avoid screen time a few hours before bed to help fall asleep. Try reading,

## 2024-08-12 ENCOUNTER — OFFICE VISIT (OUTPATIENT)
Age: 39
End: 2024-08-12

## 2024-08-12 DIAGNOSIS — E66.01 MORBID OBESITY (HCC): Primary | ICD-10-CM

## 2024-08-12 NOTE — PROGRESS NOTES
Post-Operative Bariatric Nutrition Counseling - Follow Up Phone Consult     Physician/Surgeon:Iram Mazariegos N.P.   Name: Citlali Yip               : 1985                                    Reason for visit: Follow up nutrition education and counseling post gastric bypass        ASSESSMENT:  Date of surgery:2023     Medications/Supplements:   Home Medications           Prior to Admission medications    Medication Sig Start Date End Date Taking? Authorizing Provider   sertraline (ZOLOFT) 100 MG tablet Take 2 tablets by mouth daily 5/10/24 8/8/24   Michela Sin APRN - NP   hydrOXYzine HCl (ATARAX) 50 MG tablet Take 1 tablet by mouth daily as needed for Anxiety 5/10/24 6/9/24   Michela Sin APRN - NP   atorvastatin (LIPITOR) 10 MG tablet Take 1 tablet by mouth daily 5/10/24     Michela Sin APRN - NP   VITAMIN D3 50 MCG (2000) CAPS capsule TAKE 1 CAPSULE BY MOUTH TWICE DAILY 3/12/24     Michela Sin APRN - NP   diclofenac sodium (VOLTAREN) 1 % GEL Apply 4 g topically 4 times daily 3/8/24     Michela Sin APRN - NP   Biotin (BIOTIN 5000) 5 MG CAPS Take by mouth       Lakesha Newby MD   ferrous fumarate-vitamin c (MATHEW-SEQUELS) 65-25 MG TBCR CR tablet Take 1 tablet by mouth daily (with breakfast)       Lakesha Newby MD   Multiple Vitamins-Minerals (BARIATRIC MULTIVITAMINS/IRON PO) Take by mouth       Lakesha Newby MD   Calcium Citrate-Vitamin D (CALCIUM CITRATE + D PO) Take 600 mg by mouth 2 times daily Liquid calcium citrate 600 mg per Tbsp. Taking bid       Lakesha Newby MD   levonorgestrel (MIRENA) IUD 52 mg 1 Intra Uterine Device by IntraUTERine route once       Automatic Reconciliation, Ar            Pt takes recommended supplements as prescribed:              Multivitamin: yes              Calcium Citrate:yes           Food Allergies/Intolerances: none     Anthropometrics:          Ht:64\"   Recent Office Wt (24): 241#  Previous Office Wt

## 2024-09-20 RX ORDER — OMEPRAZOLE 40 MG/1
40 CAPSULE, DELAYED RELEASE ORAL
Qty: 90 CAPSULE | Refills: 1 | OUTPATIENT
Start: 2024-09-20

## 2024-09-24 ENCOUNTER — TELEPHONE (OUTPATIENT)
Age: 39
End: 2024-09-24

## 2024-09-25 ENCOUNTER — OFFICE VISIT (OUTPATIENT)
Age: 39
End: 2024-09-25
Payer: MEDICAID

## 2024-09-25 VITALS
SYSTOLIC BLOOD PRESSURE: 162 MMHG | HEART RATE: 77 BPM | HEIGHT: 64 IN | RESPIRATION RATE: 12 BRPM | DIASTOLIC BLOOD PRESSURE: 110 MMHG | BODY MASS INDEX: 40.67 KG/M2 | TEMPERATURE: 98.2 F | OXYGEN SATURATION: 100 % | WEIGHT: 238.2 LBS

## 2024-09-25 DIAGNOSIS — I10 UNCONTROLLED HYPERTENSION: Primary | ICD-10-CM

## 2024-09-25 DIAGNOSIS — E78.2 MIXED HYPERLIPIDEMIA: ICD-10-CM

## 2024-09-25 DIAGNOSIS — Z98.84 S/P GASTRIC BYPASS: ICD-10-CM

## 2024-09-25 DIAGNOSIS — S39.012A BACK STRAIN, INITIAL ENCOUNTER: ICD-10-CM

## 2024-09-25 PROCEDURE — 99214 OFFICE O/P EST MOD 30 MIN: CPT | Performed by: NURSE PRACTITIONER

## 2024-09-25 PROCEDURE — 3078F DIAST BP <80 MM HG: CPT | Performed by: NURSE PRACTITIONER

## 2024-09-25 PROCEDURE — 3077F SYST BP >= 140 MM HG: CPT | Performed by: NURSE PRACTITIONER

## 2024-09-25 RX ORDER — AMLODIPINE BESYLATE 10 MG/1
10 TABLET ORAL DAILY
Qty: 90 TABLET | Refills: 0 | Status: SHIPPED | OUTPATIENT
Start: 2024-09-25

## 2024-09-25 RX ORDER — CYCLOBENZAPRINE HCL 10 MG
10 TABLET ORAL 3 TIMES DAILY PRN
Qty: 21 TABLET | Refills: 0 | Status: SHIPPED | OUTPATIENT
Start: 2024-09-25 | End: 2024-10-05

## 2024-09-25 RX ORDER — ATORVASTATIN CALCIUM 10 MG/1
10 TABLET, FILM COATED ORAL DAILY
Qty: 90 TABLET | Refills: 1 | Status: SHIPPED | OUTPATIENT
Start: 2024-09-25

## 2024-09-25 SDOH — ECONOMIC STABILITY: FOOD INSECURITY: WITHIN THE PAST 12 MONTHS, YOU WORRIED THAT YOUR FOOD WOULD RUN OUT BEFORE YOU GOT MONEY TO BUY MORE.: NEVER TRUE

## 2024-09-25 SDOH — ECONOMIC STABILITY: INCOME INSECURITY: HOW HARD IS IT FOR YOU TO PAY FOR THE VERY BASICS LIKE FOOD, HOUSING, MEDICAL CARE, AND HEATING?: NOT HARD AT ALL

## 2024-09-25 SDOH — ECONOMIC STABILITY: FOOD INSECURITY: WITHIN THE PAST 12 MONTHS, THE FOOD YOU BOUGHT JUST DIDN'T LAST AND YOU DIDN'T HAVE MONEY TO GET MORE.: NEVER TRUE

## 2024-09-25 ASSESSMENT — PATIENT HEALTH QUESTIONNAIRE - PHQ9
7. TROUBLE CONCENTRATING ON THINGS, SUCH AS READING THE NEWSPAPER OR WATCHING TELEVISION: NOT AT ALL
4. FEELING TIRED OR HAVING LITTLE ENERGY: NOT AT ALL
6. FEELING BAD ABOUT YOURSELF - OR THAT YOU ARE A FAILURE OR HAVE LET YOURSELF OR YOUR FAMILY DOWN: NOT AT ALL
SUM OF ALL RESPONSES TO PHQ QUESTIONS 1-9: 0
2. FEELING DOWN, DEPRESSED OR HOPELESS: NOT AT ALL
10. IF YOU CHECKED OFF ANY PROBLEMS, HOW DIFFICULT HAVE THESE PROBLEMS MADE IT FOR YOU TO DO YOUR WORK, TAKE CARE OF THINGS AT HOME, OR GET ALONG WITH OTHER PEOPLE: NOT DIFFICULT AT ALL
SUM OF ALL RESPONSES TO PHQ QUESTIONS 1-9: 0
SUM OF ALL RESPONSES TO PHQ QUESTIONS 1-9: 0
9. THOUGHTS THAT YOU WOULD BE BETTER OFF DEAD, OR OF HURTING YOURSELF: NOT AT ALL
1. LITTLE INTEREST OR PLEASURE IN DOING THINGS: NOT AT ALL
3. TROUBLE FALLING OR STAYING ASLEEP: NOT AT ALL
SUM OF ALL RESPONSES TO PHQ9 QUESTIONS 1 & 2: 0
SUM OF ALL RESPONSES TO PHQ QUESTIONS 1-9: 0
8. MOVING OR SPEAKING SO SLOWLY THAT OTHER PEOPLE COULD HAVE NOTICED. OR THE OPPOSITE, BEING SO FIGETY OR RESTLESS THAT YOU HAVE BEEN MOVING AROUND A LOT MORE THAN USUAL: NOT AT ALL
5. POOR APPETITE OR OVEREATING: NOT AT ALL

## 2024-10-03 DIAGNOSIS — S39.012A BACK STRAIN, INITIAL ENCOUNTER: ICD-10-CM

## 2024-10-03 RX ORDER — CYCLOBENZAPRINE HCL 10 MG
10 TABLET ORAL 3 TIMES DAILY PRN
Qty: 21 TABLET | Refills: 0 | Status: CANCELLED | OUTPATIENT
Start: 2024-10-03 | End: 2024-10-13

## 2024-10-04 RX ORDER — CYCLOBENZAPRINE HCL 10 MG
10 TABLET ORAL 3 TIMES DAILY PRN
Qty: 21 TABLET | Refills: 0 | OUTPATIENT
Start: 2024-10-04

## 2024-10-14 RX ORDER — ACETAMINOPHEN 160 MG
2000 TABLET,DISINTEGRATING ORAL 2 TIMES DAILY
Qty: 60 CAPSULE | Refills: 0 | OUTPATIENT
Start: 2024-10-14

## 2024-10-14 RX ORDER — ACETAMINOPHEN 160 MG
2000 TABLET,DISINTEGRATING ORAL 2 TIMES DAILY
Qty: 60 CAPSULE | Refills: 0 | Status: SHIPPED | OUTPATIENT
Start: 2024-10-14

## 2024-10-14 NOTE — TELEPHONE ENCOUNTER
PCP: Michela Sin APRN - NP    Last appt: 9/25/2024   Future Appointments   Date Time Provider Department Center   10/16/2024  3:30 PM Michela Sin APRN - NP [facility]   10/31/2024  9:00 AM Iram Mazariegos APRN - NP [facility]       Requested Prescriptions     Pending Prescriptions Disp Refills    VITAMIN D3 50 MCG (2000 UT) CAPS capsule [Pharmacy Med Name: VITAMIN D3 2,000UNIT CAPSULES] 180 capsule 1     Sig: TAKE 1 CAPSULE BY MOUTH TWICE DAILY         Prior labs and Blood pressures:  BP Readings from Last 3 Encounters:   09/25/24 (!) 162/110   08/05/24 137/79   05/11/24 (!) 158/99     Lab Results   Component Value Date/Time     04/16/2024 09:17 AM    K 3.7 04/16/2024 09:17 AM     04/16/2024 09:17 AM    CO2 30 04/16/2024 09:17 AM    BUN 8 04/16/2024 09:17 AM    GFRAA 133 09/11/2020 12:00 AM     No results found for: \"HBA1C\", \"[id number]\"  Lab Results   Component Value Date/Time    CHOL 150 05/14/2024 08:50 AM    HDL 67 05/14/2024 08:50 AM    LDL 69.6 05/14/2024 08:50 AM    LDL 96 05/17/2023 12:00 AM    VLDL 13.4 05/14/2024 08:50 AM    VLDL 17 12/28/2022 12:00 AM     No results found for: \"VITD3\"    Lab Results   Component Value Date/Time    TSH 1.85 10/10/2023 08:49 AM

## 2024-11-01 ENCOUNTER — OFFICE VISIT (OUTPATIENT)
Age: 39
End: 2024-11-01
Payer: MEDICAID

## 2024-11-01 VITALS
OXYGEN SATURATION: 99 % | RESPIRATION RATE: 12 BRPM | HEART RATE: 88 BPM | SYSTOLIC BLOOD PRESSURE: 139 MMHG | DIASTOLIC BLOOD PRESSURE: 76 MMHG | TEMPERATURE: 97.3 F | HEIGHT: 64 IN | BODY MASS INDEX: 39.98 KG/M2 | WEIGHT: 234.2 LBS

## 2024-11-01 DIAGNOSIS — F43.12 CHRONIC POST-TRAUMATIC STRESS DISORDER (PTSD): ICD-10-CM
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
## 2024-11-01 DIAGNOSIS — Z09 ENCOUNTER FOR EXAMINATION FOLLOWING TREATMENT AT HOSPITAL: Primary | ICD-10-CM

## 2024-11-01 DIAGNOSIS — I10 UNCONTROLLED HYPERTENSION: ICD-10-CM

## 2024-11-01 PROCEDURE — 99214 OFFICE O/P EST MOD 30 MIN: CPT | Performed by: NURSE PRACTITIONER

## 2024-11-01 RX ORDER — METHOCARBAMOL 500 MG/1
500 TABLET, FILM COATED ORAL 4 TIMES DAILY
COMMUNITY
End: 2024-11-01

## 2024-11-01 RX ORDER — METHOCARBAMOL 750 MG/1
750 TABLET, FILM COATED ORAL 3 TIMES DAILY
Qty: 30 TABLET | Refills: 0 | Status: SHIPPED | OUTPATIENT
Start: 2024-11-01 | End: 2024-11-11
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
## 2024-11-04 ENCOUNTER — OFFICE VISIT (OUTPATIENT)
Age: 39
End: 2024-11-04
Payer: MEDICAID

## 2024-11-04 ENCOUNTER — PATIENT MESSAGE (OUTPATIENT)
Age: 39
End: 2024-11-04

## 2024-11-04 VITALS
RESPIRATION RATE: 24 BRPM | WEIGHT: 233 LBS | DIASTOLIC BLOOD PRESSURE: 87 MMHG | TEMPERATURE: 97.9 F | BODY MASS INDEX: 39.78 KG/M2 | OXYGEN SATURATION: 99 % | HEART RATE: 86 BPM | HEIGHT: 64 IN | SYSTOLIC BLOOD PRESSURE: 138 MMHG

## 2024-11-04 DIAGNOSIS — E66.01 SEVERE OBESITY (BMI 35.0-39.9) WITH COMORBIDITY: Primary | ICD-10-CM

## 2024-11-04 DIAGNOSIS — I10 ESSENTIAL HYPERTENSION: ICD-10-CM

## 2024-11-04 DIAGNOSIS — K91.2 POSTOPERATIVE INTESTINAL MALABSORPTION: ICD-10-CM

## 2024-11-04 DIAGNOSIS — T73.0XXD HUNGRY, SUBSEQUENT ENCOUNTER: ICD-10-CM

## 2024-11-04 DIAGNOSIS — E66.01 SEVERE OBESITY (BMI 35.0-39.9) WITH COMORBIDITY: ICD-10-CM

## 2024-11-04 DIAGNOSIS — F43.22 ADJUSTMENT DISORDER WITH ANXIETY: Primary | ICD-10-CM

## 2024-11-04 PROCEDURE — 90837 PSYTX W PT 60 MINUTES: CPT | Performed by: SOCIAL WORKER

## 2024-11-04 PROCEDURE — 3075F SYST BP GE 130 - 139MM HG: CPT | Performed by: NURSE PRACTITIONER

## 2024-11-04 PROCEDURE — 99213 OFFICE O/P EST LOW 20 MIN: CPT | Performed by: NURSE PRACTITIONER

## 2024-11-04 PROCEDURE — 3079F DIAST BP 80-89 MM HG: CPT | Performed by: NURSE PRACTITIONER

## 2024-11-04 RX ORDER — TOPIRAMATE 50 MG/1
25 TABLET, FILM COATED ORAL 2 TIMES DAILY
Qty: 90 TABLET | OUTPATIENT
Start: 2024-11-04

## 2024-11-04 RX ORDER — TOPIRAMATE 50 MG/1
25 TABLET, FILM COATED ORAL 2 TIMES DAILY
Qty: 30 TABLET | Refills: 1 | Status: SHIPPED | OUTPATIENT
Start: 2024-11-04

## 2024-11-04 ASSESSMENT — PATIENT HEALTH QUESTIONNAIRE - PHQ9
SUM OF ALL RESPONSES TO PHQ9 QUESTIONS 1 & 2: 0
SUM OF ALL RESPONSES TO PHQ QUESTIONS 1-9: 0
SUM OF ALL RESPONSES TO PHQ QUESTIONS 1-9: 0
1. LITTLE INTEREST OR PLEASURE IN DOING THINGS: NOT AT ALL
SUM OF ALL RESPONSES TO PHQ QUESTIONS 1-9: 0
SUM OF ALL RESPONSES TO PHQ QUESTIONS 1-9: 0
2. FEELING DOWN, DEPRESSED OR HOPELESS: NOT AT ALL

## 2024-11-04 NOTE — PROGRESS NOTES
Identified patient with two patient identifiers (name and ). Reviewed chart in preparation for visit and have obtained necessary documentation.    Citlali Yip is a 39 y.o. female  Chief Complaint   Patient presents with    Follow-up    Weight Management     Yearly Bariatric     /87 (Site: Right Upper Arm, Position: Sitting, Cuff Size: Large Adult)   Pulse 86   Temp 97.9 °F (36.6 °C) (Oral)   Resp 24   Ht 1.626 m (5' 4\")   Wt 105.7 kg (233 lb)   LMP 10/25/2024   SpO2 99%   BMI 39.99 kg/m²     1. Have you been to the ER, urgent care clinic since your last visit?  Hospitalized since your last visit?yes - Pt. Reports unrelated ER visit.     2. Have you seen or consulted any other health care providers outside of the [facility] since your last visit?  Include any pap smears or colon screening. no   
protein goals   [x] Exercise: encouraged to perform at least 30 mins of at least low-moderate-intensity physical activity on 5 or more days a week. The activity can be undertaken in one session or several lasting 10 mins or more. Use of a wearable fitness device may help meet activity goals and was recommended.   [x] Sleep hygiene reviewed   [x] Support group  Follow-up with dietician   Follow-up with bariatric provider 3 months  Citlali Yip verbalized understanding and questions were answered to the best of my knowledge and ability.    Diet, activity and mindfulness educational materials were provided.   19 minutes spent in face to face with Citlali Yip > 50% counseling.
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
## 2024-11-04 NOTE — PATIENT INSTRUCTIONS
To help with food cravings and thoughts, start Topiramate 50 mg take HALF tab twice daily.     Side effects: numbness and tingling in hands / feet, brain fog, vision changes     If you feel weird or just \"out of it\", STOP the medication     Non-Negotiable's:     HYDRATION: aim for 64+ oz of water or other sugar-free, non-carbonated drink     2.   PROTEIN: 60+ grams per day from \"real food\" (lean meats, low-fat dairy, eggs, legumes, fish/seafood, nuts and seeds (limited amounts). 20 grams at meals and 10-15 grams at a snack. The bulk of protein intake should come from food and not supplements (shakes, bars, protein \"chips\", etc).     3.   VITAMINS: take your Bariatric approved vitamins every day     4.   ACTIVITY: moving your body is dickey in supporting a healthier lifestyle. Walk, bike, swim, dance, strength train, etc...move your body daily throughout the day    5.   SLEEP: sleep is critical for your health and wellbeing. Aim for 7 hours per night. Get into a healthy sleep routine and put the screens away (phone, TV, ipad, computer, etc). Avoid screen time a few hours before bed to help fall asleep. Try reading, listening to a book, podcast, or music, meditation, prayer, and or a hot bath before bed. Your room should be calming, quiet, dark, and cool to promote a good night's sleep.        Avoid eating in your bedroom or at your desk. All food should be plated, eaten at the table, and pay attention = mindful eating.

## 2024-11-18 RX ORDER — ACETAMINOPHEN 160 MG
2000 TABLET,DISINTEGRATING ORAL DAILY
Qty: 90 CAPSULE | Refills: 1 | Status: SHIPPED | OUTPATIENT
Start: 2024-11-18

## 2024-11-18 NOTE — TELEPHONE ENCOUNTER
Last appointment: 11/1/24 [facility]  Next appointment: 11/20/24 [facility]  Previous refill encounter(s): 10/14/24 60 (by covering provider) 3/12/24 180 + 1    Requested Prescriptions     Pending Prescriptions Disp Refills    vitamin D (VITAMIN D3) 50 MCG (2000 UT) CAPS capsule 180 capsule 1     Sig: Take 1 capsule by mouth 2 times daily     For Pharmacy Admin Tracking Only    Program: Medication Refill  CPA in place:    Recommendation Provided To:   Intervention Detail: New Rx: 1, reason: Patient Preference  Intervention Accepted By:   Gap Closed?:    Time Spent (min): 5

## 2024-11-20 ENCOUNTER — TELEMEDICINE (OUTPATIENT)
Age: 39
End: 2024-11-20

## 2024-11-20 DIAGNOSIS — F43.12 CHRONIC POST-TRAUMATIC STRESS DISORDER (PTSD): Primary | ICD-10-CM
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
## 2024-11-20 DIAGNOSIS — M54.2 CERVICALGIA: ICD-10-CM

## 2024-11-20 DIAGNOSIS — F41.0 PANIC ATTACK: ICD-10-CM

## 2024-11-20 DIAGNOSIS — F41.9 ANXIETY AND DEPRESSION: ICD-10-CM

## 2024-11-20 DIAGNOSIS — F32.A ANXIETY AND DEPRESSION: ICD-10-CM

## 2024-11-20 DIAGNOSIS — F33.0 MILD EPISODE OF RECURRENT MAJOR DEPRESSIVE DISORDER (HCC): ICD-10-CM

## 2024-11-20 RX ORDER — SERTRALINE HYDROCHLORIDE 100 MG/1
200 TABLET, FILM COATED ORAL DAILY
Qty: 180 TABLET | Refills: 1 | Status: SHIPPED | OUTPATIENT
Start: 2024-11-20 | End: 2025-02-18

## 2024-11-20 RX ORDER — MENTHOL 40 MG/ML
GEL TOPICAL
Qty: 90 ML | Refills: 1 | Status: SHIPPED | OUTPATIENT
Start: 2024-11-20

## 2024-11-20 RX ORDER — GABAPENTIN 100 MG/1
100 CAPSULE ORAL NIGHTLY
Qty: 30 CAPSULE | Refills: 1 | Status: SHIPPED | OUTPATIENT
Start: 2024-11-20 | End: 2024-12-20

## 2024-11-20 RX ORDER — LIDOCAINE 4 G/G
1 PATCH TOPICAL DAILY
Qty: 30 PATCH | Refills: 0 | Status: SHIPPED | OUTPATIENT
Start: 2024-11-20 | End: 2024-12-20

## 2024-11-20 RX ORDER — ALPRAZOLAM 0.25 MG/1
0.25 TABLET ORAL DAILY PRN
Qty: 5 TABLET | Refills: 0 | Status: SHIPPED | OUTPATIENT
Start: 2024-11-20 | End: 2024-12-20

## 2024-11-20 RX ORDER — AMLODIPINE BESYLATE 10 MG/1
10 TABLET ORAL DAILY
Qty: 90 TABLET | Refills: 0 | Status: SHIPPED | OUTPATIENT
Start: 2024-11-20

## 2024-11-20 ASSESSMENT — ENCOUNTER SYMPTOMS: BACK PAIN: 1

## 2024-11-20 NOTE — PROGRESS NOTES
[facility]  Virtual Clinic Note    Citlali Yip (:  1985) is a Established patient, presenting virtually for evaluation of the following:      ASSESSMENT & PLAN:    1. Chronic post-traumatic stress disorder (PTSD)  - Continue working w/ LCSW  -     gabapentin (NEURONTIN) 100 MG capsule; Take 1 capsule by mouth nightly for 30 days. Intended supply: 30 days Max Daily Amount: 100 mg, Disp-30 capsule, R-1Normal  -     ALPRAZolam (XANAX) 0.25 MG tablet; Take 1 tablet by mouth daily as needed for Anxiety (panic) for up to 30 days. Max Daily Amount: 0.25 mg, Disp-5 tablet, R-0Normal  -     sertraline (ZOLOFT) 100 MG tablet; Take 2 tablets by mouth daily, Disp-180 tablet, R-1Normal    2. Mild episode of recurrent major depressive disorder (HCC)  -     sertraline (ZOLOFT) 100 MG tablet; Take 2 tablets by mouth daily, Disp-180 tablet, R-1Normal    3. Anxiety and depression  -     gabapentin (NEURONTIN) 100 MG capsule; Take 1 capsule by mouth nightly for 30 days. Intended supply: 30 days Max Daily Amount: 100 mg, Disp-30 capsule, R-1Normal  -     ALPRAZolam (XANAX) 0.25 MG tablet; Take 1 tablet by mouth daily as needed for Anxiety (panic) for up to 30 days. Max Daily Amount: 0.25 mg, Disp-5 tablet, R-0Normal  -     sertraline (ZOLOFT) 100 MG tablet; Take 2 tablets by mouth daily, Disp-180 tablet, R-1Normal    4. Cervicalgia  - Minimal improvement  - Unable to take NSAIDs due to gastric bypass  -     lidocaine 4 % external patch; Place 1 patch onto the skin daily, TransDERmal, DAILY Starting 2024, Until 2024, For 30 days, Disp-30 patch, R-0, Normal  -     gabapentin (NEURONTIN) 100 MG capsule; Take 1 capsule by mouth nightly for 30 days. Intended supply: 30 days Max Daily Amount: 100 mg, Disp-30 capsule, R-1Normal  -     Menthol, Topical Analgesic, (BIOFREEZE ROLL-ON) 4 % GEL; Apply up to 4x/day prn, Disp-90 mL, R-1Normal  -     BS - Physical Therapy at [facility],

## 2024-11-27 ENCOUNTER — TELEPHONE (OUTPATIENT)
Age: 39
End: 2024-11-27

## 2024-12-04 ENCOUNTER — HOSPITAL ENCOUNTER (OUTPATIENT)
Facility: HOSPITAL | Age: 39
Setting detail: RECURRING SERIES
Discharge: HOME | End: 2024-12-07
Payer: MEDICAID

## 2024-12-04 DIAGNOSIS — Z02.71 DISABILITY EXAMINATION: Primary | ICD-10-CM

## 2024-12-04 DIAGNOSIS — E66.01 SEVERE OBESITY (BMI 35.0-39.9) WITH COMORBIDITY: ICD-10-CM

## 2024-12-04 DIAGNOSIS — T73.0XXD HUNGRY, SUBSEQUENT ENCOUNTER: ICD-10-CM

## 2024-12-04 PROCEDURE — 97110 THERAPEUTIC EXERCISES: CPT | Performed by: PHYSICAL THERAPIST

## 2024-12-04 PROCEDURE — 97140 MANUAL THERAPY 1/> REGIONS: CPT | Performed by: PHYSICAL THERAPIST

## 2024-12-04 PROCEDURE — 97161 PT EVAL LOW COMPLEX 20 MIN: CPT | Performed by: PHYSICAL THERAPIST
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
## 2024-12-05 RX ORDER — TOPIRAMATE 50 MG/1
25 TABLET, FILM COATED ORAL 2 TIMES DAILY
Qty: 30 TABLET | Refills: 2 | Status: SHIPPED | OUTPATIENT
Start: 2024-12-05

## 2024-12-09 ENCOUNTER — APPOINTMENT (OUTPATIENT)
Facility: HOSPITAL | Age: 39
End: 2024-12-09
Payer: MEDICAID

## 2024-12-11 ENCOUNTER — HOSPITAL ENCOUNTER (OUTPATIENT)
Facility: HOSPITAL | Age: 39
Setting detail: RECURRING SERIES
Discharge: HOME | End: 2024-12-14
Payer: MEDICAID

## 2024-12-11 PROCEDURE — 97110 THERAPEUTIC EXERCISES: CPT
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
## 2024-12-16 ENCOUNTER — HOSPITAL ENCOUNTER (OUTPATIENT)
Facility: HOSPITAL | Age: 39
Setting detail: RECURRING SERIES
Discharge: HOME | End: 2024-12-19
Payer: MEDICAID

## 2024-12-16 PROCEDURE — 97110 THERAPEUTIC EXERCISES: CPT
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
## 2024-12-18 ENCOUNTER — APPOINTMENT (OUTPATIENT)
Facility: HOSPITAL | Age: 39
End: 2024-12-18
Payer: MEDICAID

## 2024-12-18 ENCOUNTER — HOSPITAL ENCOUNTER (OUTPATIENT)
Facility: HOSPITAL | Age: 39
Setting detail: RECURRING SERIES
Discharge: HOME | End: 2024-12-21
Payer: MEDICAID

## 2024-12-18 PROCEDURE — 97110 THERAPEUTIC EXERCISES: CPT | Performed by: PHYSICAL THERAPIST
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
## 2024-12-19 ENCOUNTER — OFFICE VISIT (OUTPATIENT)
Age: 39
End: 2024-12-19
Payer: MEDICAID

## 2024-12-19 ENCOUNTER — TELEPHONE (OUTPATIENT)
Age: 39
End: 2024-12-19

## 2024-12-19 VITALS
TEMPERATURE: 97.5 F | HEIGHT: 64 IN | OXYGEN SATURATION: 100 % | DIASTOLIC BLOOD PRESSURE: 70 MMHG | HEART RATE: 79 BPM | SYSTOLIC BLOOD PRESSURE: 112 MMHG | BODY MASS INDEX: 39.27 KG/M2 | RESPIRATION RATE: 12 BRPM | WEIGHT: 230 LBS

## 2024-12-19 DIAGNOSIS — Z00.00 ENCOUNTER FOR WELL ADULT EXAM WITHOUT ABNORMAL FINDINGS: Primary | ICD-10-CM

## 2024-12-19 DIAGNOSIS — R30.0 DYSURIA: ICD-10-CM

## 2024-12-19 DIAGNOSIS — N64.4 BREAST PAIN, RIGHT: ICD-10-CM

## 2024-12-19 LAB
ALBUMIN SERPL-MCNC: 3.7 G/DL (ref 3.5–5)
ALBUMIN/GLOB SERPL: 0.9 (ref 1.1–2.2)
ALP SERPL-CCNC: 82 U/L (ref 45–117)
ALT SERPL-CCNC: 38 U/L (ref 12–78)
ANION GAP SERPL CALC-SCNC: 5 MMOL/L (ref 2–12)
AST SERPL-CCNC: 32 U/L (ref 15–37)
BASOPHILS # BLD: 0.1 K/UL (ref 0–0.1)
BASOPHILS NFR BLD: 1 % (ref 0–1)
BILIRUB SERPL-MCNC: 0.5 MG/DL (ref 0.2–1)
BILIRUBIN, URINE, POC: NEGATIVE
BLOOD URINE, POC: NORMAL
BUN SERPL-MCNC: 10 MG/DL (ref 6–20)
BUN/CREAT SERPL: 14 (ref 12–20)
CALCIUM SERPL-MCNC: 8.6 MG/DL (ref 8.5–10.1)
CHLORIDE SERPL-SCNC: 108 MMOL/L (ref 97–108)
CO2 SERPL-SCNC: 25 MMOL/L (ref 21–32)
CREAT SERPL-MCNC: 0.69 MG/DL (ref 0.55–1.02)
DIFFERENTIAL METHOD BLD: NORMAL
EOSINOPHIL # BLD: 0.1 K/UL (ref 0–0.4)
EOSINOPHIL NFR BLD: 1 % (ref 0–7)
ERYTHROCYTE [DISTWIDTH] IN BLOOD BY AUTOMATED COUNT: 13.2 % (ref 11.5–14.5)
EST. AVERAGE GLUCOSE BLD GHB EST-MCNC: 82 MG/DL
GLOBULIN SER CALC-MCNC: 4 G/DL (ref 2–4)
GLUCOSE SERPL-MCNC: 94 MG/DL (ref 65–100)
GLUCOSE URINE, POC: NEGATIVE
HBA1C MFR BLD: 4.5 % (ref 4–5.6)
HCT VFR BLD AUTO: 35.4 % (ref 35–47)
HGB BLD-MCNC: 11.9 G/DL (ref 11.5–16)
IMM GRANULOCYTES # BLD AUTO: 0 K/UL (ref 0–0.04)
IMM GRANULOCYTES NFR BLD AUTO: 0 % (ref 0–0.5)
KETONES, URINE, POC: NEGATIVE
LEUKOCYTE ESTERASE, URINE, POC: NEGATIVE
LYMPHOCYTES # BLD: 1.9 K/UL (ref 0.8–3.5)
LYMPHOCYTES NFR BLD: 33 % (ref 12–49)
MCH RBC QN AUTO: 31.3 PG (ref 26–34)
MCHC RBC AUTO-ENTMCNC: 33.6 G/DL (ref 30–36.5)
MCV RBC AUTO: 93.2 FL (ref 80–99)
MONOCYTES # BLD: 0.4 K/UL (ref 0–1)
MONOCYTES NFR BLD: 7 % (ref 5–13)
NEUTS SEG # BLD: 3.4 K/UL (ref 1.8–8)
NEUTS SEG NFR BLD: 58 % (ref 32–75)
NITRITE, URINE, POC: NEGATIVE
NRBC # BLD: 0 K/UL (ref 0–0.01)
NRBC BLD-RTO: 0 PER 100 WBC
PH, URINE, POC: 6.5 (ref 4.6–8)
PLATELET # BLD AUTO: 236 K/UL (ref 150–400)
PMV BLD AUTO: 10.8 FL (ref 8.9–12.9)
POTASSIUM SERPL-SCNC: 3.8 MMOL/L (ref 3.5–5.1)
PROT SERPL-MCNC: 7.7 G/DL (ref 6.4–8.2)
PROTEIN,URINE, POC: NEGATIVE
RBC # BLD AUTO: 3.8 M/UL (ref 3.8–5.2)
SODIUM SERPL-SCNC: 138 MMOL/L (ref 136–145)
SPECIFIC GRAVITY, URINE, POC: 1.02 (ref 1–1.03)
T4 FREE SERPL-MCNC: 0.8 NG/DL (ref 0.8–1.5)
TSH SERPL DL<=0.05 MIU/L-ACNC: 1.43 UIU/ML (ref 0.36–3.74)
URINALYSIS CLARITY, POC: NORMAL
URINALYSIS COLOR, POC: YELLOW
UROBILINOGEN, POC: NORMAL MG/DL
WBC # BLD AUTO: 5.8 K/UL (ref 3.6–11)

## 2024-12-19 PROCEDURE — 81002 URINALYSIS NONAUTO W/O SCOPE: CPT | Performed by: NURSE PRACTITIONER

## 2024-12-19 PROCEDURE — 99213 OFFICE O/P EST LOW 20 MIN: CPT | Performed by: NURSE PRACTITIONER

## 2024-12-19 PROCEDURE — 99395 PREV VISIT EST AGE 18-39: CPT | Performed by: NURSE PRACTITIONER

## 2024-12-19 RX ORDER — SULFAMETHOXAZOLE AND TRIMETHOPRIM 800; 160 MG/1; MG/1
1 TABLET ORAL DAILY
COMMUNITY

## 2024-12-19 SDOH — ECONOMIC STABILITY: FOOD INSECURITY: WITHIN THE PAST 12 MONTHS, YOU WORRIED THAT YOUR FOOD WOULD RUN OUT BEFORE YOU GOT MONEY TO BUY MORE.: NEVER TRUE

## 2024-12-19 SDOH — ECONOMIC STABILITY: INCOME INSECURITY: HOW HARD IS IT FOR YOU TO PAY FOR THE VERY BASICS LIKE FOOD, HOUSING, MEDICAL CARE, AND HEATING?: NOT HARD AT ALL

## 2024-12-19 SDOH — ECONOMIC STABILITY: FOOD INSECURITY: WITHIN THE PAST 12 MONTHS, THE FOOD YOU BOUGHT JUST DIDN'T LAST AND YOU DIDN'T HAVE MONEY TO GET MORE.: NEVER TRUE

## 2024-12-19 ASSESSMENT — PATIENT HEALTH QUESTIONNAIRE - PHQ9
6. FEELING BAD ABOUT YOURSELF - OR THAT YOU ARE A FAILURE OR HAVE LET YOURSELF OR YOUR FAMILY DOWN: NOT AT ALL
10. IF YOU CHECKED OFF ANY PROBLEMS, HOW DIFFICULT HAVE THESE PROBLEMS MADE IT FOR YOU TO DO YOUR WORK, TAKE CARE OF THINGS AT HOME, OR GET ALONG WITH OTHER PEOPLE: NOT DIFFICULT AT ALL
SUM OF ALL RESPONSES TO PHQ9 QUESTIONS 1 & 2: 0
1. LITTLE INTEREST OR PLEASURE IN DOING THINGS: NOT AT ALL
7. TROUBLE CONCENTRATING ON THINGS, SUCH AS READING THE NEWSPAPER OR WATCHING TELEVISION: NOT AT ALL
4. FEELING TIRED OR HAVING LITTLE ENERGY: NOT AT ALL
3. TROUBLE FALLING OR STAYING ASLEEP: NOT AT ALL
2. FEELING DOWN, DEPRESSED OR HOPELESS: NOT AT ALL
5. POOR APPETITE OR OVEREATING: NOT AT ALL
SUM OF ALL RESPONSES TO PHQ QUESTIONS 1-9: 0
SUM OF ALL RESPONSES TO PHQ QUESTIONS 1-9: 0
9. THOUGHTS THAT YOU WOULD BE BETTER OFF DEAD, OR OF HURTING YOURSELF: NOT AT ALL
SUM OF ALL RESPONSES TO PHQ QUESTIONS 1-9: 0
8. MOVING OR SPEAKING SO SLOWLY THAT OTHER PEOPLE COULD HAVE NOTICED. OR THE OPPOSITE, BEING SO FIGETY OR RESTLESS THAT YOU HAVE BEEN MOVING AROUND A LOT MORE THAN USUAL: NOT AT ALL
SUM OF ALL RESPONSES TO PHQ QUESTIONS 1-9: 0

## 2024-12-19 NOTE — TELEPHONE ENCOUNTER
Notified Zenobia at [facility], ph# [phone], pt will not not be pursuing referral for disability exam at [facility] at this time

## 2024-12-19 NOTE — PROGRESS NOTES
Chief Complaint   Patient presents with    Annual Exam         \"Have you been to the ER, urgent care clinic since your last visit?  Hospitalized since your last visit?\"    Urgent Care last week - back pain    “Have you seen or consulted any other health care providers outside of [facility] since your last visit?”    No             Click Here for Release of Records Request           12/19/2024     8:03 AM   PHQ-9    Little interest or pleasure in doing things 0   Feeling down, depressed, or hopeless 0   PHQ-2 Score 0   PHQ-9 Total Score 0           Financial Resource Strain: Low Risk  (12/19/2024)    Overall Financial Resource Strain (CARDIA)     Difficulty of Paying Living Expenses: Not hard at all      Food Insecurity: No Food Insecurity (12/19/2024)    Hunger Vital Sign     Worried About Running Out of Food in the Last Year: Never true     Ran Out of Food in the Last Year: Never true          Health Maintenance Due   Topic Date Due    Flu vaccine (1) 08/01/2024    COVID-19 Vaccine (3 - 2023-24 season) 09/01/2024

## 2024-12-19 NOTE — TELEPHONE ENCOUNTER
After talking to the patient, they will not be doing the Functional Capacity Exam through [employer] because they do not have workers comp and this exam is needed to apply for disability.     Pt is talking to agent to have this matter sorted out another way

## 2024-12-19 NOTE — PATIENT INSTRUCTIONS
hands, brush your teeth twice a day, and wear a seat belt in the car.   Where can you learn more?  Go to https://www.[employer].net/patientEd and enter P072 to learn more about \"Well Visit, Ages 18 to 65: Care Instructions.\"  Current as of: August 6, 2023  Content Version: 14.2  © 2024 [employer].   Care instructions adapted under license by [employer]. If you have questions about a medical condition or this instruction, always ask your healthcare professional. [employer] disclaims any warranty or liability for your use of this information.

## 2024-12-19 NOTE — PROGRESS NOTES
[facility]  Clinic Note    Well Adult Note  Name: Citlali Yip Today’s Date: 2024   MRN: [id number] Sex: Female   Age: 39 y.o. Ethnicity: Non- / Non    : 1985 Race: Black /       .       Subjective   History:  Citlali Yip is here for a well adult exam. While here she has requested UTI check. Was seen at urgent care for low back pain. They prescribed an abx and muscle relaxer. Additionally, she reports right sided breast pain. Previously had biopsy of right breast x 2 in  which were benign.     Review of Systems   Cardiovascular:  Positive for chest pain (right breast pain, possible lump).   All other systems reviewed and are negative.      Allergies   Allergen Reactions    Other Itching and Rash     Surgical glue      Prior to Visit Medications    Medication Sig Taking? Authorizing Provider   sulfamethoxazole-trimethoprim (BACTRIM DS;SEPTRA DS) 800-160 MG per tablet Take 1 tablet by mouth daily For 7 days Yes Provider, MD Lakesha   topiramate (TOPAMAX) 50 MG tablet TAKE 1/2 TABLET BY MOUTH TWICE DAILY Yes Iram Mazariegos APRN - NP   gabapentin (NEURONTIN) 100 MG capsule Take 1 capsule by mouth nightly for 30 days. Intended supply: 30 days Max Daily Amount: 100 mg Yes Michela Sin APRN - NP   ALPRAZolam (XANAX) 0.25 MG tablet Take 1 tablet by mouth daily as needed for Anxiety (panic) for up to 30 days. Max Daily Amount: 0.25 mg Yes Michela Sin APRN - NP   sertraline (ZOLOFT) 100 MG tablet Take 2 tablets by mouth daily Yes iMchela Sin APRN - NP   amLODIPine (NORVASC) 10 MG tablet Take 1 tablet by mouth daily Yes Michela Sin APRN - NP   vitamin D (VITAMIN D3) 50 MCG ( UT) CAPS capsule Take 1 capsule by mouth daily Yes Michela Sin APRN - NP   diclofenac sodium (VOLTAREN) 1 % GEL Apply 4 grams topically to affected area four times a day Yes Michela Sin APRN - NP   atorvastatin (LIPITOR) 10 MG tablet Take 1 tablet

## 2024-12-19 NOTE — TELEPHONE ENCOUNTER
Zenobia from [facility] called (Ph: [phone]); they need clarification / more info for the referral order placed 12/4/24 to [facility] for Disability Examination.    They do a 'Functional Capacity Exam' which cost ~$2000 and is only covered by Worker's [employer].    They also do a 'Movement Tolerance Test' which is not covered by insurance and the cost is ~$520    Did you want the patient to have either if these done, or a different type of assessment?    Please update referral order and re-fax to [facility]   Fax# [phone]

## 2024-12-23 ENCOUNTER — HOSPITAL ENCOUNTER (OUTPATIENT)
Facility: HOSPITAL | Age: 39
Setting detail: RECURRING SERIES
Discharge: HOME | End: 2024-12-26
Payer: MEDICAID

## 2024-12-23 PROCEDURE — 97110 THERAPEUTIC EXERCISES: CPT | Performed by: PHYSICAL THERAPIST

## 2024-12-23 NOTE — PROGRESS NOTES
1. Patient will be I in HEP to promote self management of symptoms. PROGRESSING              2. Patient will report pain level at worst as less than or equal to 7/10 so they can perform ADLs without pain. PROGRESSING   Long Term Goals: To be accomplished in 4-6 weeks:               1.  Patient will report pain level at worst as less than or equal to 5/10 so they can perform ADLs without pain.               2. Patient will have cervical flexion AROM > or = 40 degrees pain free to assist with looking down.               3. Patient will have B cervical rotation AROM > or = 60 degrees to assist with driving.      PLAN  Yes  Continue plan of care  Re-Cert Due: -  []  Upgrade activities as tolerated  []  Discharge due to:  []  Other:      Love Roberts, PT       12/23/2024       9:26 AM

## 2024-12-30 ENCOUNTER — HOSPITAL ENCOUNTER (OUTPATIENT)
Facility: HOSPITAL | Age: 39
Setting detail: RECURRING SERIES
Discharge: HOME | End: 2025-01-02
Payer: MEDICAID

## 2024-12-30 PROCEDURE — 97110 THERAPEUTIC EXERCISES: CPT
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
## 2025-01-01 DIAGNOSIS — S39.012A BACK STRAIN, INITIAL ENCOUNTER: ICD-10-CM

## 2025-01-02 NOTE — TELEPHONE ENCOUNTER
PCP: Michela Sin, APRN - NP    Last appt: 12/19/2024     Future Appointments   Date Time Provider Department Center   1/6/2025 10:00 AM Liv Newsome, [facility] BS AMB   1/7/2025  9:00 AM Josie Luis, PTA [facility]   1/14/2025  8:00 AM Iram Mazariegos, APRN - NP [facility] BS AMB   1/31/2025  9:05 AM [facility] 5 [facility]   1/31/2025  9:30 AM [facility] US 2 [facility]       Requested Prescriptions     Pending Prescriptions Disp Refills    diclofenac sodium (VOLTAREN) 1 % GEL [Pharmacy Med Name: DICLOFENAC 1% GEL 100GM (OTC)] 400 g      Sig: APPLY 4 GRAMS TOPICALLY TO THE AFFECTED AREA FOUR TIMES DAILY         Prior labs and Blood pressures:  BP Readings from Last 3 Encounters:   12/19/24 112/70   11/04/24 138/87   11/01/24 139/76     Lab Results   Component Value Date/Time     12/19/2024 08:35 AM    K 3.8 12/19/2024 08:35 AM     12/19/2024 08:35 AM    CO2 25 12/19/2024 08:35 AM    BUN 10 12/19/2024 08:35 AM    GFRAA 133 09/11/2020 12:00 AM     No results found for: \"HBA1C\", \"[id number]\"  Lab Results   Component Value Date/Time    CHOL 150 05/14/2024 08:50 AM    HDL 67 05/14/2024 08:50 AM    LDL 69.6 05/14/2024 08:50 AM    LDL 96 05/17/2023 12:00 AM    VLDL 13.4 05/14/2024 08:50 AM    VLDL 17 12/28/2022 12:00 AM     No results found for: \"VITD3\"    Lab Results   Component Value Date/Time    TSH 1.43 12/19/2024 08:35 AM

## 2025-01-06 ENCOUNTER — OFFICE VISIT (OUTPATIENT)
Age: 40
End: 2025-01-06
Payer: MEDICAID

## 2025-01-06 DIAGNOSIS — F43.22 ADJUSTMENT DISORDER WITH ANXIETY: Primary | ICD-10-CM

## 2025-01-06 PROCEDURE — 90832 PSYTX W PT 30 MINUTES: CPT | Performed by: SOCIAL WORKER
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
## 2025-01-31 ENCOUNTER — HOSPITAL ENCOUNTER (OUTPATIENT)
Facility: HOSPITAL | Age: 40
Discharge: HOME | End: 2025-01-31
Payer: MEDICAID

## 2025-01-31 ENCOUNTER — HOSPITAL ENCOUNTER (OUTPATIENT)
Facility: HOSPITAL | Age: 40
End: 2025-01-31
Payer: MEDICAID

## 2025-01-31 VITALS — HEIGHT: 64 IN | BODY MASS INDEX: 39.27 KG/M2 | WEIGHT: 230 LBS

## 2025-01-31 DIAGNOSIS — N64.4 BREAST PAIN, RIGHT: ICD-10-CM

## 2025-01-31 PROCEDURE — G0279 TOMOSYNTHESIS, MAMMO: HCPCS

## 2025-02-07 ENCOUNTER — OFFICE VISIT (OUTPATIENT)
Age: 40
End: 2025-02-07
Payer: MEDICAID

## 2025-02-07 VITALS
SYSTOLIC BLOOD PRESSURE: 122 MMHG | OXYGEN SATURATION: 99 % | HEART RATE: 73 BPM | DIASTOLIC BLOOD PRESSURE: 78 MMHG | RESPIRATION RATE: 18 BRPM | WEIGHT: 228.4 LBS | TEMPERATURE: 98 F | HEIGHT: 64 IN | BODY MASS INDEX: 38.99 KG/M2

## 2025-02-07 DIAGNOSIS — G47.33 OSA (OBSTRUCTIVE SLEEP APNEA): ICD-10-CM

## 2025-02-07 DIAGNOSIS — I10 PRIMARY HYPERTENSION: Primary | ICD-10-CM

## 2025-02-07 DIAGNOSIS — E66.01 SEVERE OBESITY (BMI 35.0-39.9) WITH COMORBIDITY: ICD-10-CM

## 2025-02-07 DIAGNOSIS — T73.0XXD HUNGRY, SUBSEQUENT ENCOUNTER: ICD-10-CM

## 2025-02-07 DIAGNOSIS — M25.561 ACUTE PAIN OF RIGHT KNEE: ICD-10-CM

## 2025-02-07 PROCEDURE — 3078F DIAST BP <80 MM HG: CPT | Performed by: NURSE PRACTITIONER

## 2025-02-07 PROCEDURE — 3074F SYST BP LT 130 MM HG: CPT | Performed by: NURSE PRACTITIONER

## 2025-02-07 PROCEDURE — 99213 OFFICE O/P EST LOW 20 MIN: CPT | Performed by: NURSE PRACTITIONER

## 2025-02-07 RX ORDER — SEMAGLUTIDE 0.25 MG/.5ML
0.25 INJECTION, SOLUTION SUBCUTANEOUS
Qty: 2 ML | Refills: 0 | Status: SHIPPED | OUTPATIENT
Start: 2025-02-07

## 2025-02-07 RX ORDER — TOPIRAMATE 50 MG/1
25 TABLET, FILM COATED ORAL 2 TIMES DAILY
Qty: 90 TABLET | Refills: 1 | Status: SHIPPED | OUTPATIENT
Start: 2025-02-07

## 2025-02-07 ASSESSMENT — PATIENT HEALTH QUESTIONNAIRE - PHQ9
8. MOVING OR SPEAKING SO SLOWLY THAT OTHER PEOPLE COULD HAVE NOTICED. OR THE OPPOSITE, BEING SO FIGETY OR RESTLESS THAT YOU HAVE BEEN MOVING AROUND A LOT MORE THAN USUAL: NOT AT ALL
SUM OF ALL RESPONSES TO PHQ QUESTIONS 1-9: 0
6. FEELING BAD ABOUT YOURSELF - OR THAT YOU ARE A FAILURE OR HAVE LET YOURSELF OR YOUR FAMILY DOWN: NOT AT ALL
SUM OF ALL RESPONSES TO PHQ QUESTIONS 1-9: 0
9. THOUGHTS THAT YOU WOULD BE BETTER OFF DEAD, OR OF HURTING YOURSELF: NOT AT ALL
SUM OF ALL RESPONSES TO PHQ9 QUESTIONS 1 & 2: 0
5. POOR APPETITE OR OVEREATING: NOT AT ALL
2. FEELING DOWN, DEPRESSED OR HOPELESS: NOT AT ALL
10. IF YOU CHECKED OFF ANY PROBLEMS, HOW DIFFICULT HAVE THESE PROBLEMS MADE IT FOR YOU TO DO YOUR WORK, TAKE CARE OF THINGS AT HOME, OR GET ALONG WITH OTHER PEOPLE: NOT DIFFICULT AT ALL
4. FEELING TIRED OR HAVING LITTLE ENERGY: NOT AT ALL
3. TROUBLE FALLING OR STAYING ASLEEP: NOT AT ALL
SUM OF ALL RESPONSES TO PHQ QUESTIONS 1-9: 0
7. TROUBLE CONCENTRATING ON THINGS, SUCH AS READING THE NEWSPAPER OR WATCHING TELEVISION: NOT AT ALL
SUM OF ALL RESPONSES TO PHQ QUESTIONS 1-9: 0
1. LITTLE INTEREST OR PLEASURE IN DOING THINGS: NOT AT ALL

## 2025-02-07 NOTE — PATIENT INSTRUCTIONS
issues  are pregnant or plan to become pregnant. Wegovy® may harm your unborn baby. You should stop using Wegovy® 2 months before you plan to become pregnant  are breastfeeding or plan to breastfeed. It is not known if Wegovy® passes into your breast milk  Tell your healthcare provider about all the medicines you take, including prescription and over-the-counter medicines, vitamins, and herbal supplements. Wegovy® may affect the way some medicines work and some medicines may affect the way Wegovy® works. Tell your healthcare provider if you are taking other medicines to treat diabetes, including sulfonylureas or insulin. Wegovy® slows stomach emptying and can affect medicines that need to pass through the stomach quickly.

## 2025-02-14 NOTE — PROGRESS NOTES
Identified pt with two pt identifiers (name and ). Reviewed chart in preparation for visit and have obtained necessary documentation.    Citlali Yip is a 39 y.o. female Follow-up (Medication management)  .    Vitals:    25 0843   BP: 122/78   Site: Left Upper Arm   Position: Sitting   Cuff Size: Large Adult   Pulse: 73   Resp: 18   Temp: 98 °F (36.7 °C)   TempSrc: Oral   SpO2: 99%   Weight: 103.6 kg (228 lb 6.4 oz)   Height: 1.626 m (5' 4\")          1. Have you been to the ER, urgent care clinic since your last visit?  Hospitalized since your last visit?  no     2. Have you seen or consulted any other health care providers outside of the [facility] since your last visit?  Include any pap smears or colon screening.  no  
10 grams with a snack (minimum goal 60g/day) including fiber rich produce as tolerated   Acid suppression therapy: [] Discontinue PPI [] Continue PPI and reassess at next appointment   [x] Avoid all NSAID'S and may take OTC Acetaminophen as directed if needed for pain   Nutrition and vitamin labs [] Ordered [] Pending [x] Reviewed   Bariatric vitamins [x] Regimen reviewed    [x] Identified strategies to meet hydration and protein goals   [x] Exercise: encouraged to perform at least 30 mins of at least low-moderate-intensity physical activity on 5 or more days a week. The activity can be undertaken in one session or several lasting 10 mins or more. Use of a wearable fitness device may help meet activity goals and was recommended.   [x] Sleep hygiene reviewed   [x] Support group  Follow-up with dietician   Follow-up with bariatric provider 4 weeks   Citlali Yip verbalized understanding and questions were answered to the best of my knowledge and ability.    Diet, activity and mindfulness educational materials were provided.   22 minutes spent in face to face with Citlali Yip > 50% counseling.

## 2025-02-18 ENCOUNTER — TELEPHONE (OUTPATIENT)
Age: 40
End: 2025-02-18

## 2025-02-18 DIAGNOSIS — E66.01 SEVERE OBESITY (BMI 35.0-39.9) WITH COMORBIDITY: Primary | ICD-10-CM

## 2025-02-18 NOTE — TELEPHONE ENCOUNTER
Prior authorization submitted for Wegovy 0.25 mg to [facility].  1 requirement listed not met patient must have an unsuccessful trial of Saxenda for 6 months without a body weight reduction of 5 %.  Will update provider for next steps.

## 2025-02-25 RX ORDER — AMLODIPINE BESYLATE 10 MG/1
10 TABLET ORAL DAILY
Qty: 90 TABLET | Refills: 0 | Status: SHIPPED | OUTPATIENT
Start: 2025-02-25

## 2025-02-25 RX ORDER — LIRAGLUTIDE 6 MG/ML
INJECTION, SOLUTION SUBCUTANEOUS
Qty: 15 ADJUSTABLE DOSE PRE-FILLED PEN SYRINGE | Refills: 1 | Status: SHIPPED | OUTPATIENT
Start: 2025-02-25 | End: 2025-04-23

## 2025-02-25 NOTE — TELEPHONE ENCOUNTER
Saxenda with ramp up dosing and needles sent to pharmacy. Needs brief appt with me to demonstrate how to administer once she has the medication.

## 2025-02-25 NOTE — TELEPHONE ENCOUNTER
PCP: Michela Sin, APRN - NP    Last appt: 12/19/2024     No future appointments.    Requested Prescriptions     Pending Prescriptions Disp Refills    amLODIPine (NORVASC) 10 MG tablet [Pharmacy Med Name: AMLODIPINE BESYLATE 10MG TABLETS] 90 tablet 0     Sig: TAKE 1 TABLET BY MOUTH DAILY         Prior labs and Blood pressures:  BP Readings from Last 3 Encounters:   02/07/25 122/78   12/19/24 112/70   11/04/24 138/87     Lab Results   Component Value Date/Time     12/19/2024 08:35 AM    K 3.8 12/19/2024 08:35 AM     12/19/2024 08:35 AM    CO2 25 12/19/2024 08:35 AM    BUN 10 12/19/2024 08:35 AM    GFRAA 133 09/11/2020 12:00 AM     Lab Results   Component Value Date/Time    CHOL 150 05/14/2024 08:50 AM    HDL 67 05/14/2024 08:50 AM    LDL 69.6 05/14/2024 08:50 AM    LDL 96 05/17/2023 12:00 AM    VLDL 13.4 05/14/2024 08:50 AM    VLDL 17 12/28/2022 12:00 AM     Lab Results   Component Value Date/Time    TSH 1.43 12/19/2024 08:35 AM

## 2025-02-26 NOTE — TELEPHONE ENCOUNTER
Prior authorization submitted for Saxenda in EPIC denied. Reason for denial attached below.  Reconsideration faxed with confirmation.  Will update provider and patient once response is received.

## 2025-03-17 ENCOUNTER — OFFICE VISIT (OUTPATIENT)
Age: 40
End: 2025-03-17
Payer: MEDICAID

## 2025-03-17 VITALS
HEART RATE: 77 BPM | OXYGEN SATURATION: 98 % | TEMPERATURE: 97.7 F | DIASTOLIC BLOOD PRESSURE: 88 MMHG | HEIGHT: 64 IN | SYSTOLIC BLOOD PRESSURE: 138 MMHG | WEIGHT: 230 LBS | RESPIRATION RATE: 16 BRPM | BODY MASS INDEX: 39.27 KG/M2

## 2025-03-17 DIAGNOSIS — E66.01 SEVERE OBESITY (BMI 35.0-39.9) WITH COMORBIDITY: Primary | ICD-10-CM

## 2025-03-17 PROCEDURE — 3075F SYST BP GE 130 - 139MM HG: CPT | Performed by: NURSE PRACTITIONER

## 2025-03-17 PROCEDURE — 99213 OFFICE O/P EST LOW 20 MIN: CPT | Performed by: NURSE PRACTITIONER

## 2025-03-17 PROCEDURE — 3079F DIAST BP 80-89 MM HG: CPT | Performed by: NURSE PRACTITIONER

## 2025-03-17 RX ORDER — ONDANSETRON 4 MG/1
4 TABLET, ORALLY DISINTEGRATING ORAL EVERY 8 HOURS PRN
Qty: 30 TABLET | Refills: 1 | Status: SHIPPED | OUTPATIENT
Start: 2025-03-17

## 2025-03-17 ASSESSMENT — PATIENT HEALTH QUESTIONNAIRE - PHQ9
2. FEELING DOWN, DEPRESSED OR HOPELESS: NOT AT ALL
SUM OF ALL RESPONSES TO PHQ QUESTIONS 1-9: 0
1. LITTLE INTEREST OR PLEASURE IN DOING THINGS: NOT AT ALL
SUM OF ALL RESPONSES TO PHQ QUESTIONS 1-9: 0

## 2025-03-17 NOTE — PROGRESS NOTES
Identified patient with two patient identifiers (name and ). Reviewed chart in preparation for visit and have obtained necessary documentation.    Citlali Yip is a 39 y.o. female  Chief Complaint   Patient presents with    Weight Management     1 year 4 months s/p lap gastric bypass     /88 (BP Site: Left Upper Arm, Patient Position: Sitting, BP Cuff Size: Large Adult)   Pulse 77   Temp 97.7 °F (36.5 °C) (Oral)   Resp 16   Ht 1.626 m (5' 4\")   Wt 104.3 kg (230 lb)   SpO2 98%   BMI 39.48 kg/m²     1. Have you been to the ER, urgent care clinic since your last visit?  Hospitalized since your last visit?no    2. Have you seen or consulted any other health care providers outside of the [facility] since your last visit?  Include any pap smears or colon screening. no  
Regimen reviewed    [x] Identified strategies to meet hydration and protein goals   [x] Exercise: encouraged to perform at least 30 mins of at least low-moderate-intensity physical activity on 5 or more days a week. The activity can be undertaken in one session or several lasting 10 mins or more. Use of a wearable fitness device may help meet activity goals and was recommended.   [x] Sleep hygiene reviewed   [x] Support group  Follow-up with dietician   Follow-up with bariatric provider 4 to 6 weeks   Citlali Yip verbalized understanding and questions were answered to the best of my knowledge and ability.    Diet, activity and mindfulness educational materials were provided.   18 minutes spent in face to face with Citlali Yip > 50% counseling.

## 2025-03-17 NOTE — PATIENT INSTRUCTIONS
Saxenda® slows stomach emptying and can affect medicines that need to pass through the stomach quickly.

## 2025-03-25 DIAGNOSIS — S39.012A BACK STRAIN, INITIAL ENCOUNTER: ICD-10-CM

## 2025-03-25 NOTE — TELEPHONE ENCOUNTER
PCP: Michela Sin, APRN - NP    Last appt: 12/19/2024     Future Appointments   Date Time Provider Department Center   3/26/2025 10:40 AM Margot Pike PT [facility] BS AMB   3/31/2025  9:20 AM Zaira Conroy PT [facility] AMB   4/2/2025  9:20 AM Zaira Conroy PT [facility] BS AMB   4/21/2025  9:00 AM Iram Mazariegos APRN - NP [facility] BS AMB       Requested Prescriptions     Pending Prescriptions Disp Refills    diclofenac sodium (VOLTAREN) 1 %  g 2     Sig: APPLY 4 GRAMS TOPICALLY TO THE AFFECTED AREA FOUR TIMES DAILY       Prior labs and Blood pressures:  BP Readings from Last 3 Encounters:   03/17/25 138/88   02/07/25 122/78   12/19/24 112/70     Lab Results   Component Value Date/Time     12/19/2024 08:35 AM    K 3.8 12/19/2024 08:35 AM     12/19/2024 08:35 AM    CO2 25 12/19/2024 08:35 AM    BUN 10 12/19/2024 08:35 AM    GFRAA 133 09/11/2020 12:00 AM     No results found for: \"HBA1C\", \"[id number]\"  Lab Results   Component Value Date/Time    CHOL 150 05/14/2024 08:50 AM    HDL 67 05/14/2024 08:50 AM    LDL 69.6 05/14/2024 08:50 AM    LDL 96 05/17/2023 12:00 AM    VLDL 13.4 05/14/2024 08:50 AM    VLDL 17 12/28/2022 12:00 AM     No results found for: \"VITD3\"    Lab Results   Component Value Date/Time    TSH 1.43 12/19/2024 08:35 AM

## 2025-04-18 ENCOUNTER — TELEPHONE (OUTPATIENT)
Age: 40
End: 2025-04-18

## 2025-04-18 NOTE — TELEPHONE ENCOUNTER
2 pt identifiers used. Pt called to cancel appt w/ NP Yair as she has the flu. Pt will call back to reschedule appt when better.

## 2025-04-30 DIAGNOSIS — S39.012A BACK STRAIN, INITIAL ENCOUNTER: ICD-10-CM

## 2025-04-30 NOTE — TELEPHONE ENCOUNTER
PCP: Michela Sin, APRN - NP    Last appt: 12/19/2024   Future Appointments   Date Time Provider Department Center   5/5/2025  9:20 AM Margot Pike, JANIE [facility] BS AMB   5/8/2025 10:20 AM Margot Pike, PT [facility] BS AMB   5/12/2025  9:20 AM Margot Pike, PT [facility] BS AMB   5/16/2025  8:40 AM Margot Pike, PT [facility] BS AMB       Requested Prescriptions     Pending Prescriptions Disp Refills    diclofenac sodium (VOLTAREN) 1 % GEL [Pharmacy Med Name: DICLOFENAC 1% GEL 100GM (OTC)] 400 g 2     Sig: APPLY 4 GRAMS TOPICALLY TO THE AFFECTED AREA FOUR TIMES DAILY

## 2025-05-13 SDOH — ECONOMIC STABILITY: FOOD INSECURITY: WITHIN THE PAST 12 MONTHS, YOU WORRIED THAT YOUR FOOD WOULD RUN OUT BEFORE YOU GOT MONEY TO BUY MORE.: NEVER TRUE

## 2025-05-13 SDOH — ECONOMIC STABILITY: FOOD INSECURITY: WITHIN THE PAST 12 MONTHS, THE FOOD YOU BOUGHT JUST DIDN'T LAST AND YOU DIDN'T HAVE MONEY TO GET MORE.: NEVER TRUE

## 2025-05-13 SDOH — ECONOMIC STABILITY: INCOME INSECURITY: IN THE LAST 12 MONTHS, WAS THERE A TIME WHEN YOU WERE NOT ABLE TO PAY THE MORTGAGE OR RENT ON TIME?: NO

## 2025-05-13 SDOH — ECONOMIC STABILITY: TRANSPORTATION INSECURITY
IN THE PAST 12 MONTHS, HAS THE LACK OF TRANSPORTATION KEPT YOU FROM MEDICAL APPOINTMENTS OR FROM GETTING MEDICATIONS?: NO

## 2025-05-14 ENCOUNTER — OFFICE VISIT (OUTPATIENT)
Age: 40
End: 2025-05-14
Payer: MEDICAID

## 2025-05-14 VITALS
BODY MASS INDEX: 38.51 KG/M2 | SYSTOLIC BLOOD PRESSURE: 138 MMHG | DIASTOLIC BLOOD PRESSURE: 81 MMHG | HEIGHT: 64 IN | TEMPERATURE: 97.5 F | RESPIRATION RATE: 15 BRPM | HEART RATE: 87 BPM | OXYGEN SATURATION: 99 % | WEIGHT: 225.6 LBS

## 2025-05-14 DIAGNOSIS — G89.29 CHRONIC PAIN OF RIGHT KNEE: ICD-10-CM

## 2025-05-14 DIAGNOSIS — M25.561 CHRONIC PAIN OF RIGHT KNEE: ICD-10-CM

## 2025-05-14 DIAGNOSIS — F43.12 CHRONIC POST-TRAUMATIC STRESS DISORDER (PTSD): Primary | ICD-10-CM

## 2025-05-14 DIAGNOSIS — F41.0 PANIC ATTACKS: ICD-10-CM

## 2025-05-14 DIAGNOSIS — F33.0 MILD EPISODE OF RECURRENT MAJOR DEPRESSIVE DISORDER: ICD-10-CM

## 2025-05-14 DIAGNOSIS — Z02.89 ENCOUNTER FOR COMPLETION OF FORM WITH PATIENT: ICD-10-CM

## 2025-05-14 PROCEDURE — 99214 OFFICE O/P EST MOD 30 MIN: CPT | Performed by: NURSE PRACTITIONER

## 2025-05-14 PROCEDURE — 3075F SYST BP GE 130 - 139MM HG: CPT | Performed by: NURSE PRACTITIONER

## 2025-05-14 PROCEDURE — 3079F DIAST BP 80-89 MM HG: CPT | Performed by: NURSE PRACTITIONER

## 2025-05-14 NOTE — PROGRESS NOTES
Chief Complaint   Patient presents with    Follow-up     On disability paperwork          \"Have you been to the ER, urgent care clinic since your last visit?  Hospitalized since your last visit?\"    NO    “Have you seen or consulted any other health care providers outside of  since your last visit?”    NO            Click Here for Release of Records Request           3/17/2025    10:54 AM   PHQ-9    Little interest or pleasure in doing things 0   Feeling down, depressed, or hopeless 0   PHQ-2 Score 0   PHQ-9 Total Score 0           Financial Resource Strain: Low Risk  (12/19/2024)    Overall Financial Resource Strain (CARDIA)     Difficulty of Paying Living Expenses: Not hard at all      Food Insecurity: No Food Insecurity (5/13/2025)    Hunger Vital Sign     Worried About Running Out of Food in the Last Year: Never true     Ran Out of Food in the Last Year: Never true          Health Maintenance Due   Topic Date Due    COVID-19 Vaccine (3 - 2024-25 season) 09/01/2024    Lipids  05/14/2025

## 2025-05-15 RX ORDER — SERTRALINE HYDROCHLORIDE 100 MG/1
200 TABLET, FILM COATED ORAL DAILY
Qty: 180 TABLET | Refills: 1 | Status: SHIPPED | OUTPATIENT
Start: 2025-05-15 | End: 2025-08-13

## 2025-05-15 NOTE — ASSESSMENT & PLAN NOTE
-Plan per above.    - Reports panic attacks and social anxiety  - Limited ability to engage in relationships and handle stress  - Recommend psychiatric support and documentation for disability claim  - Provide list of potential psychiatrists  - Also recommended supportive counseling.  She has worked with our LCSW Liv in the past.

## 2025-05-15 NOTE — ASSESSMENT & PLAN NOTE
- Recommended establishing care with psychiatry for ongoing management.  Input may be needed for her disability paperwork.  - Continue sertraline 200 mg daily  Orders:    sertraline (ZOLOFT) 100 MG tablet; Take 2 tablets by mouth daily

## 2025-05-15 NOTE — ASSESSMENT & PLAN NOTE
- Stable. No dose adjustment at this time  Orders:    sertraline (ZOLOFT) 100 MG tablet; Take 2 tablets by mouth daily

## 2025-05-15 NOTE — PROGRESS NOTES
[facility]  Clinic Note     Citlali Yip (: 1985) is a 39 y.o. female, established patient, here for evaluation of the following chief complaint(s):  Follow-up (On disability paperwork )       ASSESSMENT/PLAN:    Assessment & Plan  Chronic post-traumatic stress disorder (PTSD)   - Recommended establishing care with psychiatry for ongoing management.  Input may be needed for her disability paperwork.  - Continue sertraline 200 mg daily  Orders:    sertraline (ZOLOFT) 100 MG tablet; Take 2 tablets by mouth daily    Panic attacks   -Plan per above.    - Reports panic attacks and social anxiety  - Limited ability to engage in relationships and handle stress  - Recommend psychiatric support and documentation for disability claim  - Provide list of potential psychiatrists  - Also recommended supportive counseling.  She has worked with our LCSW Liv in the past.       Chronic pain of right knee   -Working with physical therapy  - See has also seen Dr. Alka Kaba       Encounter for completion of form with patient   -We discussed need for functional capacity testing.  This has been previously recommended but cost prohibitive.  Discussed that this will be needed to support ongoing disability.       Mild episode of recurrent major depressive disorder   - Stable. No dose adjustment at this time  Orders:    sertraline (ZOLOFT) 100 MG tablet; Take 2 tablets by mouth daily      Return in about 8 weeks (around 2025) for diabetes, HTN, HLD follow up .  On this date 2025 I have spent 30 minutes reviewing previous notes, test results and face to face with the patient discussing the diagnosis and importance of compliance with the treatment plan as well as documenting on the day of the visit.          SUBJECTIVE/OBJECTIVE:  History of Present Illness  Citlali Yip  is a 39-year-old female here to fill out disability paperwork.    She reports that she can't bend, twist, lift, or carry

## 2025-05-16 DIAGNOSIS — F41.1 GAD (GENERALIZED ANXIETY DISORDER): ICD-10-CM

## 2025-05-16 RX ORDER — HYDROXYZINE HYDROCHLORIDE 50 MG/1
50 TABLET, FILM COATED ORAL DAILY PRN
Qty: 30 TABLET | Refills: 1 | Status: SHIPPED | OUTPATIENT
Start: 2025-05-16 | End: 2025-06-15

## 2025-05-16 NOTE — TELEPHONE ENCOUNTER
PCP: Michela Sin APRN - NP    Last appt: 5/14/2025     Future Appointments   Date Time Provider Department Center   7/3/2025  8:00 AM Michela Sin APRN - NP [facility] DEP       Requested Prescriptions     Pending Prescriptions Disp Refills    hydrOXYzine HCl (ATARAX) 50 MG tablet [Pharmacy Med Name: HYDROXYZINE HCL 50MG TABS (WHITE)] 30 tablet 1     Sig: TAKE 1 TABLET BY MOUTH DAILY AS NEEDED FOR ANXIETY       Prior labs and Blood pressures:  BP Readings from Last 3 Encounters:   05/14/25 138/81   03/17/25 138/88   02/07/25 122/78     Lab Results   Component Value Date/Time     12/19/2024 08:35 AM    K 3.8 12/19/2024 08:35 AM     12/19/2024 08:35 AM    CO2 25 12/19/2024 08:35 AM    BUN 10 12/19/2024 08:35 AM    GFRAA 133 09/11/2020 12:00 AM     No results found for: \"HBA1C\", \"IRU2QVCM\"  Lab Results   Component Value Date/Time    CHOL 150 05/14/2024 08:50 AM    HDL 67 05/14/2024 08:50 AM    LDL 69.6 05/14/2024 08:50 AM    LDL 96 05/17/2023 12:00 AM    VLDL 13.4 05/14/2024 08:50 AM    VLDL 17 12/28/2022 12:00 AM     No results found for: \"VITD3\"    Lab Results   Component Value Date/Time    TSH 1.43 12/19/2024 08:35 AM

## 2025-05-22 ENCOUNTER — PATIENT MESSAGE (OUTPATIENT)
Age: 40
End: 2025-05-22

## 2025-05-27 ENCOUNTER — PATIENT MESSAGE (OUTPATIENT)
Age: 40
End: 2025-05-27

## 2025-05-27 DIAGNOSIS — E66.01 SEVERE OBESITY (BMI 35.0-39.9) WITH COMORBIDITY (HCC): Primary | ICD-10-CM

## 2025-05-27 RX ORDER — AMLODIPINE BESYLATE 10 MG/1
10 TABLET ORAL DAILY
Qty: 90 TABLET | Refills: 0 | Status: SHIPPED | OUTPATIENT
Start: 2025-05-27

## 2025-05-27 NOTE — TELEPHONE ENCOUNTER
PCP: Michela Sin APRN - NP    Last appt: 5/14/2025     Future Appointments   Date Time Provider Department Center   6/2/2025 10:00 AM Margot Pike, JANIE [facility] BS AMB   6/5/2025 11:00 AM Margot Pike, JANIE [facility] BS AMB   7/3/2025  8:00 AM [facility]N - [facility]       Requested Prescriptions     Pending Prescriptions Disp Refills    amLODIPine (NORVASC) 10 MG tablet [Pharmacy Med Name: AMLODIPINE BESYLATE 10MGTABLETS] 90 tablet 0     Sig: TAKE 1 TABLET BY MOUTH DAILY       Prior labs and Blood pressures:  BP Readings from Last 3 Encounters:   05/14/25 138/81   03/17/25 138/88   02/07/25 122/78     Lab Results   Component Value Date/Time     12/19/2024 08:35 AM    K 3.8 12/19/2024 08:35 AM     12/19/2024 08:35 AM    CO2 25 12/19/2024 08:35 AM    BUN 10 12/19/2024 08:35 AM    GFRAA 133 09/11/2020 12:00 AM     No results found for: \"HBA1C\", \"[id number]\"  Lab Results   Component Value Date/Time    CHOL 150 05/14/2024 08:50 AM    HDL 67 05/14/2024 08:50 AM    LDL 69.6 05/14/2024 08:50 AM    LDL 96 05/17/2023 12:00 AM    VLDL 13.4 05/14/2024 08:50 AM    VLDL 17 12/28/2022 12:00 AM     No results found for: \"VITD3\"    Lab Results   Component Value Date/Time    TSH 1.43 12/19/2024 08:35 AM

## 2025-05-29 RX ORDER — LIRAGLUTIDE 6 MG/ML
3 INJECTION, SOLUTION SUBCUTANEOUS DAILY
Qty: 15 ADJUSTABLE DOSE PRE-FILLED PEN SYRINGE | Refills: 1 | Status: SHIPPED | OUTPATIENT
Start: 2025-05-29

## 2025-05-29 RX ORDER — LIRAGLUTIDE 6 MG/ML
3 INJECTION, SOLUTION SUBCUTANEOUS DAILY
COMMUNITY
Start: 2025-05-21 | End: 2025-05-29 | Stop reason: SDUPTHER

## 2025-06-05 ENCOUNTER — TELEPHONE (OUTPATIENT)
Age: 40
End: 2025-06-05

## 2025-06-05 NOTE — TELEPHONE ENCOUNTER
Warm transfer completed by PSVIRGINIA Castillo as pt returned call.     Informed pt that appeal request for saxenda prescribed 5/29/25 has been faxed to [facility], will follow up w/ resolve. Pt thanked for the update, stated that she picked up the remaining partial refill of Saxenda recently.

## 2025-06-05 NOTE — TELEPHONE ENCOUNTER
Faxed appeal request to [facility] for [facility]. Received receipts showing fax was sent successfully. Will follow up.    Called and LVM to pt to inform of [facility] appeal update. Sent [employer] message to pt

## 2025-06-06 DIAGNOSIS — E78.2 MIXED HYPERLIPIDEMIA: ICD-10-CM

## 2025-06-06 RX ORDER — ACETAMINOPHEN 160 MG
2000 TABLET,DISINTEGRATING ORAL DAILY
Qty: 90 CAPSULE | Refills: 1 | Status: SHIPPED | OUTPATIENT
Start: 2025-06-06

## 2025-06-06 RX ORDER — ATORVASTATIN CALCIUM 10 MG/1
10 TABLET, FILM COATED ORAL DAILY
Qty: 90 TABLET | Refills: 0 | Status: SHIPPED | OUTPATIENT
Start: 2025-06-06

## 2025-06-06 NOTE — TELEPHONE ENCOUNTER
PCP: Michela Sin APRN - NP    Last appt: 5/14/2025     Future Appointments   Date Time Provider Department Center   7/3/2025  8:00 AM [facility]       Requested Prescriptions     Pending Prescriptions Disp Refills    atorvastatin (LIPITOR) 10 MG tablet [Pharmacy Med Name: ATORVASTATIN 10MG TABLETS] 90 tablet 1     Sig: TAKE 1 TABLET BY MOUTH DAILY       Prior labs and Blood pressures:  BP Readings from Last 3 Encounters:   05/14/25 138/81   03/17/25 138/88   02/07/25 122/78     Lab Results   Component Value Date/Time     12/19/2024 08:35 AM    K 3.8 12/19/2024 08:35 AM     12/19/2024 08:35 AM    CO2 25 12/19/2024 08:35 AM    BUN 10 12/19/2024 08:35 AM    GFRAA 133 09/11/2020 12:00 AM     No results found for: \"HBA1C\", \"[id number]\"  Lab Results   Component Value Date/Time    CHOL 150 05/14/2024 08:50 AM    HDL 67 05/14/2024 08:50 AM    LDL 69.6 05/14/2024 08:50 AM    LDL 96 05/17/2023 12:00 AM    VLDL 13.4 05/14/2024 08:50 AM    VLDL 17 12/28/2022 12:00 AM     No results found for: \"VITD3\"    Lab Results   Component Value Date/Time    TSH 1.43 12/19/2024 08:35 AM

## 2025-06-06 NOTE — TELEPHONE ENCOUNTER
PCP: Michela Sin APRN - NP    Last appt: 5/14/2025     Future Appointments   Date Time Provider Department Center   7/3/2025  8:00 AM Michela Sin APRN - NP [facility] DEP       Requested Prescriptions     Pending Prescriptions Disp Refills    VITAMIN D3 50 MCG (2000 UT) CAPS capsule [Pharmacy Med Name: VITAMIN D3 2,000UNIT CAPSULES] 90 capsule 1     Sig: TAKE 1 CAPSULE BY MOUTH DAILY       Prior labs and Blood pressures:  BP Readings from Last 3 Encounters:   05/14/25 138/81   03/17/25 138/88   02/07/25 122/78     Lab Results   Component Value Date/Time     12/19/2024 08:35 AM    K 3.8 12/19/2024 08:35 AM     12/19/2024 08:35 AM    CO2 25 12/19/2024 08:35 AM    BUN 10 12/19/2024 08:35 AM    GFRAA 133 09/11/2020 12:00 AM     No results found for: \"HBA1C\", \"[id number]\"  Lab Results   Component Value Date/Time    CHOL 150 05/14/2024 08:50 AM    HDL 67 05/14/2024 08:50 AM    LDL 69.6 05/14/2024 08:50 AM    LDL 96 05/17/2023 12:00 AM    VLDL 13.4 05/14/2024 08:50 AM    VLDL 17 12/28/2022 12:00 AM     No results found for: \"VITD3\"    Lab Results   Component Value Date/Time    TSH 1.43 12/19/2024 08:35 AM

## 2025-06-09 ENCOUNTER — TELEPHONE (OUTPATIENT)
Age: 40
End: 2025-06-09

## 2025-06-09 NOTE — TELEPHONE ENCOUNTER
2 pt identifiers used. Informed pt that Saxenda 18 mg appeal has been approved, may contact pharmacy to resubmit the prescription for coverage. Pt thanked for the update.

## 2025-06-26 ENCOUNTER — PATIENT MESSAGE (OUTPATIENT)
Age: 40
End: 2025-06-26

## 2025-06-26 DIAGNOSIS — E66.01 MORBID OBESITY (HCC): Primary | ICD-10-CM

## 2025-06-26 DIAGNOSIS — E78.2 MIXED HYPERLIPIDEMIA: ICD-10-CM

## 2025-06-26 DIAGNOSIS — R73.01 IMPAIRED FASTING GLUCOSE: ICD-10-CM

## 2025-06-26 DIAGNOSIS — I10 PRIMARY HYPERTENSION: ICD-10-CM

## 2025-06-30 ENCOUNTER — LAB (OUTPATIENT)
Age: 40
End: 2025-06-30

## 2025-06-30 DIAGNOSIS — R73.01 IMPAIRED FASTING GLUCOSE: ICD-10-CM

## 2025-06-30 DIAGNOSIS — E78.2 MIXED HYPERLIPIDEMIA: ICD-10-CM

## 2025-06-30 DIAGNOSIS — I10 PRIMARY HYPERTENSION: ICD-10-CM

## 2025-06-30 LAB
ALBUMIN SERPL-MCNC: 3.4 G/DL (ref 3.5–5)
ALBUMIN/GLOB SERPL: 0.8 (ref 1.1–2.2)
ALP SERPL-CCNC: 78 U/L (ref 45–117)
ALT SERPL-CCNC: 52 U/L (ref 12–78)
ANION GAP SERPL CALC-SCNC: 4 MMOL/L (ref 2–12)
AST SERPL-CCNC: 37 U/L (ref 15–37)
BILIRUB SERPL-MCNC: 0.4 MG/DL (ref 0.2–1)
BUN SERPL-MCNC: 11 MG/DL (ref 6–20)
BUN/CREAT SERPL: 16 (ref 12–20)
CALCIUM SERPL-MCNC: 8.6 MG/DL (ref 8.5–10.1)
CHLORIDE SERPL-SCNC: 112 MMOL/L (ref 97–108)
CHOLEST SERPL-MCNC: 167 MG/DL
CO2 SERPL-SCNC: 25 MMOL/L (ref 21–32)
CREAT SERPL-MCNC: 0.68 MG/DL (ref 0.55–1.02)
EST. AVERAGE GLUCOSE BLD GHB EST-MCNC: 85 MG/DL
GLOBULIN SER CALC-MCNC: 4.3 G/DL (ref 2–4)
GLUCOSE SERPL-MCNC: 78 MG/DL (ref 65–100)
HBA1C MFR BLD: 4.6 % (ref 4–5.6)
HDLC SERPL-MCNC: 84 MG/DL
HDLC SERPL: 2 (ref 0–5)
LDLC SERPL CALC-MCNC: 60.2 MG/DL (ref 0–100)
POTASSIUM SERPL-SCNC: 3.8 MMOL/L (ref 3.5–5.1)
PROT SERPL-MCNC: 7.7 G/DL (ref 6.4–8.2)
SODIUM SERPL-SCNC: 141 MMOL/L (ref 136–145)
TRIGL SERPL-MCNC: 114 MG/DL
VLDLC SERPL CALC-MCNC: 22.8 MG/DL

## 2025-07-03 ENCOUNTER — OFFICE VISIT (OUTPATIENT)
Age: 40
End: 2025-07-03
Payer: MEDICAID

## 2025-07-03 VITALS
SYSTOLIC BLOOD PRESSURE: 107 MMHG | WEIGHT: 223.4 LBS | OXYGEN SATURATION: 98 % | DIASTOLIC BLOOD PRESSURE: 69 MMHG | HEART RATE: 96 BPM | BODY MASS INDEX: 38.14 KG/M2 | RESPIRATION RATE: 18 BRPM | HEIGHT: 64 IN | TEMPERATURE: 97.5 F

## 2025-07-03 DIAGNOSIS — E78.2 MIXED HYPERLIPIDEMIA: ICD-10-CM

## 2025-07-03 DIAGNOSIS — I10 PRIMARY HYPERTENSION: ICD-10-CM

## 2025-07-03 DIAGNOSIS — M17.11 OSTEOARTHRITIS OF RIGHT KNEE, UNSPECIFIED OSTEOARTHRITIS TYPE: ICD-10-CM

## 2025-07-03 DIAGNOSIS — Z76.89 ENCOUNTER FOR WEIGHT MANAGEMENT: ICD-10-CM

## 2025-07-03 DIAGNOSIS — G89.29 CHRONIC PAIN OF RIGHT KNEE: ICD-10-CM

## 2025-07-03 DIAGNOSIS — R10.11 RUQ ABDOMINAL PAIN: Primary | ICD-10-CM

## 2025-07-03 DIAGNOSIS — M54.9 MID BACK PAIN ON RIGHT SIDE: ICD-10-CM

## 2025-07-03 DIAGNOSIS — M25.561 CHRONIC PAIN OF RIGHT KNEE: ICD-10-CM

## 2025-07-03 PROCEDURE — 99214 OFFICE O/P EST MOD 30 MIN: CPT | Performed by: NURSE PRACTITIONER

## 2025-07-03 PROCEDURE — 3078F DIAST BP <80 MM HG: CPT | Performed by: NURSE PRACTITIONER

## 2025-07-03 PROCEDURE — 3074F SYST BP LT 130 MM HG: CPT | Performed by: NURSE PRACTITIONER

## 2025-07-03 RX ORDER — METHOCARBAMOL 750 MG/1
750 TABLET, FILM COATED ORAL 4 TIMES DAILY
Qty: 30 TABLET | Refills: 0 | Status: SHIPPED | OUTPATIENT
Start: 2025-07-03

## 2025-07-03 RX ORDER — AMLODIPINE BESYLATE 10 MG/1
10 TABLET ORAL DAILY
Qty: 90 TABLET | Refills: 1 | Status: SHIPPED | OUTPATIENT
Start: 2025-07-03

## 2025-07-03 RX ORDER — ATORVASTATIN CALCIUM 10 MG/1
10 TABLET, FILM COATED ORAL DAILY
Qty: 90 TABLET | Refills: 1 | Status: SHIPPED | OUTPATIENT
Start: 2025-07-03

## 2025-07-03 ASSESSMENT — PATIENT HEALTH QUESTIONNAIRE - PHQ9
SUM OF ALL RESPONSES TO PHQ QUESTIONS 1-9: 0
2. FEELING DOWN, DEPRESSED OR HOPELESS: NOT AT ALL
1. LITTLE INTEREST OR PLEASURE IN DOING THINGS: NOT AT ALL
SUM OF ALL RESPONSES TO PHQ QUESTIONS 1-9: 0

## 2025-07-03 NOTE — PROGRESS NOTES
Chief Complaint   Patient presents with    Discuss Labs    Back Pain         \"Have you been to the ER, urgent care clinic since your last visit?  Hospitalized since your last visit?\"    NO    “Have you seen or consulted any other health care providers outside of [facility] since your last visit?”    NO            Click Here for Release of Records Request           7/3/2025     8:01 AM   PHQ-9    Little interest or pleasure in doing things 0   Feeling down, depressed, or hopeless 0   PHQ-2 Score 0   PHQ-9 Total Score 0           Financial Resource Strain: Low Risk  (12/19/2024)    Overall Financial Resource Strain (CARDIA)     Difficulty of Paying Living Expenses: Not hard at all      Food Insecurity: No Food Insecurity (5/13/2025)    Hunger Vital Sign     Worried About Running Out of Food in the Last Year: Never true     Ran Out of Food in the Last Year: Never true          Health Maintenance Due   Topic Date Due    COVID-19 Vaccine (3 - 2024-25 season) 09/01/2024

## 2025-07-03 NOTE — PROGRESS NOTES
[facility]  Clinic Note     Citlali Yip (: 1985) is a 39 y.o. female, established patient, here for evaluation of the following chief complaint(s):  Discuss Labs and Back Pain       ASSESSMENT/PLAN:    Assessment & Plan  RUQ abdominal pain   - New x 1 week.  gallstones or cholecystitis.  - Counseling provided as to when to seek emergent care  Orders:    US ABDOMEN LIMITED; Future    Mid back pain on right side   - New x 1 week.    - Unable to use NSAIDs as she is s/p bariatric surgery   - Use heat as needed, stretching handout provided, avoid lifting bending or straining  -May use Tylenol, topical lidocaine patches or trial topical diclofenac  Orders:    methocarbamol (ROBAXIN) 750 MG tablet; Take 1 tablet by mouth 4 times daily    Osteoarthritis of right knee, unspecified osteoarthritis type   Orders:    methocarbamol (ROBAXIN) 750 MG tablet; Take 1 tablet by mouth 4 times daily    Chronic pain of right knee   Orders:    methocarbamol (ROBAXIN) 750 MG tablet; Take 1 tablet by mouth 4 times daily    Mixed hyperlipidemia   - Reviewed and discussed labs  Lab Results   Component Value Date    CHOL 167 2025    TRIG 114 2025    HDL 84 2025    LDL 60.2 2025    VLDL 22.8 2025    CHOLHDLRATIO 2.0 2025     Orders:    atorvastatin (LIPITOR) 10 MG tablet; Take 1 tablet by mouth daily    Primary hypertension   - Advised home monitoring 1-2x/week  - Continue current regimen; no changes based on labs  Orders:    amLODIPine (NORVASC) 10 MG tablet; Take 1 tablet by mouth daily    Encounter for weight management   - Curbing appetite without significant weight loss  - Advised to continue and monitor progress. Followed by bariatric clinic   3/17/2025 2025 7/3/2025   Weight Loss Metrics      Height 5' 4\"  5' 4\"  5' 4\"    Weight - Scale 230 lbs  225 lbs 10 oz  223 lbs 6 oz    BMI (Calculated) 39.6 kg/m2  38.8 kg/m2  38.4 kg/m2           Return if symptoms worsen

## 2025-07-03 NOTE — ASSESSMENT & PLAN NOTE
- Advised home monitoring 1-2x/week  - Continue current regimen; no changes based on labs  Orders:    amLODIPine (NORVASC) 10 MG tablet; Take 1 tablet by mouth daily

## 2025-07-03 NOTE — ASSESSMENT & PLAN NOTE
- Reviewed and discussed labs  Lab Results   Component Value Date    CHOL 167 06/30/2025    TRIG 114 06/30/2025    HDL 84 06/30/2025    LDL 60.2 06/30/2025    VLDL 22.8 06/30/2025    CHOLHDLRATIO 2.0 06/30/2025     Orders:    atorvastatin (LIPITOR) 10 MG tablet; Take 1 tablet by mouth daily

## 2025-07-07 ENCOUNTER — RESULTS FOLLOW-UP (OUTPATIENT)
Age: 40
End: 2025-07-07

## 2025-07-16 ENCOUNTER — HOSPITAL ENCOUNTER (OUTPATIENT)
Facility: HOSPITAL | Age: 40
Discharge: HOME | End: 2025-07-19
Payer: MEDICAID

## 2025-07-16 DIAGNOSIS — R10.11 RUQ ABDOMINAL PAIN: ICD-10-CM

## 2025-07-16 PROCEDURE — 76705 ECHO EXAM OF ABDOMEN: CPT

## 2025-08-13 DIAGNOSIS — E66.01 SEVERE OBESITY (BMI 35.0-39.9) WITH COMORBIDITY (HCC): ICD-10-CM

## 2025-08-13 DIAGNOSIS — M54.9 MID BACK PAIN ON RIGHT SIDE: ICD-10-CM

## 2025-08-13 DIAGNOSIS — M25.561 CHRONIC PAIN OF RIGHT KNEE: ICD-10-CM

## 2025-08-13 DIAGNOSIS — S39.012A BACK STRAIN, INITIAL ENCOUNTER: ICD-10-CM

## 2025-08-13 DIAGNOSIS — G89.29 CHRONIC PAIN OF RIGHT KNEE: ICD-10-CM

## 2025-08-13 DIAGNOSIS — M17.11 OSTEOARTHRITIS OF RIGHT KNEE, UNSPECIFIED OSTEOARTHRITIS TYPE: ICD-10-CM

## 2025-08-13 DIAGNOSIS — T73.0XXD HUNGRY, SUBSEQUENT ENCOUNTER: ICD-10-CM

## 2025-08-13 RX ORDER — METHOCARBAMOL 750 MG/1
750 TABLET, FILM COATED ORAL 4 TIMES DAILY
Qty: 30 TABLET | Refills: 0 | Status: SHIPPED | OUTPATIENT
Start: 2025-08-13

## 2025-08-14 RX ORDER — TOPIRAMATE 50 MG/1
25 TABLET, FILM COATED ORAL 2 TIMES DAILY
Qty: 90 TABLET | Refills: 1 | Status: SHIPPED | OUTPATIENT
Start: 2025-08-14

## 2025-08-15 DIAGNOSIS — S39.012A BACK STRAIN, INITIAL ENCOUNTER: ICD-10-CM

## (undated) DEVICE — PACK,BASIC,SIRUS,V: Brand: MEDLINE

## (undated) DEVICE — CANNULA SEAL

## (undated) DEVICE — ENDO CARRY-ON PROCEDURE KIT INCLUDES ENZYMATIC SPONGE, GAUZE, BIOHAZARD LABEL, TRAY, LUBRICANT, DIRTY SCOPE LABEL, WATER LABEL, TRAY, DRAWSTRING PAD, AND DEFENDO 4-PIECE KIT.: Brand: ENDO CARRY-ON PROCEDURE KIT

## (undated) DEVICE — STAPLER 60 RELOAD BLUE: Brand: SUREFORM

## (undated) DEVICE — SOLUTION IV 1000ML 0.9% SOD CHL PH 5 INJ USP VIAFLX PLAS

## (undated) DEVICE — SUTURE ETHBND EXCEL SZ 2-0 L36IN NONABSORBABLE GRN L26MM SH X523H

## (undated) DEVICE — GARMENT,MEDLINE,DVT,INT,CALF,MED, GEN2: Brand: MEDLINE

## (undated) DEVICE — EVACUATOR SURG 100CC SIL BLB SUCT RESVR FOR CLS WND DRNGE

## (undated) DEVICE — SUTURE MCRYL + SZ 4-0 L27IN ABSRB UD L19MM PS-2 3/8 CIR MCP426H

## (undated) DEVICE — TIP COVER ACCESSORY

## (undated) DEVICE — VESSEL SEALER EXTEND: Brand: ENDOWRIST

## (undated) DEVICE — SEAL

## (undated) DEVICE — SUTURE V-LOC 90 SZ 3-0 L6IN ABSRB VLT V-20 L26MM 1/2 CIR VLOCM0604

## (undated) DEVICE — SUTURE NONABSORBABLE MONOFILAMENT 2-0 V-20 6 IN V-LOC PBT VLOCN0605

## (undated) DEVICE — STAPLER 60 RELOAD WHITE: Brand: SUREFORM

## (undated) DEVICE — SUTURE PDS II SZ 3-0 L27IN ABSRB VLT L26MM SH 1/2 CIR Z316H

## (undated) DEVICE — STAPLER SKIN LN REINF 60 MM ECHELON ENDOPATH

## (undated) DEVICE — BLADELESS OBTURATOR: Brand: WECK VISTA

## (undated) DEVICE — SUTURE SZ 0 27IN 5/8 CIR UR-6  TAPER PT VIOLET ABSRB VICRYL J603H

## (undated) DEVICE — SOLUTION ANTIFOG VIS SYS CLEARIFY LAPSCP

## (undated) DEVICE — GENERAL LAPAROSCOPY - SMH: Brand: MEDLINE INDUSTRIES, INC.

## (undated) DEVICE — SHEET TRNSF DISPOSABLE HOVERMATT 100 PER CA

## (undated) DEVICE — ARM DRAPE

## (undated) DEVICE — Device

## (undated) DEVICE — LIQUIBAND RAPID ADHESIVE 36/CS 0.8ML: Brand: MEDLINE

## (undated) DEVICE — VISIGI 3D®  CALIBRATION SYSTEM  SIZE 40FR STD W/ BULB: Brand: BOEHRINGER® VISIGI 3D™ SLEEVE GASTRECTOMY CALIBRATION SYSTEM, SIZE 40FR W/BULB

## (undated) DEVICE — SUTURE N ABSRB MONOFILAMENT 0 GS-22 6 IN BLU V-LOC PBT VLOCN2106

## (undated) DEVICE — DRAIN SURG 19FR 100% SIL END PERF RND NO RESVR TB W/ TRCR

## (undated) DEVICE — SYSTEM EVAC SMOKE LAPARSCOPIC

## (undated) DEVICE — STAPLER 60: Brand: SUREFORM

## (undated) DEVICE — AIR SHEET,LAT,COMFORT GLIDE, BLEND 40X80: Brand: MEDLINE

## (undated) DEVICE — ELECTRO LUBE IS A SINGLE PATIENT USE DEVICE THAT IS INTENDED TO BE USED ON ELECTROSURGICAL ELECTRODES TO REDUCE STICKING.: Brand: KEY SURGICAL ELECTRO LUBE

## (undated) DEVICE — DEVICE SUT FOR TRCR SITE INCIS ENDO CLOSE

## (undated) DEVICE — TROCAR ENDOSCP L100MM DIA5MM BLDELSS STBL SL THRD OPT VW